# Patient Record
Sex: FEMALE | Race: WHITE | NOT HISPANIC OR LATINO
[De-identification: names, ages, dates, MRNs, and addresses within clinical notes are randomized per-mention and may not be internally consistent; named-entity substitution may affect disease eponyms.]

---

## 2017-07-30 ENCOUNTER — RESULT REVIEW (OUTPATIENT)
Age: 37
End: 2017-07-30

## 2018-01-31 ENCOUNTER — TRANSCRIPTION ENCOUNTER (OUTPATIENT)
Age: 38
End: 2018-01-31

## 2018-08-02 ENCOUNTER — RESULT REVIEW (OUTPATIENT)
Age: 38
End: 2018-08-02

## 2019-03-15 ENCOUNTER — TRANSCRIPTION ENCOUNTER (OUTPATIENT)
Age: 39
End: 2019-03-15

## 2019-07-31 ENCOUNTER — APPOINTMENT (OUTPATIENT)
Dept: ORTHOPEDIC SURGERY | Facility: CLINIC | Age: 39
End: 2019-07-31
Payer: COMMERCIAL

## 2019-07-31 VITALS — SYSTOLIC BLOOD PRESSURE: 152 MMHG | DIASTOLIC BLOOD PRESSURE: 95 MMHG | HEART RATE: 68 BPM

## 2019-07-31 VITALS — BODY MASS INDEX: 22.08 KG/M2 | WEIGHT: 120 LBS | HEIGHT: 62 IN

## 2019-07-31 DIAGNOSIS — Z78.9 OTHER SPECIFIED HEALTH STATUS: ICD-10-CM

## 2019-07-31 DIAGNOSIS — M25.521 PAIN IN RIGHT ELBOW: ICD-10-CM

## 2019-07-31 PROCEDURE — 73080 X-RAY EXAM OF ELBOW: CPT | Mod: RT

## 2019-07-31 PROCEDURE — 99204 OFFICE O/P NEW MOD 45 MIN: CPT

## 2019-07-31 NOTE — DISCUSSION/SUMMARY
[de-identified] : Lateral epicondylitis\par Rec tennis elbow brace\par Cryotherapy, Aleve, and rest were discussed\par She will followup with Dr. Aleman, in 3 -4 weeks if no better\par Options discussed.\par All questions answered.\par The patient is in full agreement with the plan, \par and happy with the visit.\par --------------\par This note was done with a voice recognition transcription \par software and any typos are related to this rather than medical error.\par \par

## 2019-07-31 NOTE — HISTORY OF PRESENT ILLNESS
[de-identified] : Patient is a pleasant, 39-year-old, right-hand-dominant female with a history of thyroid cancer comes in today complaining of right elbow pain for 2 months. Patient states that she is in karate for the past 5 years and is training for a tough mudder race.  Denies any specific injury or radicular symptoms.\par Patient admits to rubbing peppermint t oil on her elbow, which has helped somewhat.\par Patient points to her lateral aspect of her right elbow is #1 source of pain today.\par No fever chills sweats nausea vomiting no bowel or bladder dysfunction\par no recent weight loss or gain no night pain. \par This history is in addition to the intake form that I personally reviewed.

## 2019-08-02 PROBLEM — M25.521 RIGHT ELBOW PAIN: Status: ACTIVE | Noted: 2019-08-02

## 2019-08-13 ENCOUNTER — RESULT REVIEW (OUTPATIENT)
Age: 39
End: 2019-08-13

## 2019-08-22 ENCOUNTER — APPOINTMENT (OUTPATIENT)
Dept: ORTHOPEDIC SURGERY | Facility: CLINIC | Age: 39
End: 2019-08-22

## 2020-04-02 ENCOUNTER — RESULT REVIEW (OUTPATIENT)
Age: 40
End: 2020-04-02

## 2020-04-13 ENCOUNTER — OUTPATIENT (OUTPATIENT)
Dept: OUTPATIENT SERVICES | Facility: HOSPITAL | Age: 40
LOS: 1 days | End: 2020-04-13
Payer: COMMERCIAL

## 2020-04-13 ENCOUNTER — RESULT REVIEW (OUTPATIENT)
Age: 40
End: 2020-04-13

## 2020-04-13 ENCOUNTER — APPOINTMENT (OUTPATIENT)
Dept: ULTRASOUND IMAGING | Facility: CLINIC | Age: 40
End: 2020-04-13
Payer: COMMERCIAL

## 2020-04-13 DIAGNOSIS — Z00.8 ENCOUNTER FOR OTHER GENERAL EXAMINATION: ICD-10-CM

## 2020-04-13 PROCEDURE — 76830 TRANSVAGINAL US NON-OB: CPT

## 2020-04-13 PROCEDURE — 76830 TRANSVAGINAL US NON-OB: CPT | Mod: 26

## 2020-04-13 PROCEDURE — 76856 US EXAM PELVIC COMPLETE: CPT | Mod: 26

## 2020-04-13 PROCEDURE — 76856 US EXAM PELVIC COMPLETE: CPT

## 2020-05-04 ENCOUNTER — APPOINTMENT (OUTPATIENT)
Dept: ULTRASOUND IMAGING | Facility: CLINIC | Age: 40
End: 2020-05-04
Payer: COMMERCIAL

## 2020-05-04 ENCOUNTER — RESULT REVIEW (OUTPATIENT)
Age: 40
End: 2020-05-04

## 2020-05-04 ENCOUNTER — OUTPATIENT (OUTPATIENT)
Dept: OUTPATIENT SERVICES | Facility: HOSPITAL | Age: 40
LOS: 1 days | End: 2020-05-04
Payer: COMMERCIAL

## 2020-05-04 DIAGNOSIS — Z00.8 ENCOUNTER FOR OTHER GENERAL EXAMINATION: ICD-10-CM

## 2020-05-04 PROCEDURE — 58340 CATHETER FOR HYSTEROGRAPHY: CPT

## 2020-05-04 PROCEDURE — 76831 ECHO EXAM UTERUS: CPT | Mod: 26

## 2020-05-04 PROCEDURE — 76831 ECHO EXAM UTERUS: CPT

## 2020-06-22 ENCOUNTER — OUTPATIENT (OUTPATIENT)
Dept: OUTPATIENT SERVICES | Facility: HOSPITAL | Age: 40
LOS: 1 days | End: 2020-06-22
Payer: COMMERCIAL

## 2020-06-22 DIAGNOSIS — Z11.59 ENCOUNTER FOR SCREENING FOR OTHER VIRAL DISEASES: ICD-10-CM

## 2020-06-22 PROCEDURE — U0003: CPT

## 2020-06-23 LAB — SARS-COV-2 RNA SPEC QL NAA+PROBE: SIGNIFICANT CHANGE UP

## 2020-06-24 ENCOUNTER — TRANSCRIPTION ENCOUNTER (OUTPATIENT)
Age: 40
End: 2020-06-24

## 2020-06-25 ENCOUNTER — OUTPATIENT (OUTPATIENT)
Dept: OUTPATIENT SERVICES | Facility: HOSPITAL | Age: 40
LOS: 1 days | End: 2020-06-25
Payer: COMMERCIAL

## 2020-06-25 ENCOUNTER — RESULT REVIEW (OUTPATIENT)
Age: 40
End: 2020-06-25

## 2020-06-25 VITALS
WEIGHT: 128.09 LBS | OXYGEN SATURATION: 100 % | SYSTOLIC BLOOD PRESSURE: 111 MMHG | DIASTOLIC BLOOD PRESSURE: 76 MMHG | RESPIRATION RATE: 20 BRPM | HEIGHT: 63 IN | TEMPERATURE: 98 F | HEART RATE: 76 BPM

## 2020-06-25 VITALS
DIASTOLIC BLOOD PRESSURE: 80 MMHG | HEART RATE: 88 BPM | SYSTOLIC BLOOD PRESSURE: 127 MMHG | RESPIRATION RATE: 17 BRPM | TEMPERATURE: 97 F | OXYGEN SATURATION: 98 %

## 2020-06-25 DIAGNOSIS — Z01.818 ENCOUNTER FOR OTHER PREPROCEDURAL EXAMINATION: ICD-10-CM

## 2020-06-25 DIAGNOSIS — Z98.890 OTHER SPECIFIED POSTPROCEDURAL STATES: Chronic | ICD-10-CM

## 2020-06-25 DIAGNOSIS — D25.0 SUBMUCOUS LEIOMYOMA OF UTERUS: ICD-10-CM

## 2020-06-25 PROCEDURE — 58561 HYSTEROSCOPY REMOVE MYOMA: CPT

## 2020-06-25 PROCEDURE — 88305 TISSUE EXAM BY PATHOLOGIST: CPT | Mod: 26

## 2020-06-25 PROCEDURE — 88305 TISSUE EXAM BY PATHOLOGIST: CPT

## 2020-06-25 RX ORDER — ACETAMINOPHEN 500 MG
2 TABLET ORAL
Qty: 0 | Refills: 0 | DISCHARGE

## 2020-06-25 RX ORDER — CALCITRIOL 0.5 UG/1
0.5 CAPSULE ORAL DAILY
Refills: 0 | Status: DISCONTINUED | OUTPATIENT
Start: 2020-06-25 | End: 2020-07-10

## 2020-06-25 RX ORDER — LIDOCAINE HCL 20 MG/ML
0.2 VIAL (ML) INJECTION ONCE
Refills: 0 | Status: DISCONTINUED | OUTPATIENT
Start: 2020-06-25 | End: 2020-07-10

## 2020-06-25 RX ORDER — IBUPROFEN 200 MG
600 TABLET ORAL EVERY 6 HOURS
Refills: 0 | Status: DISCONTINUED | OUTPATIENT
Start: 2020-06-25 | End: 2020-07-10

## 2020-06-25 RX ORDER — ACETAMINOPHEN 500 MG
1 TABLET ORAL
Qty: 0 | Refills: 0 | DISCHARGE
Start: 2020-06-25

## 2020-06-25 RX ORDER — ONDANSETRON 8 MG/1
4 TABLET, FILM COATED ORAL ONCE
Refills: 0 | Status: DISCONTINUED | OUTPATIENT
Start: 2020-06-25 | End: 2020-07-10

## 2020-06-25 RX ORDER — FENTANYL CITRATE 50 UG/ML
25 INJECTION INTRAVENOUS
Refills: 0 | Status: DISCONTINUED | OUTPATIENT
Start: 2020-06-25 | End: 2020-06-25

## 2020-06-25 RX ORDER — SODIUM CHLORIDE 9 MG/ML
3 INJECTION INTRAMUSCULAR; INTRAVENOUS; SUBCUTANEOUS EVERY 8 HOURS
Refills: 0 | Status: DISCONTINUED | OUTPATIENT
Start: 2020-06-25 | End: 2020-07-10

## 2020-06-25 RX ORDER — NORETHINDRONE AND ETHINYL ESTRADIOL 0.4-0.035
1 KIT ORAL
Qty: 0 | Refills: 0 | DISCHARGE

## 2020-06-25 RX ORDER — SODIUM CHLORIDE 9 MG/ML
1000 INJECTION, SOLUTION INTRAVENOUS
Refills: 0 | Status: DISCONTINUED | OUTPATIENT
Start: 2020-06-25 | End: 2020-07-10

## 2020-06-25 RX ORDER — LEVOTHYROXINE SODIUM 125 MCG
175 TABLET ORAL DAILY
Refills: 0 | Status: DISCONTINUED | OUTPATIENT
Start: 2020-06-25 | End: 2020-07-10

## 2020-06-25 RX ORDER — IBUPROFEN 200 MG
1 TABLET ORAL
Qty: 0 | Refills: 0 | DISCHARGE
Start: 2020-06-25

## 2020-06-25 RX ORDER — ACETAMINOPHEN 500 MG
500 TABLET ORAL EVERY 6 HOURS
Refills: 0 | Status: DISCONTINUED | OUTPATIENT
Start: 2020-06-25 | End: 2020-07-10

## 2020-06-25 RX ORDER — IBUPROFEN 200 MG
1 TABLET ORAL
Qty: 0 | Refills: 0 | DISCHARGE

## 2020-06-25 NOTE — ASU PATIENT PROFILE, ADULT - PMH
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  Thyroid cancer  2010

## 2020-06-25 NOTE — BRIEF OPERATIVE NOTE - NSICDXBRIEFPROCEDURE_GEN_ALL_CORE_FT
PROCEDURES:  Dilation and curettage, uterus, with operative hysteroscopy and submucous leiomyoma excision 25-Jun-2020 09:32:03  Seferino Cheney

## 2020-06-25 NOTE — ASU DISCHARGE PLAN (ADULT/PEDIATRIC) - CALL YOUR DOCTOR IF YOU HAVE ANY OF THE FOLLOWING:
Wound/Surgical Site with redness, or foul smelling discharge or pus/Pain not relieved by Medications/Bleeding that does not stop/Swelling that gets worse/Fever greater than (need to indicate Fahrenheit or Celsius)

## 2020-06-25 NOTE — ASU PATIENT PROFILE, ADULT - IS PATIENT PREGNANT?
Post-Op Assessment Note      CV Status:  Stable    Mental Status:  Alert    Hydration Status:  Stable    PONV Controlled:  None    Airway Patency:  Patent  Airway: intubated    Post Op Vitals Reviewed: Yes          Staff: Anesthesiologist           BP   164/97   Temp   98F   Pulse  72   Resp      SpO2   99 no

## 2020-06-25 NOTE — ASU DISCHARGE PLAN (ADULT/PEDIATRIC) - PROCEDURE
D&C hysteroscopy, polypectomy D&C operative hysteroscopy, myomectomy using Myosure device D&C operative hysteroscopy, myomectomy using myosure device

## 2020-06-25 NOTE — BRIEF OPERATIVE NOTE - OPERATION/FINDINGS
anteverted uterus; benign appearing endometrium, ostia wnl; 4cm fundal submucosal fibroid removed using Myosure. Sharp curettage performed. Fluid defecit 100cc. Hemostasis achieved. Count correct, patient cleaned and stable.

## 2020-06-25 NOTE — ASU DISCHARGE PLAN (ADULT/PEDIATRIC) - CARE PROVIDER_API CALL
Iggy Dawn)  Obstetrics and Gynecology  [address] [place] [address]  Phone: [phone]  Fax: [phone]  Follow Up Time:

## 2020-06-25 NOTE — PRE-ANESTHESIA EVALUATION ADULT - NSANTHOSAYNRD_GEN_A_CORE
No. BENI screening performed.  STOP BANG Legend: 0-2 = LOW Risk; 3-4 = INTERMEDIATE Risk; 5-8 = HIGH Risk

## 2020-06-25 NOTE — ASU DISCHARGE PLAN (ADULT/PEDIATRIC) - ASU DC SPECIAL INSTRUCTIONSFT
Expect abdominal cramping/pain and spotting for the next weeks. Take ibuprofen and Tylenol for cramping. Use a pad as needed. Call your physician or go to the emergency room if you experience any of the following: heavy vaginal bleeding (soaking more than 2 pads in 1 hour for 2 hours), fever, chills, nausea, vomiting, or pain that is not controlled by medication. Follow-up with Dr. Dawn in 6 weeks.

## 2020-07-01 LAB — SURGICAL PATHOLOGY STUDY: SIGNIFICANT CHANGE UP

## 2020-09-15 ENCOUNTER — RESULT REVIEW (OUTPATIENT)
Age: 40
End: 2020-09-15

## 2021-01-12 ENCOUNTER — TRANSCRIPTION ENCOUNTER (OUTPATIENT)
Age: 41
End: 2021-01-12

## 2021-02-10 NOTE — PRE-ANESTHESIA EVALUATION ADULT - NSANTHAPLANRD_GEN_ALL_CORE
Chief Complaint   Patient presents with   • Office Visit   • Procedure     toe nail removal     HPI:   Right great toe  - Started 2 months ago   - Painful, draining pus  - Has tried rinsing in shower. Has not tried antibiotics or creams.   - No prior hx of ingrown nails     Physical Examination:   Visit Vitals  BP (!) 126/79 (BP Location: LUE - Left upper extremity, Patient Position: Sitting, Cuff Size: Small Adult)   Pulse 103   Resp 20   Gen: well appearing, no apparent distress   Ext: R first toenail laterally inflamed with blood and pus draining  Nail Removal    Date/Time: 2/10/2021 4:37 PM  Performed by: Val Erickson DO  Authorized by: Stanislaw Tompkins MD   Location: right foot  Location details: right big toe  Anesthesia: digital block    Anesthesia:  Local Anesthetic: lidocaine 1% without epinephrine  Anesthetic total: 10 mL    Sedation:  Patient sedated: no    Preparation: skin prepped with alcohol  Amount removed: 1/2  Side: lateral  Wedge excision of skin of nail fold: no  Nail bed sutured: no  Nail matrix removed: partial  Dressing: 4x4 and gauze roll  Patient tolerance: patient tolerated the procedure well with no immediate complications  Comments: Hemostasis achieved with pressure and silver nitrate. Patient tolerated procedure well.       A/P:   Ingrown toenail s/p removal   - Discussed signs of infection  - Patient tolerated procedure well   - Due to pus on presentation, will have return to clinic early next week for re-evaluation     Val Erickson DO  PGY1 Family Medicine Resident        
I saw and evaluated the patient. I discussed the patient's case with the resident. I agree with the Resident's findings and plan, as documented in today's note.      Risks, benefits and alternatives to procedure discussed with patient. Consent was obtained. I was present for procedure.     Galileo Tompkins MD  Family Medicine Faculty      
general

## 2021-03-04 ENCOUNTER — APPOINTMENT (OUTPATIENT)
Dept: INTERNAL MEDICINE | Facility: CLINIC | Age: 41
End: 2021-03-04

## 2021-03-18 ENCOUNTER — APPOINTMENT (OUTPATIENT)
Dept: INTERNAL MEDICINE | Facility: CLINIC | Age: 41
End: 2021-03-18
Payer: COMMERCIAL

## 2021-03-18 VITALS
HEIGHT: 62 IN | DIASTOLIC BLOOD PRESSURE: 75 MMHG | BODY MASS INDEX: 23.55 KG/M2 | OXYGEN SATURATION: 98 % | HEART RATE: 88 BPM | WEIGHT: 128 LBS | TEMPERATURE: 98.7 F | SYSTOLIC BLOOD PRESSURE: 115 MMHG

## 2021-03-18 DIAGNOSIS — Z11.3 ENCOUNTER FOR SCREENING FOR INFECTIONS WITH A PREDOMINANTLY SEXUAL MODE OF TRANSMISSION: ICD-10-CM

## 2021-03-18 DIAGNOSIS — Z83.49 FAMILY HISTORY OF OTHER ENDOCRINE, NUTRITIONAL AND METABOLIC DISEASES: ICD-10-CM

## 2021-03-18 PROCEDURE — 36415 COLL VENOUS BLD VENIPUNCTURE: CPT

## 2021-03-18 PROCEDURE — 99072 ADDL SUPL MATRL&STAF TM PHE: CPT

## 2021-03-18 PROCEDURE — 99386 PREV VISIT NEW AGE 40-64: CPT | Mod: 25

## 2021-03-18 RX ORDER — CALCITRIOL 100 %
CRYSTALS MISCELLANEOUS
Refills: 0 | Status: DISCONTINUED | COMMUNITY
End: 2021-03-18

## 2021-03-18 RX ORDER — NORETHINDRONE ACETATE AND ETHINYL ESTRADIOL 1.5; 3 MG/1; UG/1
1.5-3 TABLET ORAL
Refills: 0 | Status: DISCONTINUED | COMMUNITY
End: 2021-03-18

## 2021-03-18 RX ORDER — CALCITRIOL 0.5 UG/1
0.5 CAPSULE, LIQUID FILLED ORAL
Refills: 0 | Status: ACTIVE | COMMUNITY

## 2021-03-19 LAB
25(OH)D3 SERPL-MCNC: 32.7 NG/ML
ALBUMIN SERPL ELPH-MCNC: 4.2 G/DL
ALP BLD-CCNC: 36 U/L
ALT SERPL-CCNC: 38 U/L
ANION GAP SERPL CALC-SCNC: 12 MMOL/L
AST SERPL-CCNC: 20 U/L
BASOPHILS # BLD AUTO: 0.07 K/UL
BASOPHILS NFR BLD AUTO: 0.9 %
BILIRUB SERPL-MCNC: 0.2 MG/DL
BUN SERPL-MCNC: 15 MG/DL
CALCIUM SERPL-MCNC: 8.3 MG/DL
CHLORIDE SERPL-SCNC: 103 MMOL/L
CHOLEST SERPL-MCNC: 171 MG/DL
CO2 SERPL-SCNC: 24 MMOL/L
CREAT SERPL-MCNC: 0.86 MG/DL
EOSINOPHIL # BLD AUTO: 0.11 K/UL
EOSINOPHIL NFR BLD AUTO: 1.4 %
ESTIMATED AVERAGE GLUCOSE: 111 MG/DL
GLUCOSE SERPL-MCNC: 92 MG/DL
HAV IGM SER QL: NONREACTIVE
HBA1C MFR BLD HPLC: 5.5 %
HBV CORE IGM SER QL: NONREACTIVE
HBV SURFACE AG SER QL: NONREACTIVE
HCT VFR BLD CALC: 39.2 %
HCV AB SER QL: NONREACTIVE
HCV S/CO RATIO: 0.58 S/CO
HDLC SERPL-MCNC: 54 MG/DL
HGB BLD-MCNC: 12.4 G/DL
HIV1+2 AB SPEC QL IA.RAPID: NONREACTIVE
IMM GRANULOCYTES NFR BLD AUTO: 0.3 %
LDLC SERPL CALC-MCNC: 101 MG/DL
LYMPHOCYTES # BLD AUTO: 2.63 K/UL
LYMPHOCYTES NFR BLD AUTO: 33.7 %
MAN DIFF?: NORMAL
MCHC RBC-ENTMCNC: 27 PG
MCHC RBC-ENTMCNC: 31.6 GM/DL
MCV RBC AUTO: 85.2 FL
MONOCYTES # BLD AUTO: 0.47 K/UL
MONOCYTES NFR BLD AUTO: 6 %
NEUTROPHILS # BLD AUTO: 4.51 K/UL
NEUTROPHILS NFR BLD AUTO: 57.7 %
NONHDLC SERPL-MCNC: 117 MG/DL
PLATELET # BLD AUTO: 387 K/UL
POTASSIUM SERPL-SCNC: 4.5 MMOL/L
PROT SERPL-MCNC: 7 G/DL
RBC # BLD: 4.6 M/UL
RBC # FLD: 14.2 %
SODIUM SERPL-SCNC: 139 MMOL/L
T PALLIDUM AB SER QL IA: NEGATIVE
TRIGL SERPL-MCNC: 78 MG/DL
TSH SERPL-ACNC: 0.61 UIU/ML
WBC # FLD AUTO: 7.81 K/UL

## 2021-03-22 LAB
C TRACH RRNA SPEC QL NAA+PROBE: NOT DETECTED
N GONORRHOEA RRNA SPEC QL NAA+PROBE: NOT DETECTED
SOURCE AMPLIFICATION: NORMAL

## 2021-04-05 ENCOUNTER — RESULT REVIEW (OUTPATIENT)
Age: 41
End: 2021-04-05

## 2021-04-19 ENCOUNTER — RESULT REVIEW (OUTPATIENT)
Age: 41
End: 2021-04-19

## 2021-07-12 ENCOUNTER — APPOINTMENT (OUTPATIENT)
Dept: MAMMOGRAPHY | Facility: CLINIC | Age: 41
End: 2021-07-12
Payer: COMMERCIAL

## 2021-07-12 ENCOUNTER — APPOINTMENT (OUTPATIENT)
Dept: ULTRASOUND IMAGING | Facility: CLINIC | Age: 41
End: 2021-07-12
Payer: COMMERCIAL

## 2021-07-12 PROCEDURE — 77063 BREAST TOMOSYNTHESIS BI: CPT

## 2021-07-12 PROCEDURE — 76641 ULTRASOUND BREAST COMPLETE: CPT | Mod: 50

## 2021-07-12 PROCEDURE — 77067 SCR MAMMO BI INCL CAD: CPT

## 2021-07-26 PROBLEM — N63.0 BREAST MASS IN FEMALE: Status: ACTIVE | Noted: 2021-07-26

## 2021-08-02 ENCOUNTER — APPOINTMENT (OUTPATIENT)
Dept: SURGICAL ONCOLOGY | Facility: CLINIC | Age: 41
End: 2021-08-02
Payer: COMMERCIAL

## 2021-08-02 ENCOUNTER — RESULT REVIEW (OUTPATIENT)
Age: 41
End: 2021-08-02

## 2021-08-02 DIAGNOSIS — Z87.09 PERSONAL HISTORY OF OTHER DISEASES OF THE RESPIRATORY SYSTEM: ICD-10-CM

## 2021-08-02 DIAGNOSIS — Z80.3 FAMILY HISTORY OF MALIGNANT NEOPLASM OF BREAST: ICD-10-CM

## 2021-08-02 DIAGNOSIS — N63.0 UNSPECIFIED LUMP IN UNSPECIFIED BREAST: ICD-10-CM

## 2021-08-02 PROCEDURE — 99203 OFFICE O/P NEW LOW 30 MIN: CPT

## 2021-08-02 NOTE — HISTORY OF PRESENT ILLNESS
[de-identified] : Ms. DEREK BAKER is a 41 year old female who present today for initial consultation for right breast mass\par \par Past medical history: thyroid cancer at age 30, Asthma \par Family history: breast cancer in maternal grandmother at age 70. \par \par B/L mammo/sono 7/2021: Right breast 8:00 4cm from the nipple is a well-circumscribed benign-appearing hypoechoic nodule measuring 15 x 5 x 10mm. This finding appear increased in size possibly relating to difference in technique. Recommended for 6 \par months right breast US. BIRADS 3. \par \par  Today she is without any complaints. Denies palpable breast masses, nipple discharge, skin changes, inversion or breast pain. Denies constitutional symptoms.

## 2021-08-02 NOTE — ASSESSMENT
[FreeTextEntry1] : IMP: 41 year old female present for right breast mass \par \par PLAN: \par right breast US in 6 months. \par RTO 1 year

## 2021-08-02 NOTE — PHYSICAL EXAM
[Normal] : supple, no neck mass and thyroid not enlarged [Normal Neck Lymph Nodes] : normal neck lymph nodes  [Normal Supraclavicular Lymph Nodes] : normal supraclavicular lymph nodes [Normal Axillary Lymph Nodes] : normal axillary lymph nodes [Normal] : oriented to person, place and time, with appropriate affect [FreeTextEntry1] : GS was present during the exam  [de-identified] : Normal S1, S2. regular rate and rhythm.  [de-identified] : Complete breast exam performed in supine and upright positions. No palpable masses, tenderness, nipple discharge, inversion, deviation or enlarged axillary or supraclavicular lymph nodes bilaterally.  [de-identified] : Clear breath sounds bilaterally, normal respiratory effort.

## 2021-08-02 NOTE — CONSULT LETTER
[Dear  ___] : Dear  [unfilled], [Consult Letter:] : I had the pleasure of evaluating your patient, [unfilled]. [( Thank you for referring [unfilled] for consultation for _____ )] : Thank you for referring [unfilled] for consultation for [unfilled] [FreeTextEntry2] : Dr. Brumfield

## 2021-09-21 ENCOUNTER — NON-APPOINTMENT (OUTPATIENT)
Age: 41
End: 2021-09-21

## 2021-09-21 ENCOUNTER — TRANSCRIPTION ENCOUNTER (OUTPATIENT)
Age: 41
End: 2021-09-21

## 2021-10-04 ENCOUNTER — RESULT REVIEW (OUTPATIENT)
Age: 41
End: 2021-10-04

## 2021-10-13 ENCOUNTER — EMERGENCY (EMERGENCY)
Facility: HOSPITAL | Age: 41
LOS: 1 days | Discharge: HOME | End: 2021-10-13
Attending: EMERGENCY MEDICINE | Admitting: EMERGENCY MEDICINE
Payer: COMMERCIAL

## 2021-10-13 ENCOUNTER — NON-APPOINTMENT (OUTPATIENT)
Age: 41
End: 2021-10-13

## 2021-10-13 VITALS
DIASTOLIC BLOOD PRESSURE: 75 MMHG | HEART RATE: 81 BPM | RESPIRATION RATE: 16 BRPM | OXYGEN SATURATION: 99 % | SYSTOLIC BLOOD PRESSURE: 109 MMHG | TEMPERATURE: 99 F

## 2021-10-13 VITALS
WEIGHT: 119.93 LBS | HEIGHT: 63 IN | DIASTOLIC BLOOD PRESSURE: 82 MMHG | SYSTOLIC BLOOD PRESSURE: 127 MMHG | HEART RATE: 85 BPM | TEMPERATURE: 98 F | OXYGEN SATURATION: 100 % | RESPIRATION RATE: 15 BRPM

## 2021-10-13 DIAGNOSIS — Z98.890 OTHER SPECIFIED POSTPROCEDURAL STATES: Chronic | ICD-10-CM

## 2021-10-13 LAB
ALBUMIN SERPL ELPH-MCNC: 3.3 G/DL — SIGNIFICANT CHANGE UP (ref 3.3–5)
ALP SERPL-CCNC: 40 U/L — SIGNIFICANT CHANGE UP (ref 40–120)
ALT FLD-CCNC: 22 U/L — SIGNIFICANT CHANGE UP (ref 12–78)
ANION GAP SERPL CALC-SCNC: 6 MMOL/L — SIGNIFICANT CHANGE UP (ref 5–17)
APPEARANCE UR: ABNORMAL
AST SERPL-CCNC: 15 U/L — SIGNIFICANT CHANGE UP (ref 15–37)
BASOPHILS # BLD AUTO: 0.07 K/UL — SIGNIFICANT CHANGE UP (ref 0–0.2)
BASOPHILS NFR BLD AUTO: 0.5 % — SIGNIFICANT CHANGE UP (ref 0–2)
BILIRUB SERPL-MCNC: 0.3 MG/DL — SIGNIFICANT CHANGE UP (ref 0.2–1.2)
BILIRUB UR-MCNC: NEGATIVE — SIGNIFICANT CHANGE UP
BUN SERPL-MCNC: 20 MG/DL — SIGNIFICANT CHANGE UP (ref 7–23)
CALCIUM SERPL-MCNC: 7.9 MG/DL — LOW (ref 8.5–10.1)
CHLORIDE SERPL-SCNC: 107 MMOL/L — SIGNIFICANT CHANGE UP (ref 96–108)
CO2 SERPL-SCNC: 24 MMOL/L — SIGNIFICANT CHANGE UP (ref 22–31)
COLOR SPEC: YELLOW — SIGNIFICANT CHANGE UP
CREAT SERPL-MCNC: 0.83 MG/DL — SIGNIFICANT CHANGE UP (ref 0.5–1.3)
DIFF PNL FLD: ABNORMAL
EOSINOPHIL # BLD AUTO: 0.07 K/UL — SIGNIFICANT CHANGE UP (ref 0–0.5)
EOSINOPHIL NFR BLD AUTO: 0.5 % — SIGNIFICANT CHANGE UP (ref 0–6)
GLUCOSE SERPL-MCNC: 87 MG/DL — SIGNIFICANT CHANGE UP (ref 70–99)
GLUCOSE UR QL: NEGATIVE — SIGNIFICANT CHANGE UP
HCG SERPL-ACNC: <1 MIU/ML — SIGNIFICANT CHANGE UP
HCT VFR BLD CALC: 38.7 % — SIGNIFICANT CHANGE UP (ref 34.5–45)
HGB BLD-MCNC: 12.7 G/DL — SIGNIFICANT CHANGE UP (ref 11.5–15.5)
IMM GRANULOCYTES NFR BLD AUTO: 0.6 % — SIGNIFICANT CHANGE UP (ref 0–1.5)
KETONES UR-MCNC: NEGATIVE — SIGNIFICANT CHANGE UP
LEUKOCYTE ESTERASE UR-ACNC: ABNORMAL
LIDOCAIN IGE QN: 245 U/L — SIGNIFICANT CHANGE UP (ref 73–393)
LYMPHOCYTES # BLD AUTO: 16.7 % — SIGNIFICANT CHANGE UP (ref 13–44)
LYMPHOCYTES # BLD AUTO: 2.29 K/UL — SIGNIFICANT CHANGE UP (ref 1–3.3)
MCHC RBC-ENTMCNC: 27.7 PG — SIGNIFICANT CHANGE UP (ref 27–34)
MCHC RBC-ENTMCNC: 32.8 GM/DL — SIGNIFICANT CHANGE UP (ref 32–36)
MCV RBC AUTO: 84.3 FL — SIGNIFICANT CHANGE UP (ref 80–100)
MONOCYTES # BLD AUTO: 0.73 K/UL — SIGNIFICANT CHANGE UP (ref 0–0.9)
MONOCYTES NFR BLD AUTO: 5.3 % — SIGNIFICANT CHANGE UP (ref 2–14)
NEUTROPHILS # BLD AUTO: 10.44 K/UL — HIGH (ref 1.8–7.4)
NEUTROPHILS NFR BLD AUTO: 76.4 % — SIGNIFICANT CHANGE UP (ref 43–77)
NITRITE UR-MCNC: NEGATIVE — SIGNIFICANT CHANGE UP
NRBC # BLD: 0 /100 WBCS — SIGNIFICANT CHANGE UP (ref 0–0)
PH UR: 5 — SIGNIFICANT CHANGE UP (ref 5–8)
PLATELET # BLD AUTO: 315 K/UL — SIGNIFICANT CHANGE UP (ref 150–400)
POTASSIUM SERPL-MCNC: 3.7 MMOL/L — SIGNIFICANT CHANGE UP (ref 3.5–5.3)
POTASSIUM SERPL-SCNC: 3.7 MMOL/L — SIGNIFICANT CHANGE UP (ref 3.5–5.3)
PROT SERPL-MCNC: 7.2 G/DL — SIGNIFICANT CHANGE UP (ref 6–8.3)
PROT UR-MCNC: 15
RBC # BLD: 4.59 M/UL — SIGNIFICANT CHANGE UP (ref 3.8–5.2)
RBC # FLD: 13 % — SIGNIFICANT CHANGE UP (ref 10.3–14.5)
SODIUM SERPL-SCNC: 137 MMOL/L — SIGNIFICANT CHANGE UP (ref 135–145)
SP GR SPEC: 1.02 — SIGNIFICANT CHANGE UP (ref 1.01–1.02)
UROBILINOGEN FLD QL: NEGATIVE — SIGNIFICANT CHANGE UP
WBC # BLD: 13.68 K/UL — HIGH (ref 3.8–10.5)
WBC # FLD AUTO: 13.68 K/UL — HIGH (ref 3.8–10.5)

## 2021-10-13 PROCEDURE — 87086 URINE CULTURE/COLONY COUNT: CPT

## 2021-10-13 PROCEDURE — 99284 EMERGENCY DEPT VISIT MOD MDM: CPT | Mod: 25

## 2021-10-13 PROCEDURE — 74177 CT ABD & PELVIS W/CONTRAST: CPT | Mod: MA

## 2021-10-13 PROCEDURE — 36415 COLL VENOUS BLD VENIPUNCTURE: CPT

## 2021-10-13 PROCEDURE — 99285 EMERGENCY DEPT VISIT HI MDM: CPT

## 2021-10-13 PROCEDURE — 81001 URINALYSIS AUTO W/SCOPE: CPT

## 2021-10-13 PROCEDURE — 85025 COMPLETE CBC W/AUTO DIFF WBC: CPT

## 2021-10-13 PROCEDURE — 84702 CHORIONIC GONADOTROPIN TEST: CPT

## 2021-10-13 PROCEDURE — 83690 ASSAY OF LIPASE: CPT

## 2021-10-13 PROCEDURE — 80053 COMPREHEN METABOLIC PANEL: CPT

## 2021-10-13 PROCEDURE — 74177 CT ABD & PELVIS W/CONTRAST: CPT | Mod: 26,MA

## 2021-10-13 RX ORDER — CEFUROXIME AXETIL 250 MG
500 TABLET ORAL ONCE
Refills: 0 | Status: COMPLETED | OUTPATIENT
Start: 2021-10-13 | End: 2021-10-13

## 2021-10-13 RX ORDER — CEFUROXIME AXETIL 250 MG
1 TABLET ORAL
Qty: 14 | Refills: 0
Start: 2021-10-13 | End: 2021-10-19

## 2021-10-13 RX ADMIN — Medication 500 MILLIGRAM(S): at 04:12

## 2021-10-13 NOTE — ED PROVIDER NOTE - COVID-19 ORDERING FACILITY
[facility]  [facility] Patient answered NO to all of the above 3 questions.

## 2021-10-13 NOTE — ED PROVIDER NOTE - PHYSICAL EXAMINATION
Gen: Alert, NAD  Head/eyes: NC/AT, PERRL  ENT: airway patent  Neck: supple, no tenderness/meningismus/JVD, Trachea midline  Pulm/lung: Bilateral clear BS, normal resp effort, no wheeze/stridor/retractions  CV/heart: RRR, no M/R/G, +2 dist pulses (radial, pedal DP/PT, popliteal)  GI/Abd: soft, +ttp LLQ/ND, +BS, no guarding/rebound tenderness  Musculoskeletal: no edema/erythema/cyanosis, FROM in all extremities, no C/T/L spine ttp  Skin: no rash, no vesicles, no petechaie, no ecchymosis, no swelling  Neuro: AAOx3, CN 2-12 intact, normal sensation, 5/5 motor strength in all extremities, normal gait, no dysmetria

## 2021-10-13 NOTE — ED PROVIDER NOTE - PROGRESS NOTE DETAILS
feels better, pain significantly subsided, labs and imaging results explained, will f/u with outpt GI, will treat with abx for UA finding although could be contaminant, pending urine cultures

## 2021-10-13 NOTE — ED PROVIDER NOTE - NS ED ROS FT
Constitutional: - Fever, - Chills, - Anorexia, - Fatigue, - Night sweats  Eyes: - Discharge, - Irritation, - Redness, - Visual changes, - Light sensitivity, - Pain  EARS: - Ear Pain, - Tinnitus, - Decreased hearing  NOSE: - Congestion, - Bloody nose  MOUTH/THROAT: - Vocal Changes, - Drooling, - Sore throat  NECK: - Lumps, - Stiffness, - Pain  CV: - Palpitations, - Chest Pain, - Edema, - Syncope  RESP:  - Cough, - Shortness of Breath, - Dyspnea on Exertion, - Trouble speaking, - Pleuritic pain - Wheezing  GI: + Diarrhea, - Constipation, - Bloody stools, + Nausea, - Vomiting, +Abdominal Pain  : - Dysuria, -Frequency, - Hematuria, - Hesitancy, - Incontinence, - Saddle Anesthesia, - Abnormal discharge  MSK: - Myalgias, - Arthralgias, - Weakness, - Deformities, - Injuries  SKIN: - Color change, - Rash, - Swelling, - Ecchymosis, - Abrasion, - laceration  NEURO: - Change in behavior, - Dec. Alertness, - Headache, - Dizziness, - Change in speech, - Weakness, - Seizure-like activity, - Difficulty ambulating

## 2021-10-13 NOTE — ED ADULT NURSE NOTE - NSICDXPASTMEDICALHX_GEN_ALL_CORE_FT
PAST MEDICAL HISTORY:  Cholelithiasis s/p lap brian 2002    Tear gland atrophy secondary to radiation, pt recieves artificial tear implant every 3 mos.    Thyroid cancer 2010

## 2021-10-13 NOTE — ED PROVIDER NOTE - NSFOLLOWUPINSTRUCTIONS_ED_ALL_ED_FT
1) Follow-up with your Primary Medical Doctor and Dr. Mcbride Call today / next business day for prompt follow-up.  2) Return to Emergency room for any worsening or persistent pain, weakness, fever, or any other concerning symptoms.  3) See attached instruction sheets for additional information, including information regarding signs and symptoms to look out for, reasons to seek immediate care and other important instructions.  4) Follow-up with any specialists as discussed / noted as well.  5) Ceftin 500mg twice a day for 7 days.      WHAT YOU NEED TO KNOW:    Abdominal pain can be dull, achy, or sharp. You may have pain in one area of your abdomen, or in your entire abdomen. Your pain may be caused by a condition such as constipation, food sensitivity or poisoning, infection, or a blockage. Abdominal pain can also be from a hernia, appendicitis, or an ulcer. Liver, gallbladder, or kidney conditions can also cause abdominal pain. The cause of your abdominal pain may not be known.    Abdominal Organs         DISCHARGE INSTRUCTIONS:    Call your local emergency number (911 in the ) if:   •You have new chest pain or shortness of breath.          Return to the emergency department if:   •You have pulsing pain in your upper abdomen or lower back that suddenly becomes constant.      •Your pain is in the right lower abdominal area and worsens with movement.      •You have a fever over 100.4°F (38°C) or shaking chills.      •You are vomiting and cannot keep food or liquids down.      •Your pain does not improve or gets worse over the next 8 to 12 hours.      •You see blood in your vomit or bowel movements, or they look black and tarry.      •Your skin or the whites of your eyes turn yellow.      •You are a woman and have a large amount of vaginal bleeding that is not your monthly period.      Call your doctor if:   •You have pain in your lower back.      •You are a man and have pain in your testicles.      •You have pain when you urinate.      •You have questions or concerns about your condition or care.      Medicines:   •Prescription pain medicine may be given. Ask your healthcare provider how to take this medicine safely. Some prescription pain medicines contain acetaminophen. Do not take other medicines that contain acetaminophen without talking to your healthcare provider. Too much acetaminophen may cause liver damage. Prescription pain medicine may cause constipation. Ask your healthcare provider how to prevent or treat constipation.       •Medicines may be given to calm your stomach or prevent vomiting.      •Take your medicine as directed. Contact your healthcare provider if you think your medicine is not helping or if you have side effects. Tell him of her if you are allergic to any medicine. Keep a list of the medicines, vitamins, and herbs you take. Include the amounts, and when and why you take them. Bring the list or the pill bottles to follow-up visits. Carry your medicine list with you in case of an emergency.      Manage your symptoms:   •Apply heat on your abdomen for 20 to 30 minutes every 2 hours for as many days as directed. Heat helps decrease pain and muscle spasms.      •Make changes to the food you eat, if needed. Do not eat foods that cause abdominal pain or other symptoms. Eat small meals more often. The following changes may also help:?Eat more high-fiber foods if you are constipated. High-fiber foods include fruits, vegetables, whole-grain foods, and legumes.             ?Do not eat foods that cause gas if you have bloating. Examples include broccoli, cabbage, and cauliflower. Do not drink soda or carbonated drinks. These may also cause gas.      ?Do not eat foods or drinks that contain sorbitol or fructose if you have diarrhea and bloating. Some examples are fruit juices, candy, jelly, and sugar-free gum.      ?Do not eat high-fat foods. Examples include fried foods, cheeseburgers, hot dogs, and desserts.      ?Limit or do not have caffeine. Caffeine may make symptoms such as heartburn or nausea worse.      ?Drink more liquids to prevent dehydration from diarrhea or vomiting. Ask your healthcare provider how much liquid to drink each day and which liquids are best for you.      •Keep a diary of your abdominal pain. A diary may help your healthcare provider learn what is causing your abdominal pain. Include when the pain happens, how long it lasts, and what the pain feels like. Write down any other symptoms you have with abdominal pain. Also write down what you eat, and what symptoms you have after you eat.      •Manage your stress. Stress may cause abdominal pain. Your healthcare provider may recommend relaxation techniques and deep breathing exercises to help decrease your stress. Your healthcare provider may recommend you talk to someone about your stress or anxiety, such as a counselor or a trusted friend. Get plenty of sleep and exercise regularly.  [facility] Walking for Exercise           •Limit or do not drink alcohol. Alcohol can make your abdominal pain worse. Ask your healthcare provider if it is safe for you to drink alcohol. Also ask how much is safe for you to drink.      •Do not smoke. Nicotine and other chemicals in cigarettes can damage your esophagus and stomach. Ask your healthcare provider for information if you currently smoke and need help to quit. E-cigarettes or smokeless tobacco still contain nicotine. Talk to your healthcare provider before you use these products.      Follow up with your doctor within 24 hours or as directed: Write down your questions so you remember to ask them during your visits.

## 2021-10-13 NOTE — ED ADULT NURSE NOTE - OBJECTIVE STATEMENT
Patient A/Ox4 with a steady gait.  at bedside. Patient c/o LLQ pain that is dull and intermittent. Also endorses nausea. Symptoms began tonight. IV/labs/urine done. Call light in reach.

## 2021-10-13 NOTE — ED PROVIDER NOTE - CLINICAL SUMMARY MEDICAL DECISION MAKING FREE TEXT BOX
r/o colitis/diverticulitis/appy, labs, CT a/p, pt declining analgesia/antiemetics at time of encounter

## 2021-10-13 NOTE — ED PROVIDER NOTE - PATIENT PORTAL LINK FT
You can access the FollowMyHealth Patient Portal offered by [facility] by registering at the following website: [email or web address]. By joining [facility]s FollowMyHealth portal, you will also be able to view your health information using other applications (apps) compatible with our system.

## 2021-10-13 NOTE — ED PROVIDER NOTE - OBJECTIVE STATEMENT
42 yo female hx of cholecystectomy c/o lower abdominal pain tonight, nonradiating, moderate to severe, but now subsiding, no medications taken for this.  +nausea, no vomiting.  +diarrhea.  LLQ more than RLQ.

## 2021-10-13 NOTE — ED PROVIDER NOTE - CARE PROVIDER_API CALL
Irving Mcbride ()  Internal Medicine  [address] [place] [address]  Phone: [phone]  Fax: [phone]  Follow Up Time:

## 2021-10-13 NOTE — ED ADULT NURSE NOTE - NSICDXPASTSURGICALHX_GEN_ALL_CORE_FT
PAST SURGICAL HISTORY:  H/O myomectomy 2014    S/P laparoscopic cholecystectomy 2002    S/P total thyroidectomy 2010

## 2021-10-14 LAB
CULTURE RESULTS: SIGNIFICANT CHANGE UP
SPECIMEN SOURCE: SIGNIFICANT CHANGE UP

## 2021-10-15 ENCOUNTER — APPOINTMENT (OUTPATIENT)
Dept: GASTROENTEROLOGY | Facility: CLINIC | Age: 41
End: 2021-10-15

## 2021-10-19 DIAGNOSIS — R10.9 UNSPECIFIED ABDOMINAL PAIN: ICD-10-CM

## 2021-12-27 ENCOUNTER — APPOINTMENT (OUTPATIENT)
Dept: MRI IMAGING | Facility: IMAGING CENTER | Age: 41
End: 2021-12-27
Payer: COMMERCIAL

## 2021-12-27 ENCOUNTER — OUTPATIENT (OUTPATIENT)
Dept: OUTPATIENT SERVICES | Facility: HOSPITAL | Age: 41
LOS: 1 days | End: 2021-12-27
Payer: COMMERCIAL

## 2021-12-27 ENCOUNTER — TRANSCRIPTION ENCOUNTER (OUTPATIENT)
Age: 41
End: 2021-12-27

## 2021-12-27 DIAGNOSIS — Z98.890 OTHER SPECIFIED POSTPROCEDURAL STATES: Chronic | ICD-10-CM

## 2021-12-27 DIAGNOSIS — Z00.8 ENCOUNTER FOR OTHER GENERAL EXAMINATION: ICD-10-CM

## 2021-12-27 PROCEDURE — A9585: CPT

## 2021-12-27 PROCEDURE — C8937: CPT

## 2021-12-27 PROCEDURE — C8908: CPT

## 2021-12-27 PROCEDURE — 77049 MRI BREAST C-+ W/CAD BI: CPT | Mod: 26

## 2022-01-04 ENCOUNTER — APPOINTMENT (OUTPATIENT)
Dept: ULTRASOUND IMAGING | Facility: CLINIC | Age: 42
End: 2022-01-04
Payer: COMMERCIAL

## 2022-01-04 ENCOUNTER — RESULT REVIEW (OUTPATIENT)
Age: 42
End: 2022-01-04

## 2022-01-04 PROCEDURE — 76642 ULTRASOUND BREAST LIMITED: CPT | Mod: RT

## 2022-01-18 ENCOUNTER — RESULT REVIEW (OUTPATIENT)
Age: 42
End: 2022-01-18

## 2022-01-18 ENCOUNTER — APPOINTMENT (OUTPATIENT)
Dept: ULTRASOUND IMAGING | Facility: CLINIC | Age: 42
End: 2022-01-18
Payer: COMMERCIAL

## 2022-01-18 ENCOUNTER — APPOINTMENT (OUTPATIENT)
Dept: MRI IMAGING | Facility: CLINIC | Age: 42
End: 2022-01-18
Payer: COMMERCIAL

## 2022-01-18 ENCOUNTER — OUTPATIENT (OUTPATIENT)
Dept: OUTPATIENT SERVICES | Facility: HOSPITAL | Age: 42
LOS: 1 days | End: 2022-01-18
Payer: COMMERCIAL

## 2022-01-18 DIAGNOSIS — N63.0 UNSPECIFIED LUMP IN UNSPECIFIED BREAST: ICD-10-CM

## 2022-01-18 DIAGNOSIS — Z98.890 OTHER SPECIFIED POSTPROCEDURAL STATES: Chronic | ICD-10-CM

## 2022-01-18 DIAGNOSIS — R92.8 OTHER ABNORMAL AND INCONCLUSIVE FINDINGS ON DIAGNOSTIC IMAGING OF BREAST: ICD-10-CM

## 2022-01-18 PROCEDURE — 77065 DX MAMMO INCL CAD UNI: CPT | Mod: 26,RT

## 2022-01-18 PROCEDURE — 19085 BX BREAST 1ST LESION MR IMAG: CPT | Mod: RT

## 2022-01-18 PROCEDURE — 19083 BX BREAST 1ST LESION US IMAG: CPT | Mod: RT

## 2022-01-18 PROCEDURE — 88305 TISSUE EXAM BY PATHOLOGIST: CPT | Mod: 26

## 2022-01-18 PROCEDURE — 19085 BX BREAST 1ST LESION MR IMAG: CPT

## 2022-01-18 PROCEDURE — 19083 BX BREAST 1ST LESION US IMAG: CPT

## 2022-01-18 PROCEDURE — 88305 TISSUE EXAM BY PATHOLOGIST: CPT

## 2022-01-18 PROCEDURE — 77065 DX MAMMO INCL CAD UNI: CPT

## 2022-01-18 PROCEDURE — A9585: CPT

## 2022-01-18 PROCEDURE — A4648: CPT

## 2022-01-20 LAB — SURGICAL PATHOLOGY STUDY: SIGNIFICANT CHANGE UP

## 2022-04-18 ENCOUNTER — APPOINTMENT (OUTPATIENT)
Dept: GASTROENTEROLOGY | Facility: CLINIC | Age: 42
End: 2022-04-18
Payer: COMMERCIAL

## 2022-04-18 VITALS
BODY MASS INDEX: 21.9 KG/M2 | HEIGHT: 62 IN | OXYGEN SATURATION: 96 % | HEART RATE: 83 BPM | TEMPERATURE: 98.6 F | WEIGHT: 119 LBS | DIASTOLIC BLOOD PRESSURE: 70 MMHG | SYSTOLIC BLOOD PRESSURE: 102 MMHG

## 2022-04-18 DIAGNOSIS — Z85.850 PERSONAL HISTORY OF MALIGNANT NEOPLASM OF THYROID: ICD-10-CM

## 2022-04-18 PROCEDURE — 36415 COLL VENOUS BLD VENIPUNCTURE: CPT

## 2022-04-18 PROCEDURE — 99204 OFFICE O/P NEW MOD 45 MIN: CPT | Mod: 25

## 2022-04-18 NOTE — ASSESSMENT
[FreeTextEntry1] : Impression: Probable IBS-D.  Most likely lactose or other FODMAP intolerant.  Rule out celiac disease, rule out IBD.\par \par Plan: Labs including celiac markers and CRP.  Lactose-free/low FODMAP diet.  Dicyclomine 20 mg 3 times daily as needed.  If symptoms persist and pending lab results may require a colonoscopy, otherwise will begin colorectal cancer screening next year

## 2022-04-18 NOTE — HISTORY OF PRESENT ILLNESS
[Heartburn] : denies heartburn [Nausea] : denies nausea [Vomiting] : denies vomiting [Diarrhea] : diarrhea worsened [Constipation] : stable constipation [Yellow Skin Or Eyes (Jaundice)] : denies jaundice [Abdominal Pain] : denies abdominal pain [Abdominal Swelling] : denies abdominal swelling [Rectal Pain] : denies rectal pain [GERD] : no gastroesophageal reflux disease [Hiatus Hernia] : no hiatus hernia [Peptic Ulcer Disease] : no peptic ulcer disease [Pancreatitis] : no pancreatitis [Cholelithiasis] : cholelithiasis [Kidney Stone] : no kidney stone [Inflammatory Bowel Disease] : no inflammatory bowel disease [Irritable Bowel Syndrome] : irritable bowel syndrome [Diverticulitis] : no diverticulitis [Alcohol Abuse] : no alcohol abuse [Malignancy] : malignancy [Abdominal Surgery] : abdominal surgery [Cholecystectomy] : cholecystectomy [de-identified] : 42-year-old female history of thyroid cancer status post ORTIZ and thyroidectomy on Synthroid with normal thyroid hormone levels has a long history of IBS symptoms with worse symptoms over the past 2 years.  In the past she had alternating diarrhea and constipation but more recently has become predominantly diarrhea.  Bowel movements are often urgent and loose with multiple bowel movements per day.  No definite triggers except for coffee.  Eats healthy.  No blood or mucus in stool no weight loss.  No nausea vomiting.  No abdominal pain.  No colorectal cancer risk factors.  Has never had a colonoscopy.

## 2022-04-18 NOTE — PHYSICAL EXAM
[General Appearance - Alert] : alert [General Appearance - In No Acute Distress] : in no acute distress [FreeTextEntry1] : Thyroidectomy scar [Auscultation Breath Sounds / Voice Sounds] : lungs were clear to auscultation bilaterally [Heart Rate And Rhythm] : heart rate was normal and rhythm regular [Heart Sounds] : normal S1 and S2 [Heart Sounds Gallop] : no gallops [Murmurs] : no murmurs [Heart Sounds Pericardial Friction Rub] : no pericardial rub [Full Pulse] : the pedal pulses are present [Edema] : there was no peripheral edema [Bowel Sounds] : normal bowel sounds [Abdomen Soft] : soft [Abdomen Tenderness] : non-tender [] : no hepato-splenomegaly [Abdomen Mass (___ Cm)] : no abdominal mass palpated

## 2022-04-19 LAB
ALBUMIN SERPL ELPH-MCNC: 4.7 G/DL
ALP BLD-CCNC: 49 U/L
ALT SERPL-CCNC: 23 U/L
ANION GAP SERPL CALC-SCNC: 14 MMOL/L
AST SERPL-CCNC: 14 U/L
BASOPHILS # BLD AUTO: 0.05 K/UL
BASOPHILS NFR BLD AUTO: 0.8 %
BILIRUB SERPL-MCNC: 0.3 MG/DL
BUN SERPL-MCNC: 10 MG/DL
CALCIUM SERPL-MCNC: 8.8 MG/DL
CHLORIDE SERPL-SCNC: 102 MMOL/L
CO2 SERPL-SCNC: 23 MMOL/L
CREAT SERPL-MCNC: 0.87 MG/DL
CRP SERPL-MCNC: 4 MG/L
EGFR: 84 ML/MIN/1.73M2
EOSINOPHIL # BLD AUTO: 0.03 K/UL
EOSINOPHIL NFR BLD AUTO: 0.5 %
GLUCOSE SERPL-MCNC: 108 MG/DL
HCT VFR BLD CALC: 43.3 %
HGB BLD-MCNC: 14 G/DL
IMM GRANULOCYTES NFR BLD AUTO: 0.3 %
LYMPHOCYTES # BLD AUTO: 2.22 K/UL
LYMPHOCYTES NFR BLD AUTO: 34.4 %
MAN DIFF?: NORMAL
MCHC RBC-ENTMCNC: 27.8 PG
MCHC RBC-ENTMCNC: 32.3 GM/DL
MCV RBC AUTO: 85.9 FL
MONOCYTES # BLD AUTO: 0.38 K/UL
MONOCYTES NFR BLD AUTO: 5.9 %
NEUTROPHILS # BLD AUTO: 3.75 K/UL
NEUTROPHILS NFR BLD AUTO: 58.1 %
PLATELET # BLD AUTO: 321 K/UL
POTASSIUM SERPL-SCNC: 4.4 MMOL/L
PROT SERPL-MCNC: 7.5 G/DL
RBC # BLD: 5.04 M/UL
RBC # FLD: 12.6 %
SODIUM SERPL-SCNC: 139 MMOL/L
WBC # FLD AUTO: 6.45 K/UL

## 2022-04-20 LAB
GLIADIN IGA SER QL: <5 UNITS
GLIADIN IGG SER QL: <5 UNITS
GLIADIN PEPTIDE IGA SER-ACNC: NEGATIVE
GLIADIN PEPTIDE IGG SER-ACNC: NEGATIVE
TTG IGA SER IA-ACNC: <1.2 U/ML
TTG IGA SER-ACNC: NEGATIVE
TTG IGG SER IA-ACNC: 5.2 U/ML
TTG IGG SER IA-ACNC: NEGATIVE

## 2022-05-24 RX ORDER — DICYCLOMINE HYDROCHLORIDE 20 MG/1
20 TABLET ORAL
Qty: 90 | Refills: 3 | Status: DISCONTINUED | COMMUNITY
Start: 2022-04-18 | End: 2022-05-24

## 2022-06-09 ENCOUNTER — NON-APPOINTMENT (OUTPATIENT)
Age: 42
End: 2022-06-09

## 2022-06-10 ENCOUNTER — OUTPATIENT (OUTPATIENT)
Dept: OUTPATIENT SERVICES | Facility: HOSPITAL | Age: 42
LOS: 1 days | End: 2022-06-10

## 2022-06-10 ENCOUNTER — TRANSCRIPTION ENCOUNTER (OUTPATIENT)
Age: 42
End: 2022-06-10

## 2022-06-10 ENCOUNTER — APPOINTMENT (OUTPATIENT)
Dept: DISASTER EMERGENCY | Facility: HOSPITAL | Age: 42
End: 2022-06-10

## 2022-06-10 VITALS
RESPIRATION RATE: 18 BRPM | TEMPERATURE: 99 F | DIASTOLIC BLOOD PRESSURE: 78 MMHG | OXYGEN SATURATION: 98 % | SYSTOLIC BLOOD PRESSURE: 122 MMHG | HEART RATE: 88 BPM

## 2022-06-10 VITALS
RESPIRATION RATE: 18 BRPM | OXYGEN SATURATION: 98 % | WEIGHT: 119.93 LBS | SYSTOLIC BLOOD PRESSURE: 124 MMHG | HEART RATE: 91 BPM | HEIGHT: 60.2 IN | TEMPERATURE: 99 F | DIASTOLIC BLOOD PRESSURE: 85 MMHG

## 2022-06-10 DIAGNOSIS — U07.1 COVID-19: ICD-10-CM

## 2022-06-10 DIAGNOSIS — Z98.890 OTHER SPECIFIED POSTPROCEDURAL STATES: Chronic | ICD-10-CM

## 2022-06-10 RX ORDER — BEBTELOVIMAB 87.5 MG/ML
175 INJECTION, SOLUTION INTRAVENOUS ONCE
Refills: 0 | Status: COMPLETED | OUTPATIENT
Start: 2022-06-10 | End: 2022-06-10

## 2022-06-10 RX ADMIN — BEBTELOVIMAB 175 MILLIGRAM(S): 87.5 INJECTION, SOLUTION INTRAVENOUS at 16:20

## 2022-06-10 NOTE — MONOCLONAL ANTIBODY INFUSION - ASSESSMENT AND PLAN
ASSESSMENT:  Pt is a -42 years old female with -Covid + on 6/10  referred by Dr. Nice .who presents to infusion center for Monoclonal antibody infusion (Bebtelovimab). Patient is vaccinated and boosted with Pfizer  Symptoms/ Criteria: cough, chest pain, sneezing, headache, running nose, sore throat  Risk Profile includes: thyroid cancer    PLAN:  - infusion procedure explained to patient   - Consent for monoclonal antibody infusion obtained   - Risk & benefits discussed/all questions answered  - Bebtelovimab 175mg IVP over 30 seconds  - observe patient for one hour post infusion and discharge home

## 2022-06-10 NOTE — MONOCLONAL ANTIBODY INFUSION - HOME MEDICATIONS
cefuroxime 500 mg oral tablet , 1 tab(s) orally 2 times a day   Junel Fe 24 oral tablet , 1 tab(s) orally once a day  calcitriol 0.5 mcg oral capsule , 1 cap(s) orally once a day  levothyroxine 175 mcg (0.175 mg) oral tablet , 1 tab(s) orally once a day

## 2022-07-08 ENCOUNTER — APPOINTMENT (OUTPATIENT)
Dept: INTERNAL MEDICINE | Facility: CLINIC | Age: 42
End: 2022-07-08

## 2022-07-08 PROCEDURE — 99202 OFFICE O/P NEW SF 15 MIN: CPT | Mod: 95

## 2022-07-08 RX ORDER — NORETHINDRONE ACETATE AND ETHINYL ESTRADIOL AND FERROUS FUMARATE 1.5-30(21)
1.5-3 KIT ORAL
Qty: 28 | Refills: 0 | Status: ACTIVE | COMMUNITY
Start: 2022-03-31

## 2022-07-12 ENCOUNTER — APPOINTMENT (OUTPATIENT)
Dept: MAMMOGRAPHY | Facility: CLINIC | Age: 42
End: 2022-07-12

## 2022-07-12 ENCOUNTER — APPOINTMENT (OUTPATIENT)
Dept: ULTRASOUND IMAGING | Facility: CLINIC | Age: 42
End: 2022-07-12

## 2022-07-12 PROCEDURE — 77063 BREAST TOMOSYNTHESIS BI: CPT

## 2022-07-12 PROCEDURE — 77067 SCR MAMMO BI INCL CAD: CPT

## 2022-07-12 PROCEDURE — 76641 ULTRASOUND BREAST COMPLETE: CPT | Mod: 50

## 2022-07-18 ENCOUNTER — APPOINTMENT (OUTPATIENT)
Dept: ULTRASOUND IMAGING | Facility: CLINIC | Age: 42
End: 2022-07-18

## 2022-07-18 ENCOUNTER — APPOINTMENT (OUTPATIENT)
Dept: MAMMOGRAPHY | Facility: CLINIC | Age: 42
End: 2022-07-18

## 2022-07-18 PROCEDURE — G0279: CPT

## 2022-07-18 PROCEDURE — 77065 DX MAMMO INCL CAD UNI: CPT | Mod: RT

## 2022-07-18 PROCEDURE — 76642 ULTRASOUND BREAST LIMITED: CPT | Mod: LT

## 2022-07-28 ENCOUNTER — APPOINTMENT (OUTPATIENT)
Dept: RADIOLOGY | Facility: CLINIC | Age: 42
End: 2022-07-28

## 2022-07-28 ENCOUNTER — OUTPATIENT (OUTPATIENT)
Dept: OUTPATIENT SERVICES | Facility: HOSPITAL | Age: 42
LOS: 1 days | End: 2022-07-28
Payer: COMMERCIAL

## 2022-07-28 ENCOUNTER — NON-APPOINTMENT (OUTPATIENT)
Age: 42
End: 2022-07-28

## 2022-07-28 ENCOUNTER — APPOINTMENT (OUTPATIENT)
Dept: PULMONOLOGY | Facility: CLINIC | Age: 42
End: 2022-07-28

## 2022-07-28 VITALS
TEMPERATURE: 96.8 F | HEART RATE: 87 BPM | BODY MASS INDEX: 21.9 KG/M2 | SYSTOLIC BLOOD PRESSURE: 120 MMHG | RESPIRATION RATE: 16 BRPM | DIASTOLIC BLOOD PRESSURE: 80 MMHG | WEIGHT: 119 LBS | OXYGEN SATURATION: 98 % | HEIGHT: 62 IN

## 2022-07-28 DIAGNOSIS — Z00.8 ENCOUNTER FOR OTHER GENERAL EXAMINATION: ICD-10-CM

## 2022-07-28 DIAGNOSIS — J38.00 PARALYSIS OF VOCAL CORDS AND LARYNX, UNSPECIFIED: ICD-10-CM

## 2022-07-28 DIAGNOSIS — Z98.890 OTHER SPECIFIED POSTPROCEDURAL STATES: Chronic | ICD-10-CM

## 2022-07-28 DIAGNOSIS — Z86.39 PERSONAL HISTORY OF OTHER ENDOCRINE, NUTRITIONAL AND METABOLIC DISEASE: ICD-10-CM

## 2022-07-28 DIAGNOSIS — Z87.19 PERSONAL HISTORY OF OTHER DISEASES OF THE DIGESTIVE SYSTEM: ICD-10-CM

## 2022-07-28 PROCEDURE — 94010 BREATHING CAPACITY TEST: CPT

## 2022-07-28 PROCEDURE — 99204 OFFICE O/P NEW MOD 45 MIN: CPT | Mod: 25

## 2022-07-28 PROCEDURE — 71046 X-RAY EXAM CHEST 2 VIEWS: CPT | Mod: 26

## 2022-07-28 PROCEDURE — 95012 NITRIC OXIDE EXP GAS DETER: CPT

## 2022-07-28 PROCEDURE — 71046 X-RAY EXAM CHEST 2 VIEWS: CPT

## 2022-07-28 PROCEDURE — 94618 PULMONARY STRESS TESTING: CPT

## 2022-07-28 NOTE — REASON FOR VISIT
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

## 2022-07-28 NOTE — ASSESSMENT
[FreeTextEntry1] : Ms. BAKER is a 42 year old female with a history of fibroid surgery, IBS, hypothyroid, malabsorption, thyroid surgery with parathyroids injury (2010), covid-19 infection, benign breast tumor, post-covid sob,  presenting to the office today for initial pulmonary evaluation. 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 Asthma may be triggered by allergens, lung infections, or irritants in the air. Asthma triggers are different for each person \par Inhaler technique reviewed as well as oral hygiene techniques reviewed with patient. Avoidance of cold air, extremes of temperature, rescue inhaler should be used before exercise. Order of medication reviewed with patient. Recommended use of a cool mist humidifier in the bedroom. 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 \par \par Problem 5:  ?LPR \par -Add Pepcid 40mg QHS \par -Rule of 2s: avoid eating too much, eating too late, eating too spicy, eating two hours before bed.\par -Things to avoid including overeating, spicy foods, tight clothing, eating within three hours of bed, this list is not all inclusive. \par -For treatment of reflux, possible options discussed including diet control, H2 blockers, PPIs, as well as coating motility agents discussed as treatment options. Timing of meals and proximity of last meal to sleep were discussed. If symptoms persist, a formal gastrointestinal evaluation is needed. 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  Cardiovascular disease risk includes arrhythmias, atrial fibrillation, hypertension, coronary artery disease, and stroke. Metabolic disorders include diabetes type 2, non-alcoholic fatty liver disease. Mood disorder especially depression; and cognitive decline especially in the elderly. Associations with  chronic reflux/Farmer’s esophagus some but not all inclusive. \par -Reasons  include arousal consistent with hypopnea; respiratory events most prominent in REM sleep or supine position; therefore sleep staging and body position are important for accurate diagnosis and estimation of AHI. \par \par problem : cardiac disease\par -Complete CRP \par -Complete Sed rate \par -recommended to continue to follow up with Cardiologist \par \par problem : poor breathing mechanics\par -Proper breathing techniques were reviewed with an emphasis of exhalation. Patient instructed to breath in for 1 second and out for four seconds. Patient was encouraged to not talk while walking. \par \par problem : out of shape / overweight\par -Weight loss, exercise, and diet control were discussed and are highly encouraged. Treatment options were given such as, aqua therapy, and contacting a nutritionist. Recommended to use the elliptical, stationary bike, less use of treadmill. Mindful eating was explained to the patient Obesity is associated with worsening asthma, shortness of breath, and potential for cardiac disease, diabetes, and other underlying medical conditions\par \par problem : health maintenance \par -recommended yearly flu shot after October 15\par -recommended strep pneumonia vaccines: Prevnar-13 vaccine, followed by Pneumo vaccine 23 one year following after 65\par -recommended early intervention for Upper Respiratory Infections (URIs)\par -recommended regular osteoporosis evaluations\par -recommended early dermatological evaluations\par -recommended after the age of 50 to the age of 70, colonoscopy every 5 years\par \par F/U in 6-8 weeks.\par She is encouraged to call with any changes, concerns, or questions

## 2022-07-28 NOTE — HISTORY OF PRESENT ILLNESS
[TextBox_4] : Ms. BAKER is a 42 year old female with a history of fibroid surgery, IBS, hypothyroid, malabsorption, thyroid surgery with parathyroids injury (2010), covid-19 infection, benign breast tumor, post-covid sob,  presenting to the office today for initial pulmonary evaluation. Her chief complaint is vocal cord dysfunction/ paralysis, metastatic thyroid, sob \par -she notes coughing up a lot, discomfort, fever, felt under water, \par -she notes feeling tired always \par -she notes not feeling rested in the morning when she wakes up \par -she notes taking smaller bites because she feels like she cant breathe\par -she notes even talking she needs to take breaks \par -she notes sleeping 6 hrs/night \par -she notes snoring sometimes \par -she notes orthopnea sometimes \par -she notes she used to sleep on 2 pillows \par -she notes focusing is difficult for her \par -she notes memory and concentration are alright \par -she notes feeling lump in her throat \par -she notes semiparalysis in vocal cords \par -she notes getting some numbness and may be related to calcium \par -she denies any headaches, nausea, vomiting, fever, chills, sweats, chest pain, chest pressure, diarrhea, constipation, dysphagia, dizziness, leg swelling, leg pain, itchy eyes, itchy ears, heartburn, reflux, sour taste in the mouth, myalgias or arthralgias.

## 2022-07-28 NOTE — PROCEDURE
[FreeTextEntry1] : PFT - spi reveals mild restrictive flows; FEV1 is 2.74 which is 100% of predicted, normal flow volume loop\par \par 6 minute walk test reveals a low saturation of 97% with moderate evidence of dyspnea or fatigue; walked 326  meters \par \par FENO was   12    ; a normal value being less than 25\par Fractional exhaled nitric oxide (FENO) is regarded as a simple, noninvasive method for assessing eosinophilic airway inflammation. Produced by a variety of cells within the lung, nitric oxide (NO) concentrations are generally low in healthy individuals. However, high concentrations of NO appear to be involved in nonspecific host defense mechanisms and chronic inflammatory diseases such as asthma. The American Thoracic Society (ATS) therefore has recommended using FENO to aid in the diagnosis and monitoring of eosinophilic airway inflammation and asthma, and for identifying steroid responsive individuals whose chronic respiratory symptoms may be caused by airway inflammation.

## 2022-07-28 NOTE — ADDENDUM
Patient:  Ga Landrum Location:  [place]   :  Oct 19, 1988 Attending Physician:  Sam Bingham M.D.     Date:  Aug 21, 2019 Note Type: Progress Note       Complaint/Presenting Problem:  Hodgkin's Lymphoma, classic, nodular sclerosis  ABVD x4, AVD x2 - completed 2018    HPI:  Ga is a 30 y/o male with history of cutaneous scleroderma who was admitted to [facility] after presenting with persistent cough and pruritic rash, found to have diffuse chest lymphadenopathy on CT.  He underwent excisional axillary lymph node biopsy that confirmed classic Hodgkin's lymphoma, nodular sclerosis subtype.  He underwent PET/CT that showed disease in mediastinum, left hilum, right axillary, bilateral supraclavicular with SUV ~7-10.  He did not have splenic, bone marrow, or liver involvement on imaging.  He had labs inpatient that showed normal wbc, hgb; low albumin (3.3).  Uric acid was elevated at 8.7 with normal renal function - he was started on and continues allopurinol 300mg daily (downtrended to 5.3).  He continues to deny any B symptoms.  He is feeling very anxious about the diagnosis.  He does not smoke and has no known lung disease.  We discussed staging IIA with high risk features (male sex, >4 dian sites).  We will need sed rate testing.  We discussed options for therapy and my recommendation.  He does not want to see reproductive medicine to discuss sperm banking.    3/13/2018: Ridge returns with his sister and mom for follow up visit.  He had echo and PFTs completed (today).  His cough has improved somewhat.  He reports right axillary pain at biopsy site.  He denies fevers/chills.  He was able to see oncologist for second opinion and is ready to proceed with therapy.    3/30/2018: Ridge returns for follow up visit.  He reports doing well since chemo 2 weeks ago.  He denies any fevers/chills.  He reports that his cough has completely resolved.  He denies any shortness of breath.  He is eating well and staying  [FreeTextEntry1] : Documented by William Foley acting as a scribe for Dr. Dago Cooper on 07/28/2022 .\par \par All medical record entries made by the Scribe were at my, Dr. Dago Cooper's, direction and personally dictated by me on. I have reviewed the chart and agree that the record accurately reflects my personal performance of the history, physical exam, assessment and plan. I have also personally directed, reviewed, and agree with the discharge instructions.  active.    4/14/2018: Ridge returns for follow up visit prior to cycle 2 of ABVD.  He is feeling well without significant complaints.  Specifically he denies any shortness of breath or dyspnea with exertion.  No cough - previous cough has completely resolved.  He points out that he has had some right forearm swelling since PET/CT; no pain.  No fevers/chills.    5/11/2018: Ridge returns for follow up visit.  He reports flank pain has resolved - resolved prior to imaging study that was overall negative.  No fevers/chills, infectious symptoms.  No shortness of breath or cough.    6/8/2018: Ridge returns for follow up visit.  He had a few episodes of sternal pain after last cycle - believes related to anxiety.  It has now resolved.  He denies any shortness of breath.  We discussed PET/CT results and our plan for referral to RT for ISRT.  We will proceed with cycle 4 of chemotherapy.    7/13/2018: Ridge returns for follow up visit.  He has seen Dr. Drew for radiation evaluation - due to multiple sites of pre-treatment disease, thought to be high risk of radiation toxicity with underlying scleroderma.  We had a long discussion about treatment options going forward, and our plan for PET/CT in order to guide our decision.    7/23/2018: Ridge returns for follow up after his PET/CT.  His scan showed continued response in the right axilla, with decrease in size and also now deauville 2 (from 3).  We discussed that given these findings, we will proceed with 2 additional doses of chemotherapy without bleomycin, plus or minus targeted RT to the axilla upon completion.  He is in agreement with this plan.    8/24/2018: Ridge returns for follow up visit and for cycle 6 of chemotherapy (AVD).  He continues to tolerate therapy overall well except the anticipatory vomiting.  He denies fevers/chills.  No shortness of breath or chest pain.    9/21/18: Ridge returns for follow up visit.  He has completed the 6th cycle of treatment  and returns to discuss PET/CT results.  Scan looks overall unchanged from prior, with deauville score 2.  We discussed that this was considered a favorable response to therapy and that we will repeat PET/CT in 8 weeks.  I do not feel strongly about proceeding with consolidation RT to the axilla given it's overall low score and stability.    11/19/2018: Ridge returns for follow up visit.  He continues to do well other than feeling very anxious coming in today.  We discussed his PET/CT continues to show improvement - he is in CR after his treatment.  We discussed plan for surveillance and need to see PCP and establish care with rheumatology.    2/14/2019: Ridge returns for his 3 month surveillance visit.  He reports doing overall well - no fevers/chills, night sweats, cough, lymphadenopathy.  However he admits to anxiety symptoms; loss of sexual desire, fatigue.  He also notes occasional palpitations and was told he had an irregular heart beat by alternative medicine doctor.  He has not seen rheumatology yet to establish care.    5/21/2019: Ridge returns for follow up visit.  He lost his medicare coverage and was not able to make appointments with consultants as recommended last visit.  He had felt very well until last week when he had fatigue, decrease in appetite - now that has resolved and he is back to baseline.  He noted some fullness in his right axilla - but has been working out more than usual.  Currently he denies fevers, chills, lack of appetite, night sweats.  We discussed his CBC and CT imaging findings.    8/21/19: Returns for follow-up. Since his last appointment he was admitted with an NSTEMI in June 2019 with AIYANA to LAD and circumflex. He is on ASA and ticagrelor in addition to atorvastatin and Zetia. He feels well, and has been losing weight with a combination of diet and exercise. He denies fever or night sweats.    PMH:   Mr. Landrum's medical history consists of cutaneous scleroderma (diagnosed at  1 yrs old).    Current Medications:   Bleomycin Sulfate, DOXOrubicin HCl, Dacarbazine, Ondansetron HCl, Prochlorperazine Maleate, VinBLAStine Sulfate  There is no information available for as per patient medications for Current Medications.  Medications were reviewed and updated.      Allergies:   No Known Allergies.  Allergies were reviewed. No new allergies reported.    Past Surgical History:  Mr. Landrum's surgical/procedural history consists of L knee surgery x 2, R axillary LN bx in 2018 - [facility], and surgery for scleroderma in  - [facility].    Family History:  His maternal grandfather is : Prostate Cancer.     Personal/Social History:   Mr. Landrum is single and he is unemployed. Mr. Landrum has never smoked. He has no history of drinking. Mr. Landrum reports no contact with hazardous material.   Mr. Landrum reports the following support systems: lives with spouse, significant other, family, or friends. His diet consists of regular meals. He indicates his activity level as: daily activities.     Review of Systems:     Constitutional Normal - Denies lack of appetite, fatigue, fever, lethargy, malaise, night sweats, rigors / chills and change in weight.   Hematologic/Lymphatic Normal - Right axillary fullness on/off; no LND   Respiratory Normal - No shortness of breath or cough   Cardiovascular Abnormal - +palpitations   Gastrointestinal Normal - Denies abdominal pain, change in bowel habits, constipation, diarrhea, heartburn / dyspepsia, hematemesis, hematochezia, hemorrhoids, melena / GI bleeding, nausea, pain / cramping, satiety and vomiting.   Genitourinary (M) Normal - Denies dysuria, frequency, hematuria   Musculoskeletal Abnormal - +LLE scleroderma   Integumentary Abnormal - +scleroderma   Psychiatric Abnormal - +anxiety       Physical Examination:  Performed on Aug 21, 2019 14:12  Height - 176.20 cms   Weight - 98.90 kg (LOW) with shoes   BSA - 2.15 sq.m   BMI - 31.8600 (HIGH)    Temperature - 95.6 F (LOW)   Pulse - 63 /min   Respiration - 14 /min   BP - 146/65 mm(hg) (HIGH)   Pain - 0  0 - Fully active, able to carry on all predisease activities without restrictions. (ECOG)    Constitutional No evidence of impaired alertness, inadequate appearance, premature or advanced chronologic age, uncooperativeness, developmental delays, altered mood and affect and disorientation.   Neck No evidence of distension, tender or enlarged lymph nodes, neck abnormalities, restricted range of motion and enlarged thyroid gland.   Hematologic/Lymphatic +right sided axillary scar tissue   Respiratory No evidence of abnormal breath sounds, chest abnormalities on palpation and chest abnormalities on percussion.   Cardiovascular No evidence of arterial pulse(s) abnormalities, abnormal heart rate, heart arrhythmia and abnormal heart sounds.   Abdomen No evidence of abdominal abnormalities, abnormal bowel sounds, hepatomegaly and splenomegaly.   Extremities LLE scleroderma changes   Psychiatric No evidence of altered affect, lack of comprehension and disorientation.        Laboratory:  Test performed on Aug 20, 2019 14:00    Glucose 105 mg/dL(HIGH)  LDH, Total 170 Units/L    BUN 17 mg/dL  Creatinine 0.92 mg/dL    Creatinine Clearance (Est) 161.25 mL/min  GFR -American > 90 mL/min    GFR Non-African-American > 90 mL/min  BUN/Creatinine Ratio 18     Sodium 140 mmol/L  Potassium 4.4 mmol/L    Chloride 106 mmol/L  CO2 30 mmol/L    Calcium 9.9 mg/dL  Protein, Total 7.3 g/dL    Albumin 4.3 g/dL  Globulin 3.0 g/dL    A/G Ratio 1.4   Bilirubin, Total 0.7 mg/dL    Alkaline Phosphatase 100 Units/L  AST (SGOT) 96 Units/L(HIGH)    ALT (SGPT) 257 Units/L(HIGH)  Anion Gap 8 mmol/L(LOW)    Fasting Status (Quant) 0 hrs  WBC 5.3 K/mcL    RBC 6.15 mil/mcL(HIGH)  Hgb 16.7 g/dL    HCT 50.0 %  MCV 81.3 fl    MCH 27.2 pg  MCHC 33.4 g/dL    RDW- CV 16.2 %(HIGH)  Platelet Count 145 K/mcL    Diff Type AUTOMATED DIFFERENTIAL    Neutrophils 3.7 K/mcL    Lymphocytes 1.1 K/mcL  Mid Cells 0.5 K/mcL    Neutrophil % 69 %  Lymphocyte % 21 %    Mid Cells % 10 %        These labs have been reviewed with the patient and he verbalizes understanding of the results.      Pain Assessment Score: 0  Current Pain Management: n/a  Effective: n/a      Counseling/Teaching (Time of counseling/Time of visit): 15/20    Impression:   Mr. Landrum is a 30 y/o male with h/o cutaneous scleroderma and newly diagnosed classic hodgkin's lymphoma, nodular sclerosis subtype.  PET/CT shows involvement of mediastinum, left hilar, right axillary, and bilateral supraclavicular disease (although no bulky disease).  He has stage IIA nonbulky disease, unfavorable due to 4 sites of disease; risk factors include male gender, albumin <4, and >3 dian site involvement. Sed rate is normal.  Started ABVD and completed 3 cycles with repeat PET/CT showing Deauville score = 3, therefore he received 1 additional AVBD.  Due to inability to give ISRT (multiple sites of disease in chest in patient with scleroderma), we then proceeded with additional 2 cycles of AVD (omitted bleo) for total of 6 cycles of chemotherapy, completed 9/2018.  PET/CT after cycle 6 showed deauville score = 2; 2 months later showed deauville score = 1 -> IN CR.    Proceeded with surveillance plan: clinic visit and labs q3 months; CT every 6 months for 2 years, then PRN    CT scan completed in May shows no evidence of disease relapse.  His CBC is normal.  Clinically he is doing well from a lymphoma standpoint.    Other issues:  1) Anxiety  2) ?Palpitations  3) Scleroderma  4) Elevated liver enzymes    Plan(Problem-oriented):  - Labs and follow up visit in 3 months  -CT C/A/P prior to next appointment  - Referral to psychiatry, cardio-oncology, and rheumatology previously, but has yet to schedule an appointment -> will make apts when insurance is approved  - Continue to follow with primary care doctor and  cardiologist- Advised that he follow-up with his PCP ASA regarding the elevated liver enzymes.    Return Appointment:        Electronically signed by:   Gustavo Zayas MD    cc:

## 2022-08-02 ENCOUNTER — NON-APPOINTMENT (OUTPATIENT)
Age: 42
End: 2022-08-02

## 2022-08-02 ENCOUNTER — LABORATORY RESULT (OUTPATIENT)
Age: 42
End: 2022-08-02

## 2022-08-02 LAB
24R-OH-CALCIDIOL SERPL-MCNC: 62.2 PG/ML
25(OH)D3 SERPL-MCNC: 43.5 NG/ML
BASOPHILS # BLD AUTO: 0.07 K/UL
BASOPHILS NFR BLD AUTO: 0.8 %
CRP SERPL HS-MCNC: 4.26 MG/L
EOSINOPHIL # BLD AUTO: 0.1 K/UL
EOSINOPHIL NFR BLD AUTO: 1.2 %
ERYTHROCYTE [SEDIMENTATION RATE] IN BLOOD BY WESTERGREN METHOD: 11 MM/HR
HCT VFR BLD CALC: 41.6 %
HGB BLD-MCNC: 13.8 G/DL
IMM GRANULOCYTES NFR BLD AUTO: 0.6 %
LYMPHOCYTES # BLD AUTO: 2.87 K/UL
LYMPHOCYTES NFR BLD AUTO: 34.3 %
MAN DIFF?: NORMAL
MCHC RBC-ENTMCNC: 28 PG
MCHC RBC-ENTMCNC: 33.2 GM/DL
MCV RBC AUTO: 84.4 FL
MONOCYTES # BLD AUTO: 0.57 K/UL
MONOCYTES NFR BLD AUTO: 6.8 %
NEUTROPHILS # BLD AUTO: 4.7 K/UL
NEUTROPHILS NFR BLD AUTO: 56.3 %
PLATELET # BLD AUTO: 352 K/UL
RBC # BLD: 4.93 M/UL
RBC # FLD: 13.6 %
WBC # FLD AUTO: 8.36 K/UL

## 2022-08-04 LAB
A ALTERNATA IGE QN: <0.1 KUA/L
A ALTERNATA IGE QN: <0.1 KUA/L
A FUMIGATUS IGE QN: <0.1 KUA/L
A FUMIGATUS IGE QN: <0.1 KUA/L
BERMUDA GRASS IGE QN: <0.1 KUA/L
BOXELDER IGE QN: <0.1 KUA/L
C ALBICANS IGE QN: <0.1 KUA/L
C HERBARUM IGE QN: <0.1 KUA/L
C HERBARUM IGE QN: <0.1 KUA/L
CALIF WALNUT IGE QN: <0.1 KUA/L
CAT DANDER IGE QN: <0.1 KUA/L
CAT DANDER IGE QN: <0.1 KUA/L
CLAM IGE QN: <0.1 KUA/L
CMN PIGWEED IGE QN: <0.1 KUA/L
CODFISH IGE QN: <0.1 KUA/L
COMMON RAGWEED IGE QN: <0.1 KUA/L
COMMON RAGWEED IGE QN: <0.1 KUA/L
CORN IGE QN: <0.1 KUA/L
COTTONWOOD IGE QN: <0.1 KUA/L
COW MILK IGE QN: <0.1 KUA/L
D FARINAE IGE QN: <0.1 KUA/L
D FARINAE IGE QN: <0.1 KUA/L
D PTERONYSS IGE QN: <0.1 KUA/L
D PTERONYSS IGE QN: <0.1 KUA/L
DEPRECATED A ALTERNATA IGE RAST QL: 0
DEPRECATED A ALTERNATA IGE RAST QL: 0
DEPRECATED A FUMIGATUS IGE RAST QL: 0
DEPRECATED A FUMIGATUS IGE RAST QL: 0
DEPRECATED BERMUDA GRASS IGE RAST QL: 0
DEPRECATED BOXELDER IGE RAST QL: 0
DEPRECATED C ALBICANS IGE RAST QL: 0
DEPRECATED C HERBARUM IGE RAST QL: 0
DEPRECATED C HERBARUM IGE RAST QL: 0
DEPRECATED CAT DANDER IGE RAST QL: 0
DEPRECATED CAT DANDER IGE RAST QL: 0
DEPRECATED CLAM IGE RAST QL: 0
DEPRECATED CODFISH IGE RAST QL: 0
DEPRECATED COMMON PIGWEED IGE RAST QL: 0
DEPRECATED COMMON RAGWEED IGE RAST QL: 0
DEPRECATED COMMON RAGWEED IGE RAST QL: 0
DEPRECATED CORN IGE RAST QL: 0
DEPRECATED COTTONWOOD IGE RAST QL: 0
DEPRECATED COW MILK IGE RAST QL: 0
DEPRECATED D FARINAE IGE RAST QL: 0
DEPRECATED D FARINAE IGE RAST QL: 0
DEPRECATED D PTERONYSS IGE RAST QL: 0
DEPRECATED D PTERONYSS IGE RAST QL: 0
DEPRECATED DOG DANDER IGE RAST QL: NORMAL
DEPRECATED DOG DANDER IGE RAST QL: NORMAL
DEPRECATED DUCK FEATHER IGE RAST QL: 0
DEPRECATED EGG WHITE IGE RAST QL: 0
DEPRECATED GOOSE FEATHER IGE RAST QL: 0
DEPRECATED GOOSEFOOT IGE RAST QL: 0
DEPRECATED LONDON PLANE IGE RAST QL: 0
DEPRECATED M RACEMOSUS IGE RAST QL: 0
DEPRECATED MOUSE URINE PROT IGE RAST QL: 0
DEPRECATED MUGWORT IGE RAST QL: 0
DEPRECATED P NOTATUM IGE RAST QL: 0
DEPRECATED PEANUT IGE RAST QL: 0
DEPRECATED RED CEDAR IGE RAST QL: 0
DEPRECATED ROACH IGE RAST QL: NORMAL
DEPRECATED ROACH IGE RAST QL: NORMAL
DEPRECATED SCALLOP IGE RAST QL: <0.1 KUA/L
DEPRECATED SESAME SEED IGE RAST QL: 0
DEPRECATED SHEEP SORREL IGE RAST QL: 0
DEPRECATED SHRIMP IGE RAST QL: 0
DEPRECATED SILVER BIRCH IGE RAST QL: 0
DEPRECATED SOYBEAN IGE RAST QL: 0
DEPRECATED TIMOTHY IGE RAST QL: 0
DEPRECATED TIMOTHY IGE RAST QL: 0
DEPRECATED WALNUT IGE RAST QL: 0
DEPRECATED WHEAT IGE RAST QL: 0
DEPRECATED WHITE ASH IGE RAST QL: 0
DEPRECATED WHITE OAK IGE RAST QL: 0
DEPRECATED WHITE OAK IGE RAST QL: 0
DOG DANDER IGE QN: 0.19 KUA/L
DOG DANDER IGE QN: 0.19 KUA/L
DUCK FEATHER IGE QN: <0.1 KUA/L
EGG WHITE IGE QN: <0.1 KUA/L
GOOSE FEATHER IGE QN: <0.1 KUA/L
GOOSEFOOT IGE QN: <0.1 KUA/L
LONDON PLANE IGE QN: <0.1 KUA/L
M RACEMOSUS IGE QN: <0.1 KUA/L
MOUSE URINE PROT IGE QN: <0.1 KUA/L
MUGWORT IGE QN: <0.1 KUA/L
MULBERRY (T70) CLASS: 0
MULBERRY (T70) CONC: <0.1 KUA/L
P NOTATUM IGE QN: <0.1 KUA/L
PEANUT IGE QN: <0.1 KUA/L
RED CEDAR IGE QN: <0.1 KUA/L
ROACH IGE QN: 0.13 KUA/L
ROACH IGE QN: 0.13 KUA/L
SCALLOP IGE QN: 0
SCALLOP IGE QN: <0.1 KUA/L
SESAME SEED IGE QN: <0.1 KUA/L
SHEEP SORREL IGE QN: <0.1 KUA/L
SILVER BIRCH IGE QN: <0.1 KUA/L
SOYBEAN IGE QN: <0.1 KUA/L
TIMOTHY IGE QN: <0.1 KUA/L
TIMOTHY IGE QN: <0.1 KUA/L
TOTAL IGE SMQN RAST: 34 KU/L
TREE ALLERG MIX1 IGE QL: 0
WALNUT IGE QN: <0.1 KUA/L
WHEAT IGE QN: <0.1 KUA/L
WHITE ASH IGE QN: <0.1 KUA/L
WHITE ELM IGE QN: 0
WHITE ELM IGE QN: <0.1 KUA/L
WHITE OAK IGE QN: <0.1 KUA/L
WHITE OAK IGE QN: <0.1 KUA/L

## 2022-08-10 ENCOUNTER — APPOINTMENT (OUTPATIENT)
Dept: ULTRASOUND IMAGING | Facility: CLINIC | Age: 42
End: 2022-08-10

## 2022-08-10 ENCOUNTER — OUTPATIENT (OUTPATIENT)
Dept: OUTPATIENT SERVICES | Facility: HOSPITAL | Age: 42
LOS: 1 days | End: 2022-08-10
Payer: COMMERCIAL

## 2022-08-10 DIAGNOSIS — Z98.890 OTHER SPECIFIED POSTPROCEDURAL STATES: Chronic | ICD-10-CM

## 2022-08-10 DIAGNOSIS — Z00.8 ENCOUNTER FOR OTHER GENERAL EXAMINATION: ICD-10-CM

## 2022-08-10 DIAGNOSIS — R06.02 SHORTNESS OF BREATH: ICD-10-CM

## 2022-08-10 PROCEDURE — 76604 US EXAM CHEST: CPT

## 2022-08-10 PROCEDURE — 76604 US EXAM CHEST: CPT | Mod: 26

## 2022-08-11 ENCOUNTER — NON-APPOINTMENT (OUTPATIENT)
Age: 42
End: 2022-08-11

## 2022-08-16 ENCOUNTER — APPOINTMENT (OUTPATIENT)
Dept: SURGICAL ONCOLOGY | Facility: CLINIC | Age: 42
End: 2022-08-16

## 2022-08-29 ENCOUNTER — APPOINTMENT (OUTPATIENT)
Dept: GASTROENTEROLOGY | Facility: CLINIC | Age: 42
End: 2022-08-29

## 2022-08-29 VITALS
HEIGHT: 63 IN | DIASTOLIC BLOOD PRESSURE: 70 MMHG | HEART RATE: 81 BPM | BODY MASS INDEX: 19.89 KG/M2 | OXYGEN SATURATION: 99 % | TEMPERATURE: 97.7 F | SYSTOLIC BLOOD PRESSURE: 100 MMHG | WEIGHT: 112.25 LBS

## 2022-08-29 PROCEDURE — 99213 OFFICE O/P EST LOW 20 MIN: CPT

## 2022-08-29 NOTE — HISTORY OF PRESENT ILLNESS
[Heartburn] : denies heartburn [Nausea] : resolved nausea [Vomiting] : denies vomiting [Diarrhea] : stable diarrhea [Yellow Skin Or Eyes (Jaundice)] : denies jaundice [Abdominal Pain] : denies abdominal pain [Abdominal Swelling] : denies abdominal swelling [Rectal Pain] : denies rectal pain [de-identified] : Dicyclomine controlled her symptoms of alternating diarrhea and constipation fairly well; however, she was unable to tolerate even a 10 mg dose as it resulted in dizziness and nausea.  Without dicyclomine she will go from having no bowel movements for 4 to 5 days to subsequently having multiple watery bowel movements daily for several days thereafter.  Tried lactose-free and low FODMAP diet without success.  No abdominal pain blood or mucus in stool.

## 2022-08-29 NOTE — ASSESSMENT
[FreeTextEntry1] : Impression: Irritable bowel syndrome with alternating constipation and diarrhea.  Symptoms improved with dicyclomine but unable to tolerate even a low dose.  Would likely have similar side effects with other anticholinergic and daily spasmodics.\par \par Plan: We will try MiraLAX daily and prescribe Lomotil 3 times daily as needed.  If unable to control symptoms consider alternate IBS medication

## 2022-09-06 ENCOUNTER — FORM ENCOUNTER (OUTPATIENT)
Age: 42
End: 2022-09-06

## 2022-09-06 ENCOUNTER — APPOINTMENT (OUTPATIENT)
Dept: SURGICAL ONCOLOGY | Facility: CLINIC | Age: 42
End: 2022-09-06

## 2022-09-06 VITALS
OXYGEN SATURATION: 98 % | SYSTOLIC BLOOD PRESSURE: 124 MMHG | HEART RATE: 63 BPM | WEIGHT: 115 LBS | BODY MASS INDEX: 20.38 KG/M2 | TEMPERATURE: 97.8 F | RESPIRATION RATE: 15 BRPM | HEIGHT: 63 IN | DIASTOLIC BLOOD PRESSURE: 85 MMHG

## 2022-09-06 PROCEDURE — 99214 OFFICE O/P EST MOD 30 MIN: CPT

## 2022-09-06 NOTE — PHYSICAL EXAM
[FreeTextEntry1] : SC was present during the exam  [de-identified] : Normal S1, S2. regular rate and rhythm.  [de-identified] : Complete breast exam performed in supine and upright positions. No palpable masses, tenderness, nipple discharge, inversion, deviation or enlarged axillary or supraclavicular lymph nodes bilaterally.  [de-identified] : Clear breath sounds bilaterally, normal respiratory effort.

## 2022-09-06 NOTE — ASSESSMENT
[FreeTextEntry1] : IMP: 42 year old female initially seen regarding right breast mass.\par She is s/p USGB of the right breast mass as well as an MRI-guided right breast biopsy 1/2022 with benign findings\par \par PLAN: \par Return in one year\par \par \par All medical entries were at my, Dr. Justin Robles, direction. I have reviewed the chart and agree that the record accurately reflects my personal performance of the history, physical exam, assessment and plan. Our office Nurse Practitioner was present of the duration of the office visit.

## 2022-09-06 NOTE — HISTORY OF PRESENT ILLNESS
[de-identified] : Ms. DEREK BAKER is a 42 year old female who returns for follow up.  She was initially seen in consultation on 8/2/21 regarding a right breast mass.\par \par Past medical history: thyroid cancer at age 30, Asthma \par Family history: breast cancer in maternal grandmother at age 70. \par \par B/L mammo/sono 7/2021: Right breast 8:00 4cm from the nipple is a well-circumscribed benign-appearing hypoechoic nodule measuring 15 x 5 x 10mm. This finding appear increased in size possibly relating to difference in technique. Recommended for 6 \par months right breast US. BIRADS 3. \par \par She had a follow up right breast ultrasound on 1/4/22.  There is no sonographic correlate for the area of nonmass enhacenment noted on prior MRI.  MRI biopsy was previously advised.  Circumscribed mass at the 8:00 position of the right breast has increased in size, ultrasound-guided biopsy is recommended (BIRADS 4a). \par \par She is s/p ultrasound-guided right breast biopsy on 1/18/22.  Pathology demonstrated a myxoid fibroadenoma, benign and concordant.  MRI-guided right breast biopsy was also performed with benign findings. \par \par Bilateral mammogram/sonogram 7/2022- No evidence of malignancy BI-RADS 1\par \par  Today she is without any complaints. Denies palpable breast masses, nipple discharge, skin changes, inversion or breast pain. Denies constitutional symptoms.

## 2022-09-07 ENCOUNTER — OUTPATIENT (OUTPATIENT)
Dept: OUTPATIENT SERVICES | Facility: HOSPITAL | Age: 42
LOS: 1 days | End: 2022-09-07
Payer: COMMERCIAL

## 2022-09-07 ENCOUNTER — APPOINTMENT (OUTPATIENT)
Dept: SLEEP CENTER | Facility: CLINIC | Age: 42
End: 2022-09-07

## 2022-09-07 DIAGNOSIS — Z98.890 OTHER SPECIFIED POSTPROCEDURAL STATES: Chronic | ICD-10-CM

## 2022-09-07 PROCEDURE — 95806 SLEEP STUDY UNATT&RESP EFFT: CPT | Mod: 26

## 2022-09-07 PROCEDURE — 95806 SLEEP STUDY UNATT&RESP EFFT: CPT

## 2022-09-13 ENCOUNTER — NON-APPOINTMENT (OUTPATIENT)
Age: 42
End: 2022-09-13

## 2022-09-23 NOTE — PHYSICAL EXAM
[de-identified] : Tenderness to palpation over the right ECRB\par Neurologically intact distally and symmetrical\par otherwise, 5 out of 5 motor strength, sensation is intact and symmetrical full range of motion flexion extension and rotation, no palpatory tenderness full range of motion of hips knees shoulders and elbows (all four extremities), no atrophy, negative straight leg raise, no pathological reflexes, no swelling, normal ambulation, no apparent distress skin is intact, no palpable lymph nodes, no upper or lower extremity instability, alert and oriented x3 and normal mood. Normal finger-to nose test.  [de-identified] : 3 view of the right elbow are negative for any obvious fracture or dislocation.  The patient understands that this is a wet read and I am not a radiologist.\par If the final read reveals something different than discussed in the office the patient will get a call back in the \par next 24-48 hours to discuss.  general

## 2022-09-28 NOTE — ASU DISCHARGE PLAN (ADULT/PEDIATRIC) - OK TO LEAVE MESSAGE ON VOICEMAIL
Breo not covered by insurance  Patient has to either try [facility] or [facility] [address] [place] [address] ordered 
Yes

## 2022-10-03 ENCOUNTER — APPOINTMENT (OUTPATIENT)
Dept: PULMONOLOGY | Facility: CLINIC | Age: 42
End: 2022-10-03

## 2022-10-03 VITALS
BODY MASS INDEX: 20.38 KG/M2 | SYSTOLIC BLOOD PRESSURE: 115 MMHG | DIASTOLIC BLOOD PRESSURE: 60 MMHG | WEIGHT: 115 LBS | RESPIRATION RATE: 15 BRPM | TEMPERATURE: 98.3 F | HEIGHT: 63 IN | HEART RATE: 79 BPM | OXYGEN SATURATION: 98 %

## 2022-10-03 DIAGNOSIS — R06.02 SHORTNESS OF BREATH: ICD-10-CM

## 2022-10-03 DIAGNOSIS — U09.9 SHORTNESS OF BREATH: ICD-10-CM

## 2022-10-03 PROCEDURE — 94727 GAS DIL/WSHOT DETER LNG VOL: CPT

## 2022-10-03 PROCEDURE — 94729 DIFFUSING CAPACITY: CPT

## 2022-10-03 PROCEDURE — 99214 OFFICE O/P EST MOD 30 MIN: CPT | Mod: 25

## 2022-10-03 PROCEDURE — ZZZZZ: CPT

## 2022-10-03 PROCEDURE — 95012 NITRIC OXIDE EXP GAS DETER: CPT

## 2022-10-03 PROCEDURE — 94010 BREATHING CAPACITY TEST: CPT

## 2022-10-03 NOTE — ADDENDUM
[FreeTextEntry1] : Documented by William Foley acting as a scribe for Dr. Dago Cooper on 10/03/2022 .\par \par All medical record entries made by the Scribe were at my, Dr. Dago Cooper's, direction and personally dictated by me on. I have reviewed the chart and agree that the record accurately reflects my personal performance of the history, physical exam, assessment and plan. I have also personally directed, reviewed, and agree with the discharge instructions.

## 2022-10-03 NOTE — HISTORY OF PRESENT ILLNESS
[TextBox_4] : Ms. BAKER is a 42 year old female with a history of fibroid surgery, IBS, hypothyroid, malabsorption, thyroid surgery with parathyroids injury (2010), covid-19 infection, benign breast tumor, post-covid sob,  presenting to the office today for f/p pulmonary evaluation. Her chief complaint is vocal cord dysfunction/ paralysis, metastatic thyroid, sob \par -she notes her voice is gone\par -she notes she has hoaraseness \par -she notes taking the nose spray \par -she notes taking the inhalers \par -she notes not feeling the greatest \par -she notes compromised physically \par -she notes chest tightness when she runs \par -she notes sleeping on her back inclined but cant be straight back \par -pt denies any new medications, supplements, or vitamins \par patient denies any headaches, nausea, vomiting, fever, chills, sweats, chest pain, chest pressure, palpitations, coughing, wheezing, fatigue, diarrhea, constipation, dysphagia, myalgias, dizziness, leg swelling, leg pain, itchy eyes, itchy ears, heartburn, reflux or sour taste in the mouth

## 2022-10-03 NOTE — PROCEDURE
[FreeTextEntry1] : Full PFT revealed normal flows, with a FEV1 of 2.9L,which is 109% of predicted,  normal lung volumes, and a diffusion of 21.8, which is 115% of predicted with a normal flow volume loop \par \par Sleep study (9/2022) shows no sleep apnea \par \par FENO was    11   ; a normal value being less than 25\par Fractional exhaled nitric oxide (FENO) is regarded as a simple, noninvasive method for assessing eosinophilic airway inflammation. Produced by a variety of cells within the lung, nitric oxide (NO) concentrations are generally low in healthy individuals. However, high concentrations of NO appear to be involved in nonspecific host defense mechanisms and chronic inflammatory diseases such as asthma. The American Thoracic Society (ATS) therefore has recommended using FENO to aid in the diagnosis and monitoring of eosinophilic airway inflammation and asthma, and for identifying steroid responsive individuals whose chronic respiratory symptoms may be caused by airway inflammation.

## 2022-10-03 NOTE — ASSESSMENT
[FreeTextEntry1] : Ms. BAKER is a 42 year old female with a history of fibroid surgery, IBS, hypothyroid, malabsorption, thyroid surgery with parathyroids injury (2010), covid-19 infection, benign breast tumor, post-covid sob,  presenting to the office today for initial pulmonary evaluation. 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 Asthma may be triggered by allergens, lung infections, or irritants in the air. Asthma triggers are different for each person \par Inhaler technique reviewed as well as oral hygiene techniques reviewed with patient. Avoidance of cold air, extremes of temperature, rescue inhaler should be used before exercise. Order of medication reviewed with patient. Recommended use of a cool mist humidifier in the bedroom. 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 \par \par Problem 5:  ?LPR \par -Add Pepcid 40mg QHS \par -Rule of 2s: avoid eating too much, eating too late, eating too spicy, eating two hours before bed.\par -Things to avoid including overeating, spicy foods, tight clothing, eating within three hours of bed, this list is not all inclusive. \par -For treatment of reflux, possible options discussed including diet control, H2 blockers, PPIs, as well as coating motility agents discussed as treatment options. Timing of meals and proximity of last meal to sleep were discussed. If symptoms persist, a formal gastrointestinal evaluation is needed. \par \par Problem 6: Long Covid-19 \par -Recommended Johnathan Katja book on 10 day Detox \par -Recommended Low Histamine Diet \par \par Problem 7: Poor sleep/ fatigue \par -Complete home sleep study \par Sleep apnea is associated with adverse clinical consequences which can affect most organ systems.  Cardiovascular disease risk includes arrhythmias, atrial fibrillation, hypertension, coronary artery disease, and stroke. Metabolic disorders include diabetes type 2, non-alcoholic fatty liver disease. Mood disorder especially depression; and cognitive decline especially in the elderly. Associations with  chronic reflux/Farmer’s esophagus some but not all inclusive. \par -Reasons  include arousal consistent with hypopnea; respiratory events most prominent in REM sleep or supine position; therefore sleep staging and body position are important for accurate diagnosis and estimation of AHI. \par \par problem : cardiac disease\par -Complete CRP \par -Complete Sed rate \par -recommended to continue to follow up with Cardiologist \par \par problem : poor breathing mechanics\par -Proper breathing techniques were reviewed with an emphasis of exhalation. Patient instructed to breath in for 1 second and out for four seconds. Patient was encouraged to not talk while walking. \par \par problem : out of shape / overweight\par -Weight loss, exercise, and diet control were discussed and are highly encouraged. Treatment options were given such as, aqua therapy, and contacting a nutritionist. Recommended to use the elliptical, stationary bike, less use of treadmill. Mindful eating was explained to the patient Obesity is associated with worsening asthma, shortness of breath, and potential for cardiac disease, diabetes, and other underlying medical conditions\par \par problem : health maintenance \par -Recommended ENT (Tucker Hernandez) \par -recommended yearly flu shot after October 15\par -recommended strep pneumonia vaccines: Prevnar-13 vaccine, followed by Pneumo vaccine 23 one year following after 65\par -recommended early intervention for Upper Respiratory Infections (URIs)\par -recommended regular osteoporosis evaluations\par -recommended early dermatological evaluations\par -recommended after the age of 50 to the age of 70, colonoscopy every 5 years\par \par F/U in 6-8 weeks.\par She is encouraged to call with any changes, concerns, or questions

## 2022-10-03 NOTE — REASON FOR VISIT
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

## 2022-10-11 DIAGNOSIS — G47.33 OBSTRUCTIVE SLEEP APNEA (ADULT) (PEDIATRIC): ICD-10-CM

## 2022-10-17 ENCOUNTER — NON-APPOINTMENT (OUTPATIENT)
Age: 42
End: 2022-10-17

## 2022-10-17 ENCOUNTER — LABORATORY RESULT (OUTPATIENT)
Age: 42
End: 2022-10-17

## 2022-10-17 ENCOUNTER — APPOINTMENT (OUTPATIENT)
Dept: CARDIOLOGY | Facility: CLINIC | Age: 42
End: 2022-10-17

## 2022-10-17 VITALS
TEMPERATURE: 98.7 F | DIASTOLIC BLOOD PRESSURE: 74 MMHG | WEIGHT: 114 LBS | SYSTOLIC BLOOD PRESSURE: 110 MMHG | RESPIRATION RATE: 16 BRPM | BODY MASS INDEX: 20.2 KG/M2 | HEIGHT: 63 IN | OXYGEN SATURATION: 98 % | HEART RATE: 83 BPM

## 2022-10-17 DIAGNOSIS — R06.83 SNORING: ICD-10-CM

## 2022-10-17 PROCEDURE — 93000 ELECTROCARDIOGRAM COMPLETE: CPT

## 2022-10-17 PROCEDURE — 99204 OFFICE O/P NEW MOD 45 MIN: CPT

## 2022-10-17 RX ORDER — DICYCLOMINE HYDROCHLORIDE 10 MG/1
10 CAPSULE ORAL 3 TIMES DAILY
Qty: 270 | Refills: 3 | Status: COMPLETED | COMMUNITY
Start: 2022-05-24 | End: 2022-10-17

## 2022-10-17 RX ORDER — NORETHINDRONE ACETATE AND ETHINYL ESTRADIOL AND FERROUS FUMARATE 1MG-20(21)
1-20 KIT ORAL
Refills: 0 | Status: COMPLETED | COMMUNITY
End: 2022-10-17

## 2022-10-17 NOTE — DISCUSSION/SUMMARY
[FreeTextEntry1] : This is a 42-year-old female with past medical history significant for thyroid cancer, status post partial thyroidectomy 12 years ago, childhood asthma, status post COVID-19 infection June 2022 and received monoclonal antibody infusion, status post cholecystectomy, status post removal of fibroid, who comes in for cardiac consultation.\par She denies palpitations, dizziness, syncope, or chest pressure.  She has no history of rheumatic fever.  She does not drink excessive caffeine or alcohol.\par She has no known cardiac risk factors.\par Electrocardiogram done October 17, 2022 demonstrated normal sinus rhythm rate of 82 bpm and is otherwise unremarkable\par The patient has been complaining of dyspnea on exertion atypical chest pain since the end of June 2022.\par The patient also complains of reproducible chest pain above the sternal area with running.  It is relieved with rubbing that area.  She has no symptoms at rest and no symptoms with walking.  She also reports to getting short of breath in cold weather when she is running.\par She recently started on pulmonary inhalers.\par She will schedule an echo Doppler examination to rule out pericardial fluid, myocardial inflammation, evaluate her left ventricular function, chamber size, rule out mitral valve prolapse and rule out hypertrophy.\par She will also schedule an exercise stress test to evaluate her symptoms when walking/running.\par I feel that her shortness of breath is likely secondary to reactive airway disease.\par Further recommendations were made after above-noted diagnostic tests are completed.\par The patient is encouraged to maintain good hydration.\par She will be starting her pulmonary inhalers and recommendations as per pulmonary medicine.  I will see if this improves her overall symptoms.\par Thank you for allowing to participate in care of your patient.  Please do not hesitate to call if you have any further questions.\par \par

## 2022-10-17 NOTE — PHYSICAL EXAM
[Well Developed] : well developed [Well Nourished] : well nourished [No Acute Distress] : no acute distress [Normal Conjunctiva] : normal conjunctiva [Normal Venous Pressure] : normal venous pressure [No Carotid Bruit] : no carotid bruit [Normal S1, S2] : normal S1, S2 [No Rub] : no rub [No Gallop] : no gallop [5th Left ICS - MCL] : palpated at the 5th LICS in the midclavicular line [Normal] : normal [No Precordial Heave] : no precordial heave was noted [Normal Rate] : normal [Normal S1] : normal S1 [Normal S2] : normal S2 [I] : a grade 1 [No Pitting Edema] : no pitting edema present [2+] : left 2+ [No Abnormalities] : the abdominal aorta was not enlarged and no bruit was heard [Clear Lung Fields] : clear lung fields [Good Air Entry] : good air entry [No Respiratory Distress] : no respiratory distress  [Soft] : abdomen soft [Non Tender] : non-tender [No Masses/organomegaly] : no masses/organomegaly [Normal Bowel Sounds] : normal bowel sounds [Normal Gait] : normal gait [No Edema] : no edema [No Cyanosis] : no cyanosis [No Varicosities] : no varicosities [No Clubbing] : no clubbing [No Rash] : no rash [No Skin Lesions] : no skin lesions [Moves all extremities] : moves all extremities [No Focal Deficits] : no focal deficits [Normal Speech] : normal speech [Alert and Oriented] : alert and oriented [Normal memory] : normal memory [S3] : no S3 [S4] : no S4 [Right Carotid Bruit] : no bruit heard over the right carotid [Left Carotid Bruit] : no bruit heard over the left carotid [Right Femoral Bruit] : no bruit heard over the right femoral artery [Left Femoral Bruit] : no bruit heard over the left femoral artery

## 2022-10-17 NOTE — REASON FOR VISIT
[CV Risk Factors and Non-Cardiac Disease] : CV risk factors and non-cardiac disease [FreeTextEntry3] : Dr. Tanner [FreeTextEntry1] : 42 year old female with history of thyroid cancer and COVID19 infection (June 2022) presents for cardiac consult. Patient has been experiencing episodes of shortness of breath and chest pain, especially worse when running. She has been an avid runner and has not been able to run the same since having COVID. She is still under care of [place] doctors for thyroid cancer which was diagnosed 12 years ago - she got surgery 12 years ago but they were unable to remove the full thyroid. She received monoclonal antibodies due to her cancer history.\par \par She has seen  and he states everything is good with her lungs.

## 2022-10-25 ENCOUNTER — APPOINTMENT (OUTPATIENT)
Dept: PULMONOLOGY | Facility: CLINIC | Age: 42
End: 2022-10-25

## 2022-10-26 ENCOUNTER — APPOINTMENT (OUTPATIENT)
Dept: RADIOLOGY | Facility: HOSPITAL | Age: 42
End: 2022-10-26

## 2022-10-26 ENCOUNTER — TRANSCRIPTION ENCOUNTER (OUTPATIENT)
Age: 42
End: 2022-10-26

## 2022-10-26 ENCOUNTER — OUTPATIENT (OUTPATIENT)
Dept: OUTPATIENT SERVICES | Facility: HOSPITAL | Age: 42
LOS: 1 days | End: 2022-10-26
Payer: COMMERCIAL

## 2022-10-26 DIAGNOSIS — Z98.890 OTHER SPECIFIED POSTPROCEDURAL STATES: Chronic | ICD-10-CM

## 2022-10-26 DIAGNOSIS — R06.02 SHORTNESS OF BREATH: ICD-10-CM

## 2022-10-26 PROCEDURE — 76000 FLUOROSCOPY <1 HR PHYS/QHP: CPT | Mod: 26

## 2022-10-26 PROCEDURE — 76000 FLUOROSCOPY <1 HR PHYS/QHP: CPT

## 2022-10-27 ENCOUNTER — NON-APPOINTMENT (OUTPATIENT)
Age: 42
End: 2022-10-27

## 2022-11-14 ENCOUNTER — APPOINTMENT (OUTPATIENT)
Dept: OTOLARYNGOLOGY | Facility: CLINIC | Age: 42
End: 2022-11-14

## 2022-11-14 VITALS
HEART RATE: 76 BPM | BODY MASS INDEX: 20.2 KG/M2 | SYSTOLIC BLOOD PRESSURE: 143 MMHG | DIASTOLIC BLOOD PRESSURE: 83 MMHG | HEIGHT: 63 IN | WEIGHT: 114 LBS

## 2022-11-14 DIAGNOSIS — C73 MALIGNANT NEOPLASM OF THYROID GLAND: ICD-10-CM

## 2022-11-14 PROCEDURE — 99203 OFFICE O/P NEW LOW 30 MIN: CPT | Mod: 25

## 2022-11-14 PROCEDURE — 31575 DIAGNOSTIC LARYNGOSCOPY: CPT

## 2022-11-14 NOTE — HISTORY OF PRESENT ILLNESS
[de-identified] : 41 y/o F Referred by Dr. Cooper.  Pt. 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 Now following at [facility].  Notes has residual cells.   She had radioactive Iodine x 1.  \par Pt. notes following the Thyroidectomy she had dysphonia and was told her Vocal cords were injured, unsure what cord. Also notes Dysphagia.  Pos sensation of coughing and choking when eating, difficulty with solids and need to drink liquids in small sips.  Does get SOB with talking.  Has not had a swallow study.

## 2022-11-14 NOTE — END OF VISIT
[FreeTextEntry3] : I personally saw and examined Ms. DEREK BAKER in detail this visit today. I personally reviewed the HPI, PMH, FH, SH, ROS and medications/allergies. I have spoken to MARIANO Love regarding the history and have personally determined the assessment and plan of care, and documented this myself. I was present and participated in all key portions of the encounter and all procedures noted above. I have made changes in the body of the note where appropriate.\par \par Attesting Faculty: See Attending Signature Below

## 2022-11-14 NOTE — PROCEDURE
[de-identified] : Flexible scope #215 used. Passed through nasal passage and nasopharynx/oropharynx/hypopharynx clear. Supraglottis normal. Glottis with fully mobile Left vocal cord with Right cord paralysis in paramedian position without lesions or masses. Visualized subglottis clear. Postcricoid area without erythema or edema. No pooling of secretions.\par \par

## 2022-11-14 NOTE — ASSESSMENT
[FreeTextEntry1] : Dysphonia and Dysphagia following thyroid surgery for PTC; sounds as thought right cord was sacrificed. On exam with right vocal cord paralysis noted:\par - Will get MBS and call with results\par - F/U with Dr. Chatterjee for further eval and treatment

## 2022-11-14 NOTE — CONSULT LETTER
[Dear  ___] : Dear  [unfilled], [Consult Letter:] : I had the pleasure of evaluating your patient, [unfilled]. [Please see my note below.] : Please see my note below. [Consult Closing:] : Thank you very much for allowing me to participate in the care of this patient.  If you have any questions, please do not hesitate to contact me. [Sincerely,] : Sincerely, [DrDominick  ___] : Dr. HINOJOSA [FreeTextEntry3] : Asia Dobbins MD\par Otolaryngology and Cranial Base Surgery\par Attending Physician - Department of Otolaryngology and Head & Neck Surgery\par [facility]\par  - [employer] [facility]\par Office: [phone]\par Fax: [phone]\par

## 2022-12-01 ENCOUNTER — RESULT REVIEW (OUTPATIENT)
Age: 42
End: 2022-12-01

## 2022-12-06 ENCOUNTER — APPOINTMENT (OUTPATIENT)
Dept: CARDIOLOGY | Facility: CLINIC | Age: 42
End: 2022-12-06

## 2022-12-06 VITALS
HEIGHT: 63 IN | SYSTOLIC BLOOD PRESSURE: 126 MMHG | DIASTOLIC BLOOD PRESSURE: 79 MMHG | TEMPERATURE: 98.4 F | HEART RATE: 74 BPM | OXYGEN SATURATION: 99 % | BODY MASS INDEX: 20.38 KG/M2 | WEIGHT: 115 LBS | RESPIRATION RATE: 16 BRPM

## 2022-12-06 PROCEDURE — 99213 OFFICE O/P EST LOW 20 MIN: CPT | Mod: 25

## 2022-12-06 PROCEDURE — 93015 CV STRESS TEST SUPVJ I&R: CPT

## 2022-12-06 NOTE — PHYSICAL EXAM
[Well Developed] : well developed [Well Nourished] : well nourished [No Acute Distress] : no acute distress [Normal Conjunctiva] : normal conjunctiva [Normal Venous Pressure] : normal venous pressure [No Carotid Bruit] : no carotid bruit [Normal S1, S2] : normal S1, S2 [No Rub] : no rub [No Gallop] : no gallop [5th Left ICS - MCL] : palpated at the 5th LICS in the midclavicular line [Normal] : normal [No Precordial Heave] : no precordial heave was noted [Normal Rate] : normal [Normal S1] : normal S1 [Normal S2] : normal S2 [I] : a grade 1 [No Pitting Edema] : no pitting edema present [2+] : left 2+ [No Abnormalities] : the abdominal aorta was not enlarged and no bruit was heard [Clear Lung Fields] : clear lung fields [Good Air Entry] : good air entry [No Respiratory Distress] : no respiratory distress  [Soft] : abdomen soft [Non Tender] : non-tender [No Masses/organomegaly] : no masses/organomegaly [Normal Bowel Sounds] : normal bowel sounds [Normal Gait] : normal gait [No Edema] : no edema [No Cyanosis] : no cyanosis [No Clubbing] : no clubbing [No Varicosities] : no varicosities [No Rash] : no rash [No Skin Lesions] : no skin lesions [Moves all extremities] : moves all extremities [No Focal Deficits] : no focal deficits [Normal Speech] : normal speech [Alert and Oriented] : alert and oriented [Normal memory] : normal memory [S3] : no S3 [S4] : no S4 [Right Carotid Bruit] : no bruit heard over the right carotid [Left Carotid Bruit] : no bruit heard over the left carotid [Right Femoral Bruit] : no bruit heard over the right femoral artery [Left Femoral Bruit] : no bruit heard over the left femoral artery

## 2022-12-06 NOTE — REASON FOR VISIT
[CV Risk Factors and Non-Cardiac Disease] : CV risk factors and non-cardiac disease [FreeTextEntry3] : Dr. Tanner [FreeTextEntry1] : This is a 42-year-old female with past medical history significant for thyroid cancer, status post partial thyroidectomy 12 years ago, childhood asthma, status post COVID-19 infection June 2022 and received monoclonal antibody infusion, status post cholecystectomy, status post removal of fibroid, who comes in for cardiac consultation. Blood work on 10/18/22 demonstrates glucose 106, total cholesterol 205, HDL 56, \par \par PMHx: \par 42 year old female with history of thyroid cancer and COVID19 infection (June 2022) presents for cardiac consult. Patient has been experiencing episodes of shortness of breath and chest pain, especially worse when running. She has been an avid runner and has not been able to run the same since having COVID. She is still under care of [place] doctors for thyroid cancer which was diagnosed 12 years ago - she got surgery 12 years ago but they were unable to remove the full thyroid. She received monoclonal antibodies due to her cancer history.\par \par She has seen  and he states everything is good with her lungs.

## 2022-12-06 NOTE — DISCUSSION/SUMMARY
[FreeTextEntry1] : This is a 42-year-old female with past medical history significant for thyroid cancer, status post partial thyroidectomy 12 years ago, childhood asthma, status post COVID-19 infection June 2022 and received monoclonal antibody infusion, status post cholecystectomy, status post removal of fibroid, who comes in for cardiac follow-up evaluation.\par She denies palpitations, dizziness, syncope, or chest pressure.  She has no history of rheumatic fever.  She does not drink excessive caffeine or alcohol.\par She has no known cardiac risk factors.\par The patient had normal exercise stress test December 6, 2022.  She was able to talk through the entire test comfortably.\par Blood work done October 17, 2022 demonstrated a cholesterol of 205, HDL of 56, triglycerides of 75, LDL direct of 136, non-HDL cholesterol 150 mg/dL.\par Lifestyle modification was reinforced.  The patient will follow up with her pulmonologist and ENT specialist regarding her shortness of breath.\par Electrocardiogram done October 17, 2022 demonstrated normal sinus rhythm rate of 82 bpm and is otherwise unremarkable\par The patient has been complaining of dyspnea on exertion atypical chest pain since the end of June 2022.\par The patient also complains of reproducible chest pain above the sternal area with running.  It is relieved with rubbing that area.  She has no symptoms at rest and no symptoms with walking.  She also reports to getting short of breath in cold weather when she is running.\par She recently started on pulmonary inhalers.\par She will schedule an echo Doppler examination to rule out pericardial fluid, myocardial inflammation, evaluate her left ventricular function, chamber size, rule out mitral valve prolapse and rule out hypertrophy.\par She will also schedule an exercise stress test to evaluate her symptoms when walking/running.\par I feel that her shortness of breath is likely secondary to reactive airway disease.\par Further recommendations were made after above-noted diagnostic tests are completed.\par The patient is encouraged to maintain good hydration.\par She will be starting her pulmonary inhalers and recommendations as per pulmonary medicine.  I will see if this improves her overall symptoms.\par Thank you for allowing to participate in care of your patient.  Please do not hesitate to call if you have any further questions.\par \par

## 2022-12-12 RX ORDER — DIPHENOXYLATE HYDROCHLORIDE AND ATROPINE SULFATE 2.5; .025 MG/1; MG/1
2.5-0.025 TABLET ORAL 3 TIMES DAILY
Qty: 90 | Refills: 4 | Status: COMPLETED | COMMUNITY
Start: 2022-08-29 | End: 2022-12-12

## 2022-12-20 ENCOUNTER — APPOINTMENT (OUTPATIENT)
Dept: SPEECH THERAPY | Facility: HOSPITAL | Age: 42
End: 2022-12-20

## 2022-12-20 ENCOUNTER — APPOINTMENT (OUTPATIENT)
Dept: RADIOLOGY | Facility: HOSPITAL | Age: 42
End: 2022-12-20

## 2022-12-20 ENCOUNTER — OUTPATIENT (OUTPATIENT)
Dept: OUTPATIENT SERVICES | Facility: HOSPITAL | Age: 42
LOS: 1 days | Discharge: HOME | End: 2022-12-20

## 2022-12-20 ENCOUNTER — NON-APPOINTMENT (OUTPATIENT)
Age: 42
End: 2022-12-20

## 2022-12-20 ENCOUNTER — OUTPATIENT (OUTPATIENT)
Dept: OUTPATIENT SERVICES | Facility: HOSPITAL | Age: 42
LOS: 1 days | End: 2022-12-20

## 2022-12-20 DIAGNOSIS — Z98.890 OTHER SPECIFIED POSTPROCEDURAL STATES: Chronic | ICD-10-CM

## 2022-12-20 DIAGNOSIS — R13.12 DYSPHAGIA, OROPHARYNGEAL PHASE: ICD-10-CM

## 2022-12-20 PROCEDURE — 74230 X-RAY XM SWLNG FUNCJ C+: CPT | Mod: 26

## 2022-12-20 NOTE — ASSESSMENT
[FreeTextEntry1] : Patient presents with Functional Oral and Pharyngeal Stage swallowing mechanism. The Oral Stage is characterized by adequate oral containment, adequate chewing for solid, adequate bolus manipulation, adequate tongue motion with adequate anterior to posterior transfer of the bolus for puree and solids. There is adequate oral clearance post swallow.  The Pharyngeal Stage is characterized by adequate initiation of the pharyngeal swallow, adequate laryngeal elevation, adequate tongue base retraction and adequate pharyngeal constriction. There is adequate pharyngeal clearance post swallow for puree/solids.  There was No Aspiration observed before, during or after the swallow for puree, solids and thin liquids. \par \par \par RESULTS: \par No Aspiration observed before, during or after the swallow. \par \par Of Note: An Esophageal Screen was performed. Patient was given a Barium Tablet with a cup of water. The Tablet was observed to course through the pharynx/esophagus without hold up. This cannot be considered as a full complete evaluation of the esophagus. \par \par

## 2022-12-20 NOTE — PLAN
[FreeTextEntry2] : \par 1.) Continue current diet regimen of Regular and Thin Liquids\par 2.) Aspiration Precautions\par 3.) Reflux Precautions\par 4.) Maintain Good Oral Hygiene Care\par 5.) Follow up with Physician\par 7.) Consider GI Consultation to assess and rule out an esophageal dysphagia component given patient offers "stuck" sensation at MD"s discretion\par \par SLP provided Patient education regarding preliminary results. Patient verbalized understanding and will follow up with Physician. \par

## 2022-12-20 NOTE — HISTORY OF PRESENT ILLNESS
[FreeTextEntry1] : Patient arrived to Radiology for an OutPatient Modified Barium Swallow Study. Patient is a 41 y/o female with PMH as per EMR:\par \par \par Active Problems\par Allergic rhinitis (477.9) (J30.9)\par Atypical chest pain (786.59) (R07.89)\par Breast mass in female (611.72) (N63.0)\par Dyspnea on exertion (786.09) (R06.09)\par Fibroadenoma (217) (D24.9)\par Flank pain (789.09) (R10.9)\par History of COVID-19 (V12.09) (Z86.16)\par Hypothyroidism (244.9) (E03.9)\par Irritable bowel syndrome with diarrhea (564.1) (K58.0)\par Long COVID (139.8) (U09.9)\par LPRD (laryngopharyngeal reflux disease) (478.79) (K21.9)\par Murmur (785.2) (R01.1)\par Paralysis of vocal cords (478.30) (J38.00)\par 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,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) (R06.02,U09.9)\par RAD (reactive airway disease) (493.90) (J45.909)\par Right elbow pain (719.42) (M25.521)\par Screening for STD (sexually transmitted disease) (V74.5) (Z11.3)\par Snoring (786.09) (R06.83)\par SOB (shortness of breath) (786.05) (R06.02)\par \par Past Medical History\par History of asthma (V12.69) (Z87.09)\par History of hypothyroidism (V12.29) (Z86.39)\par History of IBS (V12.79) (Z87.19)\par History of malignant neoplasm of thyroid (V10.87) (Z85.850)\par \par Surgical History\par History of Breast biopsy\par History of  Section\par History of Gallbladder surgery\par History of Parathyroidectomy\par History of Thyroid surgery\par History of Uterine myomectomy\par \par \par \par ENT Note 2022 - Dysphonia and Dysphagia following thyroid surgery for PTC; sounds as thought right cord was sacrificed. On exam with right vocal cord paralysis noted:\par - Will get MBS and call with results\par - F/U with Dr. Chatterjee for further eval and treatment. \par \par \par \par Patient offers c/o "small bits of food feels like it get stuck" "I can feel the food go down if I take a big bite" "very dry textures such as bread"  Patient reports the difficulty has been chronic the past 12 years since her surgery of Thyroidectomy.   Patient also reports having one paralyzed vocal cord.  Patient reports that she takes small bites and drinks liquids for the "stuck" sensation.    Patient reports no current/repeated pneumonia.  This Modified Barium Swallow Study is to objectively assess the Physiology of the Oral and Pharyngeal Stage swallowing mechanism for treatment plan for least restrictive diet.\par \par \par Referring MD: Dr. Tucker Dobbins\par  \par \par \par

## 2022-12-21 ENCOUNTER — NON-APPOINTMENT (OUTPATIENT)
Age: 42
End: 2022-12-21

## 2022-12-22 DIAGNOSIS — R13.10 DYSPHAGIA, UNSPECIFIED: ICD-10-CM

## 2022-12-29 ENCOUNTER — APPOINTMENT (OUTPATIENT)
Dept: CARDIOLOGY | Facility: CLINIC | Age: 42
End: 2022-12-29
Payer: COMMERCIAL

## 2022-12-29 DIAGNOSIS — R07.89 OTHER CHEST PAIN: ICD-10-CM

## 2022-12-29 PROCEDURE — 93306 TTE W/DOPPLER COMPLETE: CPT

## 2023-01-06 ENCOUNTER — NON-APPOINTMENT (OUTPATIENT)
Age: 43
End: 2023-01-06

## 2023-01-06 ENCOUNTER — APPOINTMENT (OUTPATIENT)
Dept: PULMONOLOGY | Facility: CLINIC | Age: 43
End: 2023-01-06
Payer: COMMERCIAL

## 2023-01-06 VITALS
HEIGHT: 63 IN | HEART RATE: 75 BPM | TEMPERATURE: 97.2 F | SYSTOLIC BLOOD PRESSURE: 116 MMHG | DIASTOLIC BLOOD PRESSURE: 70 MMHG | WEIGHT: 115 LBS | RESPIRATION RATE: 16 BRPM | BODY MASS INDEX: 20.38 KG/M2 | OXYGEN SATURATION: 97 %

## 2023-01-06 PROCEDURE — 95012 NITRIC OXIDE EXP GAS DETER: CPT

## 2023-01-06 PROCEDURE — 99214 OFFICE O/P EST MOD 30 MIN: CPT | Mod: 25

## 2023-01-06 PROCEDURE — 94010 BREATHING CAPACITY TEST: CPT

## 2023-01-06 NOTE — ADDENDUM
[FreeTextEntry1] : Documented by Gerardo Ocasio acting as a scribe for Dr. Dago Cooper on (01/06/2023).\par \par All medical record entries made by the Scribe were at my, Dr. Dago Cooper's, direction and personally dictated by me on (01/06/2023). I have reviewed the chart and agree that the record accurately reflects my personal performance of the history, physical exam, assessment and plan. I have personally directed, reviewed, and agree with the discharge instructions.

## 2023-01-06 NOTE — PROCEDURE
[FreeTextEntry1] : PFT reveals normal flows, with an FEV1 of  2.82L, which is 102% of predicted, with a normal flow volume loop \par \par FENO was 13; a normal value being less than 25\par Fractional exhaled nitric oxide (FENO) is regarded as a simple, noninvasive method for assessing eosinophilic airway inflammation. Produced by a variety of cells within the lung, nitric oxide (NO) concentrations are generally low in healthy individuals. However, high concentrations of NO appear to be involved in nonspecific host defense mechanisms and chronic inflammatory diseases such as asthma. The American Thoracic Society (ATS) therefore has recommended using FENO to aid in the diagnosis and monitoring of eosinophilic airway inflammation and asthma, and for identifying steroid responsive individuals whose chronic respiratory symptoms may be caused by airway inflammation. \par \par Fluoroscopy of diaphragm (10/26/2022) revealed no findings suggestive of diaphragmatic paralysis.\par \par ECHO (12/29/2022) revealed 1. Normal left ventricular size, wall thickness, wall motion, and systolic function.\par 2. Trace mitral valve regurgitation.\par 3. Mild tricuspid regurgitation.\par 4. Left ventricular ejection fraction is 66 % by visual interpretation, and visually estimated at 65 to 70%.\par 5. Thinning of the inter-atrial septum.\par

## 2023-01-06 NOTE — HISTORY OF PRESENT ILLNESS
[TextBox_4] : Ms. BAKER is a 42 year old female with a history of fibroid surgery, IBS, hypothyroid, malabsorption, thyroid surgery with parathyroids injury (2010), covid-19 infection, benign breast tumor, post-covid sob,  presenting to the office today for f/p pulmonary evaluation. Her chief complaint is vocal cord dysfunction/ paralysis, metastatic thyroid, sob \par - she notes feeling well in general \par - she notes exercising\par - she denies any visual issues\par - she notes weight is stable\par - she notes being exhausted\par - she notes not sleeping well \par - she notes difficulty falling asleep\par - she notes muscle cramps and spasm\par - \par \par \par \par - She  denies any headaches, nausea, vomiting, fever, chills, sweats, chest pains, chest pressure, diarrhea, constipation, dysphagia, myalgia, dizziness, leg swelling, leg pain, itchy eyes, itchy ears, heartburn, reflux, or sour taste in the mouth.

## 2023-01-06 NOTE — ASSESSMENT
[FreeTextEntry1] : Ms. BAKER is a 42 year old female with a history of fibroid surgery, IBS, hypothyroid, malabsorption, thyroid surgery with parathyroids injury (2010), covid-19 infection, benign breast tumor, post-covid sob,  presenting to the office today for initial pulmonary evaluation. Her chief complaint is vocal cord dysfunction/ paralysis, metastatic thyroid, sob, LPR, allergies, asthma related to post covid-19 airways- cramps \par \par Her shortness of breath is multifactorial due to:\par -poor mechanics of breathing \par -out of shape / overweight\par -pulmonary disease\par    -Asthma \par -cardiac disease (?paracardial inflammation)\par \par Problem 1: Vocal Cord Dysfunction\par -Recommended Wim Hof and Buteyko breathing techniques \par -Consult ENT (Dr. Handy Moncada) \par \par Problem 2: Post- Covid reactive airways/ asthma \par - Add Breztri 2 BID \par -Recommend Aerochamber \par Asthma is  believed to be caused by inherited (genetic) and environmental factor, but its exact cause is unknown. Asthma may be triggered by allergens, lung infections, or irritants in the air. Asthma triggers are different for each person \par Inhaler technique reviewed as well as oral hygiene techniques reviewed with patient. Avoidance of cold air, extremes of temperature, rescue inhaler should be used before exercise. Order of medication reviewed with patient. Recommended use of a cool mist humidifier in the bedroom. 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 \par \par Problem 5:  ?LPR \par - add Protonix 40 mg qAM before breakfast \par -continue Pepcid 40mg QHS \par -Rule of 2s: avoid eating too much, eating too late, eating too spicy, eating two hours before bed.\par -Things to avoid including overeating, spicy foods, tight clothing, eating within three hours of bed, this list is not all inclusive. \par -For treatment of reflux, possible options discussed including diet control, H2 blockers, PPIs, as well as coating motility agents discussed as treatment options. Timing of meals and proximity of last meal to sleep were discussed. If symptoms persist, a formal gastrointestinal evaluation is needed. 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  Cardiovascular disease risk includes arrhythmias, atrial fibrillation, hypertension, coronary artery disease, and stroke. Metabolic disorders include diabetes type 2, non-alcoholic fatty liver disease. Mood disorder especially depression; and cognitive decline especially in the elderly. Associations with  chronic reflux/Farmer’s esophagus some but not all inclusive. \par -Reasons  include arousal consistent with hypopnea; respiratory events most prominent in REM sleep or supine position; therefore sleep staging and body position are important for accurate diagnosis and estimation of AHI. \par \par problem : cardiac disease\par -s/p CRP \par -s/p Sed rate \par - ECHO- WNL\par -recommended to continue to follow up with Cardiologist (Dr. Gavin Tellez) \par \par problem : poor breathing mechanics\par -Proper breathing techniques were reviewed with an emphasis of exhalation. Patient instructed to breath in for 1 second and out for four seconds. Patient was encouraged to not talk while walking. \par \par problem : out of shape \par -Exercise, and diet control were discussed and are highly encouraged. Treatment options were given such as, aqua therapy, and contacting a nutritionist. Recommended to use the elliptical, stationary bike, less use of treadmill. Mindful eating was explained to the patient Obesity is associated with worsening asthma, shortness of breath, and potential for cardiac disease, diabetes, and other underlying medical conditions\par \par problem : health maintenance \par -Recommended ENT (Tucker Hernandez) \par -recommended yearly flu shot after October 15 2022\par -recommended strep pneumonia vaccines: Prevnar-13 vaccine, followed by Pneumo vaccine 23 one year following after 65\par -recommended early intervention for Upper Respiratory Infections (URIs)\par -recommended regular osteoporosis evaluations\par -recommended early dermatological evaluations\par -recommended after the age of 50 to the age of 70, colonoscopy every 5 years\par \par F/U in 6-8 weeks.\par She is encouraged to call with any changes, concerns, or questions

## 2023-01-10 ENCOUNTER — RX RENEWAL (OUTPATIENT)
Age: 43
End: 2023-01-10

## 2023-01-14 PROBLEM — R07.89 ATYPICAL CHEST PAIN: Status: ACTIVE | Noted: 2022-10-17

## 2023-02-04 ENCOUNTER — TRANSCRIPTION ENCOUNTER (OUTPATIENT)
Age: 43
End: 2023-02-04

## 2023-02-04 ENCOUNTER — RX RENEWAL (OUTPATIENT)
Age: 43
End: 2023-02-04

## 2023-02-15 ENCOUNTER — NON-APPOINTMENT (OUTPATIENT)
Age: 43
End: 2023-02-15

## 2023-02-15 ENCOUNTER — APPOINTMENT (OUTPATIENT)
Dept: CARDIOLOGY | Facility: CLINIC | Age: 43
End: 2023-02-15
Payer: COMMERCIAL

## 2023-02-15 ENCOUNTER — LABORATORY RESULT (OUTPATIENT)
Age: 43
End: 2023-02-15

## 2023-02-15 VITALS
HEIGHT: 63 IN | HEART RATE: 69 BPM | TEMPERATURE: 98.4 F | BODY MASS INDEX: 20.02 KG/M2 | DIASTOLIC BLOOD PRESSURE: 72 MMHG | RESPIRATION RATE: 16 BRPM | SYSTOLIC BLOOD PRESSURE: 118 MMHG | OXYGEN SATURATION: 98 % | WEIGHT: 113 LBS

## 2023-02-15 PROCEDURE — 99214 OFFICE O/P EST MOD 30 MIN: CPT | Mod: 25

## 2023-02-15 PROCEDURE — 93000 ELECTROCARDIOGRAM COMPLETE: CPT

## 2023-02-15 NOTE — ASSESSMENT
[FreeTextEntry1] : This is a 42 year year old female here today for follow up cardiac evaluation. \par She has a past medical history significant for  thyroid cancer, status post partial thyroidectomy 12 years ago, childhood asthma, status post COVID-19 infection June 2022 and received monoclonal antibody infusion, status post cholecystectomy, status post removal of fibroid.\par \par CHIEF COMPLAINT:\par Today she is feeling generally well and does not have any complaints at this time. \par \par She denies fever, chills, weight loss, malaise, rash, alteration bowel habits, weakness, abdominal  pain, bloating, changes in urination, visual disturbances, chest pain, headaches, dizziness, heart palpitations, recent episodes of syncope or falls at this time.\par  \par She has no known cardiac risk factors.\par \par PMHx: \par 42 year old female with history of thyroid cancer and COVID19 infection (June 2022) presents for cardiac consult. Patient has been experiencing episodes of shortness of breath and chest pain, especially worse when running. She has been an avid runner and has not been able to run the same since having COVID. She is still under care of [facility] for thyroid cancer which was diagnosed 12 years ago - she got surgery 12 years ago but they were unable to remove the full thyroid. She received monoclonal antibodies due to her cancer history.\par \par BLOOD PRESSURE:\par -BP is well controlled in today's visit.\par \par -I have discussed the importance of maintaining good BP control and reviewed the newest guidelines with the patient while re-enforcing dietary sodium restrictions to no more than 2-3 g daily, DASH diet, life style modifications as well as the goal of maintaining ideal body weight with the patient today. I have advised the patient to avoid the use of over-the-counter medications/ supplements especially NSAIDS.\par \par BLOOD WORK:\par -New blood work was done 02/15/2023 to evaluate lipid profile, CBC, BMP, hepatic function, A1C and TSH.\par \par CHOLESTEROL CONTROL:\par -Patient will continue the advised  TLC diet and to continue follow-up for treatment of hyperlipidemia and repeat blood testing with diet and exercise. I have discussed different exercises and the importance of maintenance of optimal body weight. The importance of staying within guidelines and recommendations was stressed to the patient today and they acknowledged that they understand this to me verbally.\par \par TESTING/REPORTS:\par -EKG done Feb 15, 2023 which demonstrated regular sinus rhythm with nonspecific ST-T wave changes, BPM of 69.\par \par -Echocardiogram done December 29, 2022 demonstrated mild tricuspid regurgitation with normal left ventricular systolic function ejection fraction 66%.\par \par -The patient had a normal exercise stress test on December 6, 2022\par \par PLAN:\par -She will continue with her current medications and will contact the office if she is having any complaints between now and their next follow up appointment.\par -She will follow-up with her pulmonologist routinely.\par \par I have discussed the plan of care with Ms. DEREK BAKER  and she  will follow up in 6 months. She is compliant with all of her medications.\par \par The patient understands that aerobic exercises must be increased to minutes 4 times/week and a detailed discussion of lifestyle modification was done today. \par The patient has a good understanding of the diagnosis, treatment plan and lifestyle modification. \par She will contact me at the office for any questions with their care or any changes in their health status.\par \par \par Pratibha DU

## 2023-02-15 NOTE — DISCUSSION/SUMMARY
[FreeTextEntry1] : Dr. Tellez-(PRIOR VISIT and PMH WITH Dr. Tellez): \par This is a 42-year-old female with past medical history significant for thyroid cancer, status post partial thyroidectomy 12 years ago, childhood asthma, status post COVID-19 infection June 2022 and received monoclonal antibody infusion, status post cholecystectomy, status post removal of fibroid, who comes in for cardiac follow-up evaluation.\par \par She denies palpitations, dizziness, syncope, or chest pressure.  She has no history of rheumatic fever.  She does not drink excessive caffeine or alcohol.\par \par She has no known cardiac risk factors.\par \par The patient had normal exercise stress test December 6, 2022.  She was able to talk through the entire test comfortably.\par \par Blood work done October 17, 2022 demonstrated a cholesterol of 205, HDL of 56, triglycerides of 75, LDL direct of 136, non-HDL cholesterol 150 mg/dL.\par \par Lifestyle modification was reinforced.  \par \par The patient will follow up with her pulmonologist and ENT specialist regarding her shortness of breath.\par \par Electrocardiogram done October 17, 2022 demonstrated normal sinus rhythm rate of 82 bpm and is otherwise unremarkable\par \par The patient has been complaining of dyspnea on exertion atypical chest pain since the end of June 2022.\par The patient also complains of reproducible chest pain above the sternal area with running.  It is relieved with rubbing that area.  She has no symptoms at rest and no symptoms with walking.  She also reports to getting short of breath in cold weather when she is running.\par \par She recently started on pulmonary inhalers.\par \par She will also schedule an exercise stress test to evaluate her symptoms when walking/running.\par I feel that her shortness of breath is likely secondary to reactive airway disease.\par \par Further recommendations were made after above-noted diagnostic tests are completed.\par The patient is encouraged to maintain good hydration.\par She will be starting her pulmonary inhalers and recommendations as per pulmonary medicine.  I will see if this improves her overall symptoms.\par Thank you for allowing to participate in care of your patient.  Please do not hesitate to call if you have any further questions.\par \par

## 2023-02-15 NOTE — REASON FOR VISIT
[CV Risk Factors and Non-Cardiac Disease] : CV risk factors and non-cardiac disease [FreeTextEntry3] : Dr. Tanner [FreeTextEntry1] : This is a 42-year-old female with past medical history significant for thyroid cancer, status post partial thyroidectomy 12 years ago, childhood asthma, status post COVID-19 infection June 2022 and received monoclonal antibody infusion, status post cholecystectomy, status post removal of fibroid, who comes in for cardiac consultation. Blood work on 10/18/22 demonstrates glucose 106, total cholesterol 205, HDL 56, \par \par PMHx: \par 42 year old female with history of thyroid cancer and COVID19 infection (June 2022) presents for cardiac consult. Patient has been experiencing episodes of shortness of breath and chest pain, especially worse when running. She has been an avid runner and has not been able to run the same since having COVID. She is still under care of [facility] for thyroid cancer which was diagnosed 12 years ago - she got surgery 12 years ago but they were unable to remove the full thyroid. She received monoclonal antibodies due to her cancer history.\par

## 2023-02-15 NOTE — PHYSICAL EXAM
[Well Developed] : well developed [Well Nourished] : well nourished [No Acute Distress] : no acute distress [Normal Conjunctiva] : normal conjunctiva [Normal Venous Pressure] : normal venous pressure Patient seen and examined. Feels much better after transfusion, no complaints. H&H sherlyn well. Distention better today, patient passing flatus, no nausea. Would give regular diet, if tolerates, clear for discharge today from gyn perspective    Sonu Weston MD [No Carotid Bruit] : no carotid bruit [Normal S1, S2] : normal S1, S2 [No Rub] : no rub [No Gallop] : no gallop [5th Left ICS - MCL] : palpated at the 5th LICS in the midclavicular line [Normal] : normal [No Precordial Heave] : no precordial heave was noted [Normal Rate] : normal [Normal S1] : normal S1 [Normal S2] : normal S2 [I] : a grade 1 [No Pitting Edema] : no pitting edema present [2+] : left 2+ [No Abnormalities] : the abdominal aorta was not enlarged and no bruit was heard [Clear Lung Fields] : clear lung fields [Good Air Entry] : good air entry [No Respiratory Distress] : no respiratory distress  [Soft] : abdomen soft [Non Tender] : non-tender [No Masses/organomegaly] : no masses/organomegaly [Normal Bowel Sounds] : normal bowel sounds [Normal Gait] : normal gait [No Edema] : no edema [No Cyanosis] : no cyanosis [No Clubbing] : no clubbing [No Varicosities] : no varicosities [No Rash] : no rash [No Skin Lesions] : no skin lesions [Moves all extremities] : moves all extremities [No Focal Deficits] : no focal deficits [Normal Speech] : normal speech [Alert and Oriented] : alert and oriented [Normal memory] : normal memory [S3] : no S3 [S4] : no S4 [Right Carotid Bruit] : no bruit heard over the right carotid [Left Carotid Bruit] : no bruit heard over the left carotid [Right Femoral Bruit] : no bruit heard over the right femoral artery [Left Femoral Bruit] : no bruit heard over the left femoral artery

## 2023-02-16 ENCOUNTER — TRANSCRIPTION ENCOUNTER (OUTPATIENT)
Age: 43
End: 2023-02-16

## 2023-02-20 ENCOUNTER — RX RENEWAL (OUTPATIENT)
Age: 43
End: 2023-02-20

## 2023-03-15 ENCOUNTER — APPOINTMENT (OUTPATIENT)
Dept: OTOLARYNGOLOGY | Facility: CLINIC | Age: 43
End: 2023-03-15
Payer: COMMERCIAL

## 2023-03-15 VITALS
WEIGHT: 115 LBS | HEIGHT: 63 IN | OXYGEN SATURATION: 99 % | HEART RATE: 83 BPM | DIASTOLIC BLOOD PRESSURE: 82 MMHG | SYSTOLIC BLOOD PRESSURE: 136 MMHG | BODY MASS INDEX: 20.38 KG/M2

## 2023-03-15 PROCEDURE — 31579 LARYNGOSCOPY TELESCOPIC: CPT

## 2023-03-15 PROCEDURE — 99214 OFFICE O/P EST MOD 30 MIN: CPT | Mod: 25

## 2023-03-15 NOTE — HISTORY OF PRESENT ILLNESS
[de-identified] : 42yF referred by Dr. Dobbins for right vocal card paralysis. 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 Now following at [facility]. Notes has residual cells. She had radioactive Iodine x 1. \par Voice is dysphonic, noticed voice change following thyroidectomy. \par Denies hemoptysis or epistaxis \par Some SOB when speaking and eating. Saw Dr. Cooper previously, lungs are fine as per pt. Was also referred to cardio, had a stress and echo, everything was normal as per pt. \par Some dysphagia but denies choking if drinks slow. Feels food is often stuck in her throat, has to drink water all the times. \par MBS done 12/20/22. Impression: No evidence of penetration or aspiration with the various tested consistencies. \par Some nasal congestion, Had a allergy test done, allergic to dogs, using a nasal spray \par On pantoprazole and famotidine \par Patient denies otalgia, otorrhea, ear infections, hearing loss, tinnitus, dizziness, vertigo, headaches related to hearing.\par No recent infection or fever.  \par \par \par

## 2023-03-15 NOTE — REASON FOR VISIT
[Initial Consultation] : an initial consultation for [FreeTextEntry2] : referred by Dr Dobbins; vocal cord obstruction

## 2023-03-15 NOTE — CONSULT LETTER
[Dear  ___] : Dear  [unfilled], [Consult Letter:] : I had the pleasure of evaluating your patient, [unfilled]. [Please see my note below.] : Please see my note below. [Consult Closing:] : Thank you very much for allowing me to participate in the care of this patient.  If you have any questions, please do not hesitate to contact me. [Sincerely,] : Sincerely, [FreeTextEntry2] : Dear Dr. Tanner [FreeTextEntry3] :  Tj Chatterjee MD, PhD \par Chief, Division of Laryngology \par Department of Otolaryngology \par [facility] \par Pediatric Otolaryngology, [facility] \par  of Otolaryngology \par [facility]

## 2023-04-03 ENCOUNTER — TRANSCRIPTION ENCOUNTER (OUTPATIENT)
Age: 43
End: 2023-04-03

## 2023-05-12 ENCOUNTER — APPOINTMENT (OUTPATIENT)
Dept: PULMONOLOGY | Facility: CLINIC | Age: 43
End: 2023-05-12
Payer: COMMERCIAL

## 2023-05-12 VITALS
SYSTOLIC BLOOD PRESSURE: 112 MMHG | OXYGEN SATURATION: 98 % | HEIGHT: 62 IN | TEMPERATURE: 97.7 F | WEIGHT: 114 LBS | BODY MASS INDEX: 20.98 KG/M2 | RESPIRATION RATE: 16 BRPM | HEART RATE: 75 BPM | DIASTOLIC BLOOD PRESSURE: 60 MMHG

## 2023-05-12 DIAGNOSIS — Z72.820 SLEEP DEPRIVATION: ICD-10-CM

## 2023-05-12 PROCEDURE — 94729 DIFFUSING CAPACITY: CPT

## 2023-05-12 PROCEDURE — 94010 BREATHING CAPACITY TEST: CPT

## 2023-05-12 PROCEDURE — 95012 NITRIC OXIDE EXP GAS DETER: CPT

## 2023-05-12 PROCEDURE — 99214 OFFICE O/P EST MOD 30 MIN: CPT | Mod: 25

## 2023-05-12 PROCEDURE — 94727 GAS DIL/WSHOT DETER LNG VOL: CPT

## 2023-05-12 PROCEDURE — ZZZZZ: CPT

## 2023-05-12 NOTE — PROCEDURE
[FreeTextEntry1] : Full PFT reveals normal flows; FEV1 was  3.13L which is 119% of predicted; normal lung volumes; normal diffusion at 23.2, which is 122% of predicted; normal flow volume loop.\par PFTs were performed to evaluate for SOB, asthma\par \par FENO was 17; a normal value being less than 25\par Fractional exhaled nitric oxide (FENO) is regarded as a simple, noninvasive method for assessing eosinophilic airway inflammation. Produced by a variety of cells within the lung, nitric oxide (NO) concentrations are generally low in healthy individuals. However, high concentrations of NO appear to be involved in nonspecific host defense mechanisms and chronic inflammatory diseases such as asthma. The American Thoracic Society (ATS) therefore has recommended using FENO to aid in the diagnosis and monitoring of eosinophilic airway inflammation and asthma, and for identifying steroid responsive individuals whose chronic respiratory symptoms may be caused by airway inflammation.\par \par

## 2023-05-12 NOTE — HISTORY OF PRESENT ILLNESS
[TextBox_4] : Ms. BAKER is a 43  year old female with a history of fibroid surgery, IBS, hypothyroid, malabsorption, thyroid surgery with parathyroids injury (2010), covid-19 infection, benign breast tumor, post-covid sob,  presenting to the office today for f/p pulmonary evaluation. Her chief complaint is\par \par -she notes good quality of sleep\par -she notes she no longer gets interrupted sleep\par -she notes energy levels are improved\par -she notes exercising (karate)\par -she notes IBARRA with light exercise (jump rope)\par -she notes bowels are regular\par -she notes dysphagia\par -she notes vocal cord issue (Dr. Chatterjee)\par -she notes discomfort in supine position\par -she notes vision is stable\par -she notes she desires more sleep\par -she notes her memory and concentration can be improved\par -\par \par \par -she denies any headaches, nausea, emesis, fever, chills, sweats, chest pain, chest pressure, coughing, wheezing, palpitations, constipation, diarrhea, vertigo, heartburn, reflux, itchy eyes, itchy ears, leg swelling, arthralgias, myalgias, or sour taste in the mouth.\par

## 2023-05-12 NOTE — ASSESSMENT
[FreeTextEntry1] : Ms. BAKER is a 43 year old female with a history of fibroid surgery, IBS, hypothyroid, malabsorption, thyroid surgery with parathyroids injury (2010), covid-19 infection, benign breast tumor, post-covid sob,  presenting to the office today for initial pulmonary evaluation. Her chief complaint is vocal cord dysfunction/ paralysis, metastatic thyroid, sob, LPR, allergies, asthma related to post covid-19 airways- cramps- awaiting vocal cord injections (6/2023 [place])\par \par Her shortness of breath is multifactorial due to:\par -poor mechanics of breathing \par -out of shape / overweight\par -pulmonary disease\par    -Asthma \par -cardiac disease (?pericardial inflammation)\par \par Problem 1: Vocal Cord Dysfunction\par -Recommended Wim Hof and Buteyko breathing techniques \par -s/p Consult ENT (Dr. Handy Moncada) - vocal cord injections 6/2023 pending\par -recommended speech therapy post-injection\par \par Problem 2: Post- Covid reactive airways/ asthma \par - Add Breztri 2 BID \par -Recommend Aerochamber \par Asthma is  believed to be caused by inherited (genetic) and environmental factor, but its exact cause is unknown. Asthma may be triggered by allergens, lung infections, or irritants in the air. Asthma triggers are different for each person \par Inhaler technique reviewed as well as oral hygiene techniques reviewed with patient. Avoidance of cold air, extremes of temperature, rescue inhaler should be used before exercise. Order of medication reviewed with patient. Recommended use of a cool mist humidifier in the bedroom. 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 \par \par Problem 5:  ?LPR \par - add Protonix 40 mg qAM before breakfast \par -continue Pepcid 40mg QHS \par -Rule of 2s: avoid eating too much, eating too late, eating too spicy, eating two hours before bed.\par -Things to avoid including overeating, spicy foods, tight clothing, eating within three hours of bed, this list is not all inclusive. \par -For treatment of reflux, possible options discussed including diet control, H2 blockers, PPIs, as well as coating motility agents discussed as treatment options. Timing of meals and proximity of last meal to sleep were discussed. If symptoms persist, a formal gastrointestinal evaluation is needed. 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  Cardiovascular disease risk includes arrhythmias, atrial fibrillation, hypertension, coronary artery disease, and stroke. Metabolic disorders include diabetes type 2, non-alcoholic fatty liver disease. Mood disorder especially depression; and cognitive decline especially in the elderly. Associations with  chronic reflux/Farmer’s esophagus some but not all inclusive. \par -Reasons  include arousal consistent with hypopnea; respiratory events most prominent in REM sleep or supine position; therefore sleep staging and body position are important for accurate diagnosis and estimation of AHI. \par \par problem 8: cardiac disease\par -s/p CRP \par -s/p Sed rate \par - ECHO- WNL\par -recommended to continue to follow up with Cardiologist (Dr. Gavin Tellez) \par \par problem 9: poor breathing mechanics\par -Proper breathing techniques were reviewed with an emphasis of exhalation. Patient instructed to breath in for 1 second and out for four seconds. Patient was encouraged to not talk while walking. \par \par problem 10: out of shape \par -Exercise, and diet control were discussed and are highly encouraged. Treatment options were given such as, aqua therapy, and contacting a nutritionist. Recommended to use the elliptical, stationary bike, less use of treadmill. Mindful eating was explained to the patient Obesity is associated with worsening asthma, shortness of breath, and potential for cardiac disease, diabetes, and other underlying medical conditions\par \par problem 11: health maintenance \par -Recommended ENT (Tucker Hernandez) \par -recommended yearly flu shot after October 15 2022\par -recommended strep pneumonia vaccines: Prevnar-13 vaccine, followed by Pneumo vaccine 23 one year following after 65\par -recommended early intervention for Upper Respiratory Infections (URIs)\par -recommended regular osteoporosis evaluations\par -recommended early dermatological evaluations\par -recommended after the age of 50 to the age of 70, colonoscopy every 5 years\par \par F/U in 3-4 months\par She is encouraged to call with any changes, concerns, or questions

## 2023-05-12 NOTE — ADDENDUM
[FreeTextEntry1] : Documented by KATALINA Barajas acting as a scribe for Dr. Dago Cooper on 05/12/2023 .\par \par All medical record entries made by the Scribe were at my, Dr. Dago Cooper's, direction and personally dictated by me on 05/12/2023. I have reviewed the chart and agree that the record accurately reflects my personal performance of the history, physical exam, assessment and plan. I have also personally directed, reviewed, and agree with the discharge instructions.\par

## 2023-06-06 ENCOUNTER — NON-APPOINTMENT (OUTPATIENT)
Age: 43
End: 2023-06-06

## 2023-06-07 ENCOUNTER — NON-APPOINTMENT (OUTPATIENT)
Age: 43
End: 2023-06-07

## 2023-06-13 ENCOUNTER — APPOINTMENT (OUTPATIENT)
Dept: OTOLARYNGOLOGY | Facility: CLINIC | Age: 43
End: 2023-06-13
Payer: COMMERCIAL

## 2023-06-13 VITALS
BODY MASS INDEX: 20.98 KG/M2 | HEART RATE: 55 BPM | HEIGHT: 62 IN | SYSTOLIC BLOOD PRESSURE: 124 MMHG | WEIGHT: 114 LBS | DIASTOLIC BLOOD PRESSURE: 73 MMHG

## 2023-06-13 PROCEDURE — 99213 OFFICE O/P EST LOW 20 MIN: CPT | Mod: 25

## 2023-06-13 PROCEDURE — 31574Z: CUSTOM

## 2023-06-13 RX ORDER — ASCORBIC ACID 500 MG
TABLET ORAL
Refills: 0 | Status: ACTIVE | COMMUNITY

## 2023-06-13 RX ORDER — COLD-HOT PACK
EACH MISCELLANEOUS
Refills: 0 | Status: ACTIVE | COMMUNITY

## 2023-06-13 RX ORDER — CHROMIUM 200 MCG
TABLET ORAL
Refills: 0 | Status: ACTIVE | COMMUNITY

## 2023-06-13 NOTE — CONSULT LETTER
[Dear  ___] : Dear  [unfilled], [Consult Letter:] : I had the pleasure of evaluating your patient, [unfilled]. [Please see my note below.] : Please see my note below. [Consult Closing:] : Thank you very much for allowing me to participate in the care of this patient.  If you have any questions, please do not hesitate to contact me. [Sincerely,] : Sincerely, [FreeTextEntry2] : Dear Dr. Tanner [FreeTextEntry3] :  Tj Chatterjee MD, PhD \par Chief, Division of Laryngology \par Department of Otolaryngology \par [facility] \par Pediatric Otolaryngology, [facility] \par  of Otolaryngology \par [facility]

## 2023-06-13 NOTE — HISTORY OF PRESENT ILLNESS
[de-identified] : 43yF referred follow up right vocal card paralysis. 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 Now following at [facility]. Notes has residual cells. She had radioactive Iodine x 1. \par States voice has been stable since last visit, very dry throat today.\par Denies hemoptysis. \par Reports difficulty breathing last week from the poor air quality, went to urgent care, prescribed nebulizer and albuterol as needed, used nebulizer with relief, but has not needed albuterol. \par Denies changes in swallowing, still cutting foods small and have to be careful with large pills. \par States continues famotidine 1x daily. \par Denies recent fevers or infections.

## 2023-06-13 NOTE — PHYSICAL EXAM
[Midline] : trachea located in midline position [Laryngoscopy Performed] : laryngoscopy was performed, see procedure section for findings [Normal] : no rashes [de-identified] : severely raspy and breathy

## 2023-06-16 ENCOUNTER — APPOINTMENT (OUTPATIENT)
Dept: CARDIOLOGY | Facility: CLINIC | Age: 43
End: 2023-06-16
Payer: COMMERCIAL

## 2023-06-16 VITALS
DIASTOLIC BLOOD PRESSURE: 60 MMHG | OXYGEN SATURATION: 99 % | SYSTOLIC BLOOD PRESSURE: 98 MMHG | RESPIRATION RATE: 16 BRPM | HEIGHT: 62 IN | BODY MASS INDEX: 20.98 KG/M2 | TEMPERATURE: 98.6 F | WEIGHT: 114 LBS | HEART RATE: 71 BPM

## 2023-06-16 PROCEDURE — 99214 OFFICE O/P EST MOD 30 MIN: CPT | Mod: 25

## 2023-06-16 NOTE — REASON FOR VISIT
[CV Risk Factors and Non-Cardiac Disease] : CV risk factors and non-cardiac disease [FreeTextEntry3] : Dr. Tanner [FreeTextEntry1] : This is a 42-year-old female with past medical history significant for thyroid cancer, status post partial thyroidectomy 12 years ago, childhood asthma, status post COVID-19 infection June 2022 and received monoclonal antibody infusion, status post cholecystectomy, status post removal of fibroid, who comes in for cardiac consultation. Blood work on 10/18/22 demonstrates glucose 106, total cholesterol 205, HDL 56, \par \par PMHx: \par 42 year old female with history of thyroid cancer and COVID19 infection (June 2022) presents for cardiac consult. Patient has been experiencing episodes of shortness of breath and chest pain, especially worse when running. She has been an avid runner and has not been able to run the same since having COVID. She is still under care of [place] doctors for thyroid cancer which was diagnosed 12 years ago - she got surgery 12 years ago but they were unable to remove the full thyroid. She received monoclonal antibodies due to her cancer history.\par

## 2023-06-16 NOTE — ASSESSMENT
[FreeTextEntry1] : This is a 43 year year old female here today for follow up cardiac evaluation. \par She has a past medical history significant for  thyroid cancer, status post partial thyroidectomy 12 years ago, childhood asthma, status post COVID-19 infection June 2022 and received monoclonal antibody infusion, status post cholecystectomy, status post removal of fibroid.\par \par CHIEF COMPLAINT:\par Today she is feeling generally well and does not have any complaints at this time. \par \par She denies fever, chills, weight loss, malaise, rash, alteration bowel habits, weakness, abdominal  pain, bloating, changes in urination, visual disturbances, chest pain, headaches, dizziness, heart palpitations, recent episodes of syncope or falls at this time.\par  \par She has no known cardiac risk factors.\par \par PMHx: \par 42 year old female with history of thyroid cancer and COVID19 infection (June 2022) presents for cardiac consult. Patient has been experiencing episodes of shortness of breath and chest pain, especially worse when running. She has been an avid runner and has not been able to run the same since having COVID. She is still under care of [place] doctors for thyroid cancer which was diagnosed 12 years ago - she got surgery 12 years ago but they were unable to remove the full thyroid. She received monoclonal antibodies due to her cancer history.\par \par BLOOD PRESSURE:\par -BP is well controlled in today's visit.\par \par BLOOD WORK:\par -New blood work was done 02/15/2023 which demonstrated labs WDL.\par \par TESTING/REPORTS:\par -EKG done Feb 15, 2023 which demonstrated regular sinus rhythm with nonspecific ST-T wave changes, BPM of 69.\par \par -Echocardiogram done December 29, 2022 demonstrated mild tricuspid regurgitation with normal left ventricular systolic function ejection fraction 66%.\par \par -The patient had a normal exercise stress test on December 6, 2022\par \par PLAN:\par -The patient is clinically stable from a cardiac standpoint on today's exam. \par -She will continue with her current medications and will contact the office if she is having any complaints between now and their next follow up appointment.\par -She will follow-up with her pulmonologist routinely.\par \par I have discussed the plan of care with Ms. DEREK BAKER  and she  will follow up in 6 months. She is compliant with all of her medications.\par \par The patient understands that aerobic exercises must be increased to minutes 4 times/week and a detailed discussion of lifestyle modification was done today. \par The patient has a good understanding of the diagnosis, treatment plan and lifestyle modification. \par She will contact me at the office for any questions with their care or any changes in their health status.\par \par \par Pratibha DU

## 2023-06-19 RX ORDER — INHALER, ASSIST DEVICES
SPACER (EA) MISCELLANEOUS
Qty: 1 | Refills: 2 | Status: COMPLETED | COMMUNITY
Start: 2022-10-03 | End: 2023-06-19

## 2023-07-01 ENCOUNTER — RX RENEWAL (OUTPATIENT)
Age: 43
End: 2023-07-01

## 2023-07-03 ENCOUNTER — TRANSCRIPTION ENCOUNTER (OUTPATIENT)
Age: 43
End: 2023-07-03

## 2023-07-25 ENCOUNTER — APPOINTMENT (OUTPATIENT)
Dept: OTOLARYNGOLOGY | Facility: CLINIC | Age: 43
End: 2023-07-25
Payer: COMMERCIAL

## 2023-07-25 PROCEDURE — 99214 OFFICE O/P EST MOD 30 MIN: CPT | Mod: 95

## 2023-07-25 NOTE — PHYSICAL EXAM
[Midline] : trachea located in midline position [Laryngoscopy Performed] : laryngoscopy was performed, see procedure section for findings [Normal] : no rashes [de-identified] : mildy raspy and breathy

## 2023-07-25 NOTE — HISTORY OF PRESENT ILLNESS
[Home] : at home, [unfilled] , at the time of the visit. [Other Location: e.g. Home (Enter Location, City,State)___] : at [unfilled] [Verbal consent obtained from patient] : the patient, [unfilled] [de-identified] : 43yF referred follow up right vocal card paralysis s/p injection laryngoplasty 6/13\par States voice has been stable since last visit, no SOB\par Denies hemoptysis. \par Reports difficulty breathing last week from the poor air quality, went to urgent care, prescribed nebulizer and albuterol as needed, used nebulizer with relief, but has not needed albuterol. \par Denies changes in swallowing, still cutting foods small and have to be careful with large pills. \par States continues famotidine 1x daily. \par Denies recent fevers or infections.

## 2023-08-08 ENCOUNTER — APPOINTMENT (OUTPATIENT)
Dept: OTOLARYNGOLOGY | Facility: CLINIC | Age: 43
End: 2023-08-08
Payer: COMMERCIAL

## 2023-08-08 PROCEDURE — 92524 BEHAVRAL QUALIT ANALYS VOICE: CPT | Mod: GN

## 2023-08-24 ENCOUNTER — APPOINTMENT (OUTPATIENT)
Dept: OTOLARYNGOLOGY | Facility: CLINIC | Age: 43
End: 2023-08-24
Payer: COMMERCIAL

## 2023-08-24 ENCOUNTER — APPOINTMENT (OUTPATIENT)
Dept: OTOLARYNGOLOGY | Facility: CLINIC | Age: 43
End: 2023-08-24

## 2023-08-24 PROCEDURE — 92507 TX SP LANG VOICE COMM INDIV: CPT | Mod: GN

## 2023-08-29 ENCOUNTER — APPOINTMENT (OUTPATIENT)
Dept: OTOLARYNGOLOGY | Facility: CLINIC | Age: 43
End: 2023-08-29
Payer: COMMERCIAL

## 2023-08-29 PROCEDURE — 92507 TX SP LANG VOICE COMM INDIV: CPT | Mod: GN

## 2023-09-05 ENCOUNTER — APPOINTMENT (OUTPATIENT)
Dept: SURGICAL ONCOLOGY | Facility: CLINIC | Age: 43
End: 2023-09-05
Payer: COMMERCIAL

## 2023-09-05 ENCOUNTER — NON-APPOINTMENT (OUTPATIENT)
Age: 43
End: 2023-09-05

## 2023-09-05 DIAGNOSIS — D24.9 BENIGN NEOPLASM OF UNSPECIFIED BREAST: ICD-10-CM

## 2023-09-05 PROCEDURE — 99213 OFFICE O/P EST LOW 20 MIN: CPT

## 2023-09-05 NOTE — PHYSICAL EXAM
[FreeTextEntry1] : SC was present during the exam  [de-identified] : Normal S1, S2. regular rate and rhythm.  [de-identified] : Complete breast exam performed in supine and upright positions. No palpable masses, tenderness, nipple discharge, inversion, deviation or enlarged axillary or supraclavicular lymph nodes bilaterally.  [de-identified] : Clear breath sounds bilaterally, normal respiratory effort.

## 2023-09-05 NOTE — ASSESSMENT
[FreeTextEntry1] : IMP: 43 year old female initially seen regarding right breast mass. She is s/p USGB of the right breast mass as well as an MRI-guided right breast biopsy 1/2022 with benign findings  PLAN:  Return in one year MMG/US now    All medical entries were at my, Dr. Justin Robles, direction. I have reviewed the chart and agree that the record accurately reflects my personal performance of the history, physical exam, assessment and plan. Our office Nurse Practitioner was present of the duration of the office visit.

## 2023-09-05 NOTE — HISTORY OF PRESENT ILLNESS
[de-identified] : Ms. DEREK BAKER is a 43 year old female who returns for follow up.  Past medical history: thyroid cancer at age 30, Asthma  Family history: breast cancer in maternal grandmother at age 70.   B/L mammo/sono 7/2021: Right breast 8:00 4cm from the nipple is a well-circumscribed benign-appearing hypoechoic nodule measuring 15 x 5 x 10mm. This finding appears increased in size possibly relating to difference in technique. Recommended for 6  months right breast US. BIRADS 3.   She had a follow up right breast ultrasound on 1/4/22.  There is no sonographic correlate for the area of nonmass enhancement noted on prior MRI.  MRI biopsy was previously advised.  Circumscribed mass at the 8:00 position of the right breast has increased in size, ultrasound-guided biopsy is recommended (BIRADS 4a).   She is s/p ultrasound-guided right breast biopsy on 1/18/22.  Pathology demonstrated a myxoid fibroadenoma, benign and concordant.  MRI-guided right breast biopsy was also performed with benign findings.   Bilateral mammogram/sonogram 7/2022- No evidence of malignancy BI-RADS 1  She has not had any recent imaging, new script given, and she will go sometime in the near future.    Today she is without any complaints. Denies palpable breast masses, nipple discharge, skin changes, inversion or breast pain. Denies constitutional symptoms.

## 2023-09-08 ENCOUNTER — APPOINTMENT (OUTPATIENT)
Dept: OTOLARYNGOLOGY | Facility: CLINIC | Age: 43
End: 2023-09-08
Payer: COMMERCIAL

## 2023-09-08 PROCEDURE — 92507 TX SP LANG VOICE COMM INDIV: CPT | Mod: GN

## 2023-09-15 ENCOUNTER — APPOINTMENT (OUTPATIENT)
Dept: OTOLARYNGOLOGY | Facility: CLINIC | Age: 43
End: 2023-09-15

## 2023-09-15 DIAGNOSIS — E89.0 POSTPROCEDURAL HYPOTHYROIDISM: ICD-10-CM

## 2023-09-19 ENCOUNTER — NON-APPOINTMENT (OUTPATIENT)
Age: 43
End: 2023-09-19

## 2023-09-20 ENCOUNTER — APPOINTMENT (OUTPATIENT)
Dept: OTOLARYNGOLOGY | Facility: CLINIC | Age: 43
End: 2023-09-20
Payer: COMMERCIAL

## 2023-09-20 VITALS
DIASTOLIC BLOOD PRESSURE: 78 MMHG | HEIGHT: 63 IN | HEART RATE: 77 BPM | SYSTOLIC BLOOD PRESSURE: 127 MMHG | WEIGHT: 115 LBS | BODY MASS INDEX: 20.38 KG/M2

## 2023-09-20 DIAGNOSIS — R49.0 DYSPHONIA: ICD-10-CM

## 2023-09-20 PROCEDURE — 99214 OFFICE O/P EST MOD 30 MIN: CPT | Mod: 25

## 2023-09-20 PROCEDURE — 31579 LARYNGOSCOPY TELESCOPIC: CPT

## 2023-09-22 ENCOUNTER — APPOINTMENT (OUTPATIENT)
Dept: PULMONOLOGY | Facility: CLINIC | Age: 43
End: 2023-09-22
Payer: COMMERCIAL

## 2023-09-22 VITALS
WEIGHT: 115 LBS | BODY MASS INDEX: 20.38 KG/M2 | RESPIRATION RATE: 16 BRPM | HEIGHT: 63 IN | SYSTOLIC BLOOD PRESSURE: 110 MMHG | HEART RATE: 74 BPM | TEMPERATURE: 97.3 F | OXYGEN SATURATION: 99 % | DIASTOLIC BLOOD PRESSURE: 64 MMHG

## 2023-09-22 DIAGNOSIS — J38.01 PARALYSIS OF VOCAL CORDS AND LARYNX, UNILATERAL: ICD-10-CM

## 2023-09-22 PROCEDURE — 94727 GAS DIL/WSHOT DETER LNG VOL: CPT

## 2023-09-22 PROCEDURE — 94729 DIFFUSING CAPACITY: CPT

## 2023-09-22 PROCEDURE — 94010 BREATHING CAPACITY TEST: CPT

## 2023-09-22 PROCEDURE — ZZZZZ: CPT

## 2023-09-22 PROCEDURE — 99214 OFFICE O/P EST MOD 30 MIN: CPT | Mod: 25

## 2023-09-22 PROCEDURE — 95012 NITRIC OXIDE EXP GAS DETER: CPT

## 2023-10-02 ENCOUNTER — NON-APPOINTMENT (OUTPATIENT)
Age: 43
End: 2023-10-02

## 2023-10-02 ENCOUNTER — APPOINTMENT (OUTPATIENT)
Dept: GASTROENTEROLOGY | Facility: CLINIC | Age: 43
End: 2023-10-02
Payer: COMMERCIAL

## 2023-10-02 VITALS
DIASTOLIC BLOOD PRESSURE: 80 MMHG | WEIGHT: 115 LBS | HEART RATE: 92 BPM | SYSTOLIC BLOOD PRESSURE: 130 MMHG | BODY MASS INDEX: 20.37 KG/M2 | OXYGEN SATURATION: 98 %

## 2023-10-02 DIAGNOSIS — K58.0 IRRITABLE BOWEL SYNDROME WITH DIARRHEA: ICD-10-CM

## 2023-10-02 DIAGNOSIS — R13.12 DYSPHAGIA, OROPHARYNGEAL PHASE: ICD-10-CM

## 2023-10-02 DIAGNOSIS — R11.0 NAUSEA: ICD-10-CM

## 2023-10-02 PROCEDURE — 99214 OFFICE O/P EST MOD 30 MIN: CPT

## 2023-10-03 ENCOUNTER — APPOINTMENT (OUTPATIENT)
Dept: OTOLARYNGOLOGY | Facility: CLINIC | Age: 43
End: 2023-10-03
Payer: COMMERCIAL

## 2023-10-03 PROCEDURE — 92507 TX SP LANG VOICE COMM INDIV: CPT | Mod: GN

## 2023-10-16 ENCOUNTER — RX RENEWAL (OUTPATIENT)
Age: 43
End: 2023-10-16

## 2023-10-17 ENCOUNTER — APPOINTMENT (OUTPATIENT)
Dept: OTOLARYNGOLOGY | Facility: CLINIC | Age: 43
End: 2023-10-17
Payer: COMMERCIAL

## 2023-10-17 PROCEDURE — 92507 TX SP LANG VOICE COMM INDIV: CPT | Mod: GN

## 2023-10-31 ENCOUNTER — TRANSCRIPTION ENCOUNTER (OUTPATIENT)
Age: 43
End: 2023-10-31

## 2023-11-02 ENCOUNTER — NON-APPOINTMENT (OUTPATIENT)
Age: 43
End: 2023-11-02

## 2023-11-02 ENCOUNTER — LABORATORY RESULT (OUTPATIENT)
Age: 43
End: 2023-11-02

## 2023-11-02 ENCOUNTER — APPOINTMENT (OUTPATIENT)
Dept: INTERNAL MEDICINE | Facility: CLINIC | Age: 43
End: 2023-11-02
Payer: COMMERCIAL

## 2023-11-02 VITALS
WEIGHT: 115 LBS | BODY MASS INDEX: 20.38 KG/M2 | HEIGHT: 63 IN | DIASTOLIC BLOOD PRESSURE: 80 MMHG | OXYGEN SATURATION: 98 % | TEMPERATURE: 98.9 F | SYSTOLIC BLOOD PRESSURE: 142 MMHG | HEART RATE: 78 BPM

## 2023-11-02 VITALS — SYSTOLIC BLOOD PRESSURE: 120 MMHG | DIASTOLIC BLOOD PRESSURE: 70 MMHG

## 2023-11-02 DIAGNOSIS — Z23 ENCOUNTER FOR IMMUNIZATION: ICD-10-CM

## 2023-11-02 DIAGNOSIS — R42 DIZZINESS AND GIDDINESS: ICD-10-CM

## 2023-11-02 PROCEDURE — 36415 COLL VENOUS BLD VENIPUNCTURE: CPT

## 2023-11-02 PROCEDURE — G0008: CPT

## 2023-11-02 PROCEDURE — 99214 OFFICE O/P EST MOD 30 MIN: CPT | Mod: 25

## 2023-11-02 PROCEDURE — 90686 IIV4 VACC NO PRSV 0.5 ML IM: CPT

## 2023-11-02 PROCEDURE — 93000 ELECTROCARDIOGRAM COMPLETE: CPT

## 2023-11-03 ENCOUNTER — TRANSCRIPTION ENCOUNTER (OUTPATIENT)
Age: 43
End: 2023-11-03

## 2023-11-03 LAB
ALBUMIN SERPL ELPH-MCNC: 4.8 G/DL
ALP BLD-CCNC: 39 U/L
ALT SERPL-CCNC: 24 U/L
ANION GAP SERPL CALC-SCNC: 13 MMOL/L
AST SERPL-CCNC: 16 U/L
BILIRUB SERPL-MCNC: 0.2 MG/DL
BUN SERPL-MCNC: 15 MG/DL
CALCIUM SERPL-MCNC: 8.7 MG/DL
CHLORIDE SERPL-SCNC: 102 MMOL/L
CHOLEST SERPL-MCNC: 180 MG/DL
CO2 SERPL-SCNC: 23 MMOL/L
CREAT SERPL-MCNC: 0.79 MG/DL
EGFR: 94 ML/MIN/1.73M2
ESTIMATED AVERAGE GLUCOSE: 111 MG/DL
GLUCOSE SERPL-MCNC: 99 MG/DL
HBA1C MFR BLD HPLC: 5.5 %
HCT VFR BLD CALC: 40.1 %
HDLC SERPL-MCNC: 52 MG/DL
HGB BLD-MCNC: 13.1 G/DL
LDLC SERPL CALC-MCNC: 114 MG/DL
MCHC RBC-ENTMCNC: 28.5 PG
MCHC RBC-ENTMCNC: 32.7 GM/DL
MCV RBC AUTO: 87.4 FL
NONHDLC SERPL-MCNC: 128 MG/DL
PLATELET # BLD AUTO: 341 K/UL
POTASSIUM SERPL-SCNC: 3.9 MMOL/L
PROT SERPL-MCNC: 7.5 G/DL
RBC # BLD: 4.59 M/UL
RBC # FLD: 13.2 %
SODIUM SERPL-SCNC: 139 MMOL/L
TRIGL SERPL-MCNC: 73 MG/DL
TSH SERPL-ACNC: 0.11 UIU/ML
WBC # FLD AUTO: 9.28 K/UL

## 2023-11-06 RX ORDER — ALBUTEROL SULFATE 90 UG/1
108 (90 BASE) INHALANT RESPIRATORY (INHALATION)
Qty: 8 | Refills: 0 | Status: ACTIVE | COMMUNITY
Start: 2023-06-07

## 2023-11-07 ENCOUNTER — APPOINTMENT (OUTPATIENT)
Dept: OTOLARYNGOLOGY | Facility: CLINIC | Age: 43
End: 2023-11-07
Payer: COMMERCIAL

## 2023-11-07 PROCEDURE — 99214 OFFICE O/P EST MOD 30 MIN: CPT | Mod: 95

## 2023-11-08 ENCOUNTER — APPOINTMENT (OUTPATIENT)
Dept: MRI IMAGING | Facility: CLINIC | Age: 43
End: 2023-11-08
Payer: COMMERCIAL

## 2023-11-08 ENCOUNTER — APPOINTMENT (OUTPATIENT)
Dept: ULTRASOUND IMAGING | Facility: CLINIC | Age: 43
End: 2023-11-08
Payer: COMMERCIAL

## 2023-11-08 ENCOUNTER — RESULT REVIEW (OUTPATIENT)
Age: 43
End: 2023-11-08

## 2023-11-08 ENCOUNTER — APPOINTMENT (OUTPATIENT)
Dept: MAMMOGRAPHY | Facility: CLINIC | Age: 43
End: 2023-11-08
Payer: COMMERCIAL

## 2023-11-08 PROCEDURE — 70551 MRI BRAIN STEM W/O DYE: CPT

## 2023-11-08 PROCEDURE — 76641 ULTRASOUND BREAST COMPLETE: CPT | Mod: 50

## 2023-11-08 PROCEDURE — 77063 BREAST TOMOSYNTHESIS BI: CPT

## 2023-11-08 PROCEDURE — 77067 SCR MAMMO BI INCL CAD: CPT

## 2023-11-13 ENCOUNTER — NON-APPOINTMENT (OUTPATIENT)
Age: 43
End: 2023-11-13

## 2023-11-13 ENCOUNTER — APPOINTMENT (OUTPATIENT)
Dept: SPINE | Facility: CLINIC | Age: 43
End: 2023-11-13
Payer: COMMERCIAL

## 2023-11-13 VITALS
BODY MASS INDEX: 20.38 KG/M2 | DIASTOLIC BLOOD PRESSURE: 89 MMHG | WEIGHT: 115 LBS | OXYGEN SATURATION: 98 % | HEIGHT: 63 IN | HEART RATE: 93 BPM | SYSTOLIC BLOOD PRESSURE: 137 MMHG

## 2023-11-13 PROCEDURE — 99204 OFFICE O/P NEW MOD 45 MIN: CPT

## 2023-11-27 ENCOUNTER — APPOINTMENT (OUTPATIENT)
Dept: INTERNAL MEDICINE | Facility: CLINIC | Age: 43
End: 2023-11-27
Payer: COMMERCIAL

## 2023-11-27 VITALS
RESPIRATION RATE: 15 BRPM | TEMPERATURE: 99.3 F | DIASTOLIC BLOOD PRESSURE: 80 MMHG | SYSTOLIC BLOOD PRESSURE: 137 MMHG | BODY MASS INDEX: 20.38 KG/M2 | HEART RATE: 86 BPM | WEIGHT: 115 LBS | HEIGHT: 63 IN | OXYGEN SATURATION: 97 %

## 2023-11-27 VITALS — SYSTOLIC BLOOD PRESSURE: 110 MMHG | DIASTOLIC BLOOD PRESSURE: 72 MMHG

## 2023-11-27 DIAGNOSIS — Z00.00 ENCOUNTER FOR GENERAL ADULT MEDICAL EXAMINATION W/OUT ABNORMAL FINDINGS: ICD-10-CM

## 2023-11-27 PROCEDURE — 99396 PREV VISIT EST AGE 40-64: CPT | Mod: 25

## 2023-12-11 ENCOUNTER — APPOINTMENT (OUTPATIENT)
Dept: CARDIOLOGY | Facility: CLINIC | Age: 43
End: 2023-12-11
Payer: COMMERCIAL

## 2023-12-11 ENCOUNTER — NON-APPOINTMENT (OUTPATIENT)
Age: 43
End: 2023-12-11

## 2023-12-11 VITALS
OXYGEN SATURATION: 99 % | SYSTOLIC BLOOD PRESSURE: 115 MMHG | BODY MASS INDEX: 20.73 KG/M2 | TEMPERATURE: 98.7 F | HEART RATE: 82 BPM | WEIGHT: 117 LBS | HEIGHT: 63 IN | DIASTOLIC BLOOD PRESSURE: 78 MMHG | RESPIRATION RATE: 16 BRPM

## 2023-12-11 PROCEDURE — 93000 ELECTROCARDIOGRAM COMPLETE: CPT

## 2023-12-11 PROCEDURE — 99214 OFFICE O/P EST MOD 30 MIN: CPT

## 2023-12-14 ENCOUNTER — APPOINTMENT (OUTPATIENT)
Dept: MRI IMAGING | Facility: CLINIC | Age: 43
End: 2023-12-14

## 2023-12-14 ENCOUNTER — OUTPATIENT (OUTPATIENT)
Dept: OUTPATIENT SERVICES | Facility: HOSPITAL | Age: 43
LOS: 1 days | End: 2023-12-14
Payer: COMMERCIAL

## 2023-12-14 ENCOUNTER — APPOINTMENT (OUTPATIENT)
Dept: CT IMAGING | Facility: CLINIC | Age: 43
End: 2023-12-14

## 2023-12-14 ENCOUNTER — APPOINTMENT (OUTPATIENT)
Dept: CARDIOLOGY | Facility: CLINIC | Age: 43
End: 2023-12-14
Payer: COMMERCIAL

## 2023-12-14 DIAGNOSIS — D32.9 BENIGN NEOPLASM OF MENINGES, UNSPECIFIED: ICD-10-CM

## 2023-12-14 DIAGNOSIS — Z98.890 OTHER SPECIFIED POSTPROCEDURAL STATES: Chronic | ICD-10-CM

## 2023-12-14 PROCEDURE — 70553 MRI BRAIN STEM W/O & W/DYE: CPT | Mod: 26

## 2023-12-14 PROCEDURE — A9585: CPT

## 2023-12-14 PROCEDURE — 70450 CT HEAD/BRAIN W/O DYE: CPT | Mod: 26

## 2023-12-14 PROCEDURE — 70450 CT HEAD/BRAIN W/O DYE: CPT

## 2023-12-14 PROCEDURE — 70553 MRI BRAIN STEM W/O & W/DYE: CPT

## 2023-12-14 PROCEDURE — 93246 EXT ECG>7D<15D RECORDING: CPT

## 2023-12-15 ENCOUNTER — NON-APPOINTMENT (OUTPATIENT)
Age: 43
End: 2023-12-15

## 2023-12-26 PROCEDURE — 93224 XTRNL ECG REC UP TO 48 HRS: CPT

## 2023-12-28 ENCOUNTER — APPOINTMENT (OUTPATIENT)
Dept: INTERNAL MEDICINE | Facility: CLINIC | Age: 43
End: 2023-12-28
Payer: COMMERCIAL

## 2023-12-28 ENCOUNTER — OUTPATIENT (OUTPATIENT)
Dept: OUTPATIENT SERVICES | Facility: HOSPITAL | Age: 43
LOS: 1 days | End: 2023-12-28
Payer: COMMERCIAL

## 2023-12-28 VITALS
RESPIRATION RATE: 15 BRPM | WEIGHT: 117 LBS | HEIGHT: 63 IN | SYSTOLIC BLOOD PRESSURE: 106 MMHG | DIASTOLIC BLOOD PRESSURE: 70 MMHG | BODY MASS INDEX: 20.73 KG/M2 | OXYGEN SATURATION: 97 % | TEMPERATURE: 99.1 F | HEART RATE: 89 BPM

## 2023-12-28 VITALS
SYSTOLIC BLOOD PRESSURE: 130 MMHG | OXYGEN SATURATION: 98 % | RESPIRATION RATE: 18 BRPM | DIASTOLIC BLOOD PRESSURE: 78 MMHG | HEIGHT: 63 IN | WEIGHT: 117.95 LBS | TEMPERATURE: 98 F | HEART RATE: 88 BPM

## 2023-12-28 DIAGNOSIS — Z01.818 ENCOUNTER FOR OTHER PREPROCEDURAL EXAMINATION: ICD-10-CM

## 2023-12-28 DIAGNOSIS — E03.9 HYPOTHYROIDISM, UNSPECIFIED: ICD-10-CM

## 2023-12-28 DIAGNOSIS — Z98.890 OTHER SPECIFIED POSTPROCEDURAL STATES: Chronic | ICD-10-CM

## 2023-12-28 DIAGNOSIS — D49.7 NEOPLASM OF UNSPECIFIED BEHAVIOR OF ENDOCRINE GLANDS AND OTHER PARTS OF NERVOUS SYSTEM: ICD-10-CM

## 2023-12-28 DIAGNOSIS — D32.9 BENIGN NEOPLASM OF MENINGES, UNSPECIFIED: ICD-10-CM

## 2023-12-28 LAB
A1C WITH ESTIMATED AVERAGE GLUCOSE RESULT: 5.3 % — SIGNIFICANT CHANGE UP (ref 4–5.6)
A1C WITH ESTIMATED AVERAGE GLUCOSE RESULT: 5.3 % — SIGNIFICANT CHANGE UP (ref 4–5.6)
ANION GAP SERPL CALC-SCNC: 12 MMOL/L — SIGNIFICANT CHANGE UP (ref 5–17)
ANION GAP SERPL CALC-SCNC: 12 MMOL/L — SIGNIFICANT CHANGE UP (ref 5–17)
BLD GP AB SCN SERPL QL: NEGATIVE — SIGNIFICANT CHANGE UP
BLD GP AB SCN SERPL QL: NEGATIVE — SIGNIFICANT CHANGE UP
BUN SERPL-MCNC: 11 MG/DL — SIGNIFICANT CHANGE UP (ref 7–23)
BUN SERPL-MCNC: 11 MG/DL — SIGNIFICANT CHANGE UP (ref 7–23)
CALCIUM SERPL-MCNC: 8.5 MG/DL — SIGNIFICANT CHANGE UP (ref 8.4–10.5)
CALCIUM SERPL-MCNC: 8.5 MG/DL — SIGNIFICANT CHANGE UP (ref 8.4–10.5)
CHLORIDE SERPL-SCNC: 102 MMOL/L — SIGNIFICANT CHANGE UP (ref 96–108)
CHLORIDE SERPL-SCNC: 102 MMOL/L — SIGNIFICANT CHANGE UP (ref 96–108)
CO2 SERPL-SCNC: 24 MMOL/L — SIGNIFICANT CHANGE UP (ref 22–31)
CO2 SERPL-SCNC: 24 MMOL/L — SIGNIFICANT CHANGE UP (ref 22–31)
CREAT SERPL-MCNC: 0.75 MG/DL — SIGNIFICANT CHANGE UP (ref 0.5–1.3)
CREAT SERPL-MCNC: 0.75 MG/DL — SIGNIFICANT CHANGE UP (ref 0.5–1.3)
EGFR: 100 ML/MIN/1.73M2 — SIGNIFICANT CHANGE UP
EGFR: 100 ML/MIN/1.73M2 — SIGNIFICANT CHANGE UP
ESTIMATED AVERAGE GLUCOSE: 105 MG/DL — SIGNIFICANT CHANGE UP (ref 68–114)
ESTIMATED AVERAGE GLUCOSE: 105 MG/DL — SIGNIFICANT CHANGE UP (ref 68–114)
GLUCOSE SERPL-MCNC: 90 MG/DL — SIGNIFICANT CHANGE UP (ref 70–99)
GLUCOSE SERPL-MCNC: 90 MG/DL — SIGNIFICANT CHANGE UP (ref 70–99)
HCT VFR BLD CALC: 38.7 % — SIGNIFICANT CHANGE UP (ref 34.5–45)
HCT VFR BLD CALC: 38.7 % — SIGNIFICANT CHANGE UP (ref 34.5–45)
HGB BLD-MCNC: 12.9 G/DL — SIGNIFICANT CHANGE UP (ref 11.5–15.5)
HGB BLD-MCNC: 12.9 G/DL — SIGNIFICANT CHANGE UP (ref 11.5–15.5)
MCHC RBC-ENTMCNC: 28.9 PG — SIGNIFICANT CHANGE UP (ref 27–34)
MCHC RBC-ENTMCNC: 28.9 PG — SIGNIFICANT CHANGE UP (ref 27–34)
MCHC RBC-ENTMCNC: 33.3 GM/DL — SIGNIFICANT CHANGE UP (ref 32–36)
MCHC RBC-ENTMCNC: 33.3 GM/DL — SIGNIFICANT CHANGE UP (ref 32–36)
MCV RBC AUTO: 86.8 FL — SIGNIFICANT CHANGE UP (ref 80–100)
MCV RBC AUTO: 86.8 FL — SIGNIFICANT CHANGE UP (ref 80–100)
NRBC # BLD: 0 /100 WBCS — SIGNIFICANT CHANGE UP (ref 0–0)
NRBC # BLD: 0 /100 WBCS — SIGNIFICANT CHANGE UP (ref 0–0)
PLATELET # BLD AUTO: 323 K/UL — SIGNIFICANT CHANGE UP (ref 150–400)
PLATELET # BLD AUTO: 323 K/UL — SIGNIFICANT CHANGE UP (ref 150–400)
POTASSIUM SERPL-MCNC: 3.6 MMOL/L — SIGNIFICANT CHANGE UP (ref 3.5–5.3)
POTASSIUM SERPL-MCNC: 3.6 MMOL/L — SIGNIFICANT CHANGE UP (ref 3.5–5.3)
POTASSIUM SERPL-SCNC: 3.6 MMOL/L — SIGNIFICANT CHANGE UP (ref 3.5–5.3)
POTASSIUM SERPL-SCNC: 3.6 MMOL/L — SIGNIFICANT CHANGE UP (ref 3.5–5.3)
RBC # BLD: 4.46 M/UL — SIGNIFICANT CHANGE UP (ref 3.8–5.2)
RBC # BLD: 4.46 M/UL — SIGNIFICANT CHANGE UP (ref 3.8–5.2)
RBC # FLD: 13 % — SIGNIFICANT CHANGE UP (ref 10.3–14.5)
RBC # FLD: 13 % — SIGNIFICANT CHANGE UP (ref 10.3–14.5)
RH IG SCN BLD-IMP: POSITIVE — SIGNIFICANT CHANGE UP
RH IG SCN BLD-IMP: POSITIVE — SIGNIFICANT CHANGE UP
SODIUM SERPL-SCNC: 138 MMOL/L — SIGNIFICANT CHANGE UP (ref 135–145)
SODIUM SERPL-SCNC: 138 MMOL/L — SIGNIFICANT CHANGE UP (ref 135–145)
WBC # BLD: 6.83 K/UL — SIGNIFICANT CHANGE UP (ref 3.8–10.5)
WBC # BLD: 6.83 K/UL — SIGNIFICANT CHANGE UP (ref 3.8–10.5)
WBC # FLD AUTO: 6.83 K/UL — SIGNIFICANT CHANGE UP (ref 3.8–10.5)
WBC # FLD AUTO: 6.83 K/UL — SIGNIFICANT CHANGE UP (ref 3.8–10.5)

## 2023-12-28 PROCEDURE — 80048 BASIC METABOLIC PNL TOTAL CA: CPT

## 2023-12-28 PROCEDURE — 83036 HEMOGLOBIN GLYCOSYLATED A1C: CPT

## 2023-12-28 PROCEDURE — 86900 BLOOD TYPING SEROLOGIC ABO: CPT

## 2023-12-28 PROCEDURE — 85027 COMPLETE CBC AUTOMATED: CPT

## 2023-12-28 PROCEDURE — G0463: CPT

## 2023-12-28 PROCEDURE — 86901 BLOOD TYPING SEROLOGIC RH(D): CPT

## 2023-12-28 PROCEDURE — 86850 RBC ANTIBODY SCREEN: CPT

## 2023-12-28 PROCEDURE — 99214 OFFICE O/P EST MOD 30 MIN: CPT

## 2023-12-28 PROCEDURE — 87641 MR-STAPH DNA AMP PROBE: CPT

## 2023-12-28 PROCEDURE — 36415 COLL VENOUS BLD VENIPUNCTURE: CPT

## 2023-12-28 PROCEDURE — 87640 STAPH A DNA AMP PROBE: CPT

## 2023-12-28 NOTE — H&P PST ADULT - NSICDXPASTSURGICALHX_GEN_ALL_CORE_FT
PAST SURGICAL HISTORY:  H/O myomectomy 2014    History of lumpectomy     S/P laparoscopic cholecystectomy 2002    S/P total thyroidectomy 2010

## 2023-12-28 NOTE — H&P PST ADULT - PROBLEM SELECTOR PLAN 1
Pt. scheduled for Laparoscopic Myomectomy, Left ovarian Cystectomy, Possible Exploratory Laparotomy ERP with Dr. Castano on 1/18/2024.  Pre-op instructions given, all questions answered.  Surgical Soap and specimen cup given.  Labs: CBC, BMP, T&S, MRSA

## 2023-12-28 NOTE — H&P PST ADULT - HISTORY OF PRESENT ILLNESS
43 year old female presents to PST prior to scheduled Left Craniotomy Removal Meningioma with Dr. Perez on 1/12/2024. PMhx thyroid cancer, status post partial thyroidectomy 12 years ago, childhood asthma, status post COVID-19 infection June 2022 and received monoclonal antibody infusion. PShx cholecystectomy, removal of fibroid x2,      The patient had been complaining of headaches, vertigo-like symptoms for close to a year. She went into a sauna approximately a month ago, felt dizzy lightheaded increased heart rate and had transient loss of consciousness. Patient had an MRI and was found to have a left-sided meningioma and will be having surgery January 2024.  vocal cord surgery   Of Note: pt had recent palpitation episode in 2022 and 2023, she was seen by Dr. Geoff Conklinoke to Dr. ASHER as per Vocal radiation 43 year old female presents to PST prior to scheduled Left Craniotomy Removal Meningioma with Dr. Perez on 1/12/2024. PMhx thyroid cancer, status post partial thyroidectomy 12 years ago, childhood asthma, status post COVID-19 infection June 2022 and received monoclonal antibody infusion. PShx cholecystectomy, removal of fibroid x2. C/o headaches, vertigo-like symptoms for close to a year. She went into a sauna approximately a month ago, felt dizzy lightheaded increased heart rate and had transient loss of consciousness. Patient had an MRI and was found to have a left-sided meningioma. Denies any chest pain, palpitation at this, denies SOB, N/V, fever or chills.    Of note: pt had thyroidectomy 12 years ago s/p radioactive Iodine. 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 She denies any choking or aspiration but states she has to eat in very small bites slowly. (pt was discussed with Dr. Erickson) RAMIREZ note in chart. Pt. also reports recent palpitation episode in 2022 and 2023, she was seen by Dr. Tellez wore halter monitor for one week (12/22/23-12/27/23). As per pt no abnormal results.

## 2023-12-28 NOTE — H&P PST ADULT - ASSESSMENT
DASI Score:  DASI Activity: Pt was very active did karate and 5k races before headaches, able to go up one flight of stairs or walk 1-2 blocks with out difficulty  Loose or removable teeth: denies  DASI Score: 6.70  DASI Activity: Pt was very active did karate and 5k races before headaches, able to go up one flight of stairs or walk 1-2 blocks with out difficulty (strenous activities are limited at this moment due to headaches)  Loose or removable teeth: denies

## 2023-12-28 NOTE — H&P PST ADULT - LAST STRESS TEST
"The patient had normal exercise stress test December 6, 2022. She was able to talk through the entire test comfortably." as per Dr. Tellez note

## 2023-12-28 NOTE — H&P PST ADULT - NSICDXPASTMEDICALHX_GEN_ALL_CORE_FT
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    Thyroid cancer 2010

## 2023-12-28 NOTE — H&P PST ADULT - OTHER CARE PROVIDERS
Dr. Munoz (ENT/ [facility]) [phone] last seen: 6/2023, Dr. Allison Tellez: [phone] 12/28/23 Dr. Munoz (ENT/ [facility]) [phone] last seen: 6/2023, Dr. Allison Tellez: [phone] 12/28/23

## 2023-12-29 DIAGNOSIS — R06.09 OTHER FORMS OF DYSPNEA: ICD-10-CM

## 2023-12-29 PROBLEM — D49.7 NEOPLASM OF UNSPECIFIED BEHAVIOR OF ENDOCRINE GLANDS AND OTHER PARTS OF NERVOUS SYSTEM: Chronic | Status: ACTIVE | Noted: 2023-12-28

## 2023-12-29 PROBLEM — E03.9 HYPOTHYROIDISM, UNSPECIFIED: Chronic | Status: ACTIVE | Noted: 2023-12-28

## 2023-12-29 LAB
MRSA PCR RESULT.: SIGNIFICANT CHANGE UP
MRSA PCR RESULT.: SIGNIFICANT CHANGE UP
S AUREUS DNA NOSE QL NAA+PROBE: SIGNIFICANT CHANGE UP
S AUREUS DNA NOSE QL NAA+PROBE: SIGNIFICANT CHANGE UP

## 2023-12-29 NOTE — ASSESSMENT
[Patient Optimized for Surgery] : Patient optimized for surgery [FreeTextEntry4] : - Planned for resection of meningioma by Dr Broussard Jan 12, 2024.  - awaiting labs from [facility] - cardiac clearance from Dr Tellez. - no prior adverse effects to anesthesia  - able to perform >7METs - to avoid ASA, NSAIDs and vitamins for 1 weeks prior to surgery  Addendum  12/29/23 - bloodwork from PST reviewed and WNL

## 2023-12-29 NOTE — REVIEW OF SYSTEMS
[Fever] : no fever [Chills] : no chills [Fatigue] : no fatigue [Pain] : no pain [Vision Problems] : no vision problems [Nasal Discharge] : no nasal discharge [Chest Pain] : no chest pain [Palpitations] : no palpitations [Lower Ext Edema] : no lower extremity edema [Shortness Of Breath] : no shortness of breath [Wheezing] : no wheezing [Cough] : no cough [Dyspnea on Exertion] : no dyspnea on exertion [Abdominal Pain] : no abdominal pain [Nausea] : no nausea [Diarrhea] : diarrhea [Vomiting] : no vomiting [Dysuria] : no dysuria [Muscle Weakness] : no muscle weakness [Muscle Pain] : no muscle pain [Skin Rash] : no skin rash [Headache] : no headache [Dizziness] : no dizziness [Fainting] : no fainting

## 2023-12-29 NOTE — HISTORY OF PRESENT ILLNESS
[Asthma] : asthma [No Adverse Anesthesia Reaction] : no adverse anesthesia reaction in self or family member [(Patient denies any chest pain, claudication, dyspnea on exertion, orthopnea, palpitations or syncope)] : Patient denies any chest pain, claudication, dyspnea on exertion, orthopnea, palpitations or syncope [Aortic Stenosis] : no aortic stenosis [Atrial Fibrillation] : no atrial fibrillation [Coronary Artery Disease] : no coronary artery disease [Recent Myocardial Infarction] : no recent myocardial infarction [Implantable Device/Pacemaker] : no implantable device/pacemaker [COPD] : no COPD [Sleep Apnea] : no sleep apnea [Smoker] : not a smoker [Family Member] : no family member with adverse anesthesia reaction/sudden death [Self] : no previous adverse anesthesia reaction [Chronic Anticoagulation] : no chronic anticoagulation [Chronic Kidney Disease] : no chronic kidney disease [Diabetes] : no diabetes [FreeTextEntry1] : meningioma resection [FreeTextEntry2] : 1/12/24 [FreeTextEntry3] : Dr Broussard [FreeTextEntry4] : 42 yo F pmh hypothyroidism and parathyroid disease s/p thyroidectomy and parathyroidectomy and radiation for thyroid cancer presents for preop clearance.  Accompanied by . Planned for resection of meningioma by Dr Broussard Jan 12, 2024.  Had PST today, awaiting results. Patient denies prior reactions to anesthesia in the past or in her family. Is not taking any blood thinners, NSAIDs or herbal supplements. Patient states she is able to walk 100 feet and climb 3 flights of stairs without SOB. Denies chest pain, SOB, dizziness, lightheadedness, fevers, chills, nausea, vomiting. Reports headaches from meningioma. Has been persistent the past few months.

## 2024-01-02 NOTE — REASON FOR VISIT
[FreeTextEntry3] : Dr. Tanner [FreeTextEntry1] : This is a 43-year-old female with past medical history significant for thyroid cancer, status post partial thyroidectomy 12 years ago, childhood asthma, status post COVID-19 infection June 2022 and received monoclonal antibody infusion, status post cholecystectomy, status post removal of fibroid, who comes in for cardiac follow-up evaluation. The patient had been complaining of headaches, vertigo-like symptoms for close to a year.  She went into a sauna approximately a month ago, felt dizzy lightheaded increased heart rate and had transient loss of consciousness.  Patient had an MRI and was found to have a left-sided meningioma and will be having surgery January 12th, 2024.   PMHx:  Her past medical history is significant for thyroid cancer and COVID19 infection (June 2022).  Patient had been experiencing episodes of shortness of breath and chest pain, especially worse when running in 2022. She has been an avid runner and has not been able to run the same since having COVID. She is still under care of [place] doctors for thyroid cancer which was diagnosed 12 years ago - she got surgery 12 years ago but they were unable to remove the full thyroid. She received monoclonal antibodies due to her cancer history.

## 2024-01-02 NOTE — DISCUSSION/SUMMARY
[FreeTextEntry1] : This is a 43-year-old female with past medical history significant for thyroid cancer, status post partial thyroidectomy 12 years ago, childhood asthma, status post COVID-19 infection June 2022 and received monoclonal antibody infusion, status post cholecystectomy, status post removal of fibroid, who comes in for cardiac follow-up evaluation. She denies palpitations, dizziness, or chest pressure.  She has no history of rheumatic fever.  She does not drink excessive caffeine or alcohol. She has no known cardiac risk factors. The patient had been complaining of headaches and vertigo-like symptoms for 1 year. She was visiting a friend in [place], went to a spa, had a massage and then went into the sauna (October 2023), she felt lightheaded, had increased heart rate, warm, tingling in her fingers and had transient loss of consciousness.  She did not seek medical attention at that time.  She was seen by her primary care physician who ordered an MRI and was found to have a left-sided meningioma. She also had 1 other episode of increased heart rate after having a half a glass of wine. Electrocardiogram done December 11, 2023 demonstrated normal sinus rhythm rate 72 bpm is otherwise unremarkable. The patient will have a Holter monitor done today to rule out significant arrhythmia or heart block.  If there is no significant findings, I recommend the patient have a 2-week event monitor. Echo Doppler examination done December 29, 2022 demonstrated physiologic mitral valve regurgitation, mild tricuspid valve regurgitation, thinning of the interatrial septum with normal ejection fraction of 66%. Lipid panel done November 2, 2023 demonstrated cholesterol 180, HDL 52, , non-, triglycerides 73, hemoglobin A1c of 5.5, and hemoglobin of 13.1 hematocrit of 40. The patient had normal exercise stress test December 6, 2022.  She was able to talk through the entire test comfortably. The patient is scheduled for neurosurgical procedure January 2024 for removal of her meningioma. I have also recommended she make an appoint with a neurologist. She will continue to monitor her heart rate with her watch.  She is also encouraged to increase her hydration. She is currently hemodynamically stable and asymptomatic from cardiac standpoint.  She has no orthostatic changes on clinical examination. Blood work done October 17, 2022 demonstrated a cholesterol of 205, HDL of 56, triglycerides of 75, LDL direct of 136, non-HDL cholesterol 150 mg/dL. Lifestyle modification was reinforced.   Electrocardiogram done October 17, 2022 demonstrated normal sinus rhythm rate of 82 bpm and is otherwise unremarkable The patient is encouraged to maintain good hydration. The patient understands that aerobic exercises must be increased to 40 minutes 4 times per week. A detailed discussion of lifestyle modification was done today. The patient has a good understanding of the diagnosis, and treatment plan. Lifestyle modification was also outlined. Thank you for allowing to participate in care of your patient.  Please do not hesitate to call if you have any further questions.  ADDENDUM: January 2, 2024::  This patient is cleared from a cardiac standpoint for surgery.  She has normal left ventricular function with estimated ejection fraction of 66%. Please avoid overhydration.  Maintain prophylaxis for venous thrombosis.  Patient should have an incentive spirometer in the perioperative period.

## 2024-01-02 NOTE — ASSESSMENT
[FreeTextEntry1] : Prior note nurse practitioner Efra February 15, 2023::  This is a 42 year year old female here today for follow up cardiac evaluation.  She has a past medical history significant for thyroid cancer, status post partial thyroidectomy 12 years ago, childhood asthma, status post COVID-19 infection June 2022 and received monoclonal antibody infusion, status post cholecystectomy, status post removal of fibroid.    CHIEF COMPLAINT:  Today she is feeling generally well and does not have any complaints at this time.    She denies fever, chills, weight loss, malaise, rash, alteration bowel habits, weakness, abdominal pain, bloating, changes in urination, visual disturbances, chest pain, headaches, dizziness, heart palpitations, recent episodes of syncope or falls at this time.    She has no known cardiac risk factors.    PMHx:  42 year old female with history of thyroid cancer and COVID19 infection (June 2022) presents for cardiac consult. Patient has been experiencing episodes of shortness of breath and chest pain, especially worse when running. She has been an avid runner and has not been able to run the same since having COVID. She is still under care of [facility] doctors for thyroid cancer which was diagnosed 12 years ago - she got surgery 12 years ago but they were unable to remove the full thyroid. She received monoclonal antibodies due to her cancer history.    BLOOD PRESSURE:  -BP is well controlled in today's visit.    -I have discussed the importance of maintaining good BP control and reviewed the newest guidelines with the patient while re-enforcing dietary sodium restrictions to no more than 2-3 g daily, DASH diet, life style modifications as well as the goal of maintaining ideal body weight with the patient today. I have advised the patient to avoid the use of over-the-counter medications/ supplements especially NSAIDS.    BLOOD WORK:  -New blood work was done 02/15/2023 to evaluate lipid profile, CBC, BMP, hepatic function, A1C and TSH.    CHOLESTEROL CONTROL:  -Patient will continue the advised TLC diet and to continue follow-up for treatment of hyperlipidemia and repeat blood testing with diet and exercise. I have discussed different exercises and the importance of maintenance of optimal body weight. The importance of staying within guidelines and recommendations was stressed to the patient today and they acknowledged that they understand this to me verbally.    TESTING/REPORTS:  -EKG done Feb 15, 2023 which demonstrated regular sinus rhythm with nonspecific ST-T wave changes, BPM of 69.    -Echocardiogram done December 29, 2022 demonstrated mild tricuspid regurgitation with normal left ventricular systolic function ejection fraction 66%.    -The patient had a normal exercise stress test on December 6, 2022    PLAN:  -She will continue with her current medications and will contact the office if she is having any complaints between now and their next follow up appointment.  -She will follow-up with her pulmonologist routinely.    I have discussed the plan of care with Ms. DEREK BAKER and she will follow up in 6 months. She is compliant with all of her medications.    The patient understands that aerobic exercises must be increased to minutes 4 times/week and a detailed discussion of lifestyle modification was done today.  The patient has a good understanding of the diagnosis, treatment plan and lifestyle modification.  She will contact me at the office for any questions with their care or any changes in their health status.      Pratibha DU.

## 2024-01-03 ENCOUNTER — APPOINTMENT (OUTPATIENT)
Dept: CARDIOLOGY | Facility: CLINIC | Age: 44
End: 2024-01-03
Payer: COMMERCIAL

## 2024-01-03 ENCOUNTER — APPOINTMENT (OUTPATIENT)
Dept: SPINE | Facility: CLINIC | Age: 44
End: 2024-01-03
Payer: COMMERCIAL

## 2024-01-03 VITALS
WEIGHT: 117 LBS | OXYGEN SATURATION: 100 % | HEIGHT: 63 IN | BODY MASS INDEX: 20.73 KG/M2 | DIASTOLIC BLOOD PRESSURE: 81 MMHG | HEART RATE: 80 BPM | SYSTOLIC BLOOD PRESSURE: 140 MMHG

## 2024-01-03 PROCEDURE — 99213 OFFICE O/P EST LOW 20 MIN: CPT

## 2024-01-03 PROCEDURE — 93306 TTE W/DOPPLER COMPLETE: CPT

## 2024-01-04 ENCOUNTER — APPOINTMENT (OUTPATIENT)
Dept: CARDIOLOGY | Facility: CLINIC | Age: 44
End: 2024-01-04
Payer: COMMERCIAL

## 2024-01-04 DIAGNOSIS — R55 SYNCOPE AND COLLAPSE: ICD-10-CM

## 2024-01-04 PROCEDURE — 93248 EXT ECG>7D<15D REV&INTERPJ: CPT

## 2024-01-10 RX ORDER — LEVOTHYROXINE SODIUM 175 UG/1
175 TABLET ORAL
Refills: 0 | Status: ACTIVE | COMMUNITY

## 2024-01-10 RX ORDER — LEVOTHYROXINE SODIUM 150 UG/1
150 TABLET ORAL DAILY
Refills: 0 | Status: DISCONTINUED | COMMUNITY
End: 2024-01-10

## 2024-01-11 ENCOUNTER — TRANSCRIPTION ENCOUNTER (OUTPATIENT)
Age: 44
End: 2024-01-11

## 2024-01-12 ENCOUNTER — INPATIENT (INPATIENT)
Facility: HOSPITAL | Age: 44
LOS: 3 days | Discharge: HOME | DRG: 25 | End: 2024-01-16
Attending: NEUROLOGICAL SURGERY | Admitting: NEUROLOGICAL SURGERY
Payer: COMMERCIAL

## 2024-01-12 ENCOUNTER — APPOINTMENT (OUTPATIENT)
Dept: SPINE | Facility: HOSPITAL | Age: 44
End: 2024-01-12

## 2024-01-12 ENCOUNTER — TRANSCRIPTION ENCOUNTER (OUTPATIENT)
Age: 44
End: 2024-01-12

## 2024-01-12 ENCOUNTER — RESULT REVIEW (OUTPATIENT)
Age: 44
End: 2024-01-12

## 2024-01-12 VITALS
OXYGEN SATURATION: 100 % | TEMPERATURE: 98 F | SYSTOLIC BLOOD PRESSURE: 134 MMHG | DIASTOLIC BLOOD PRESSURE: 88 MMHG | WEIGHT: 117.95 LBS | HEIGHT: 62.99 IN | HEART RATE: 80 BPM | RESPIRATION RATE: 18 BRPM

## 2024-01-12 DIAGNOSIS — Z98.890 OTHER SPECIFIED POSTPROCEDURAL STATES: Chronic | ICD-10-CM

## 2024-01-12 DIAGNOSIS — D32.9 BENIGN NEOPLASM OF MENINGES, UNSPECIFIED: ICD-10-CM

## 2024-01-12 LAB
ALBUMIN SERPL ELPH-MCNC: 3.7 G/DL — SIGNIFICANT CHANGE UP (ref 3.3–5)
ALBUMIN SERPL ELPH-MCNC: 3.7 G/DL — SIGNIFICANT CHANGE UP (ref 3.3–5)
ALP SERPL-CCNC: 35 U/L — LOW (ref 40–120)
ALP SERPL-CCNC: 35 U/L — LOW (ref 40–120)
ALT FLD-CCNC: 31 U/L — SIGNIFICANT CHANGE UP (ref 10–45)
ALT FLD-CCNC: 31 U/L — SIGNIFICANT CHANGE UP (ref 10–45)
ANION GAP SERPL CALC-SCNC: 11 MMOL/L — SIGNIFICANT CHANGE UP (ref 5–17)
ANION GAP SERPL CALC-SCNC: 11 MMOL/L — SIGNIFICANT CHANGE UP (ref 5–17)
APTT BLD: 23.5 SEC — LOW (ref 24.5–35.6)
APTT BLD: 23.5 SEC — LOW (ref 24.5–35.6)
AST SERPL-CCNC: 20 U/L — SIGNIFICANT CHANGE UP (ref 10–40)
AST SERPL-CCNC: 20 U/L — SIGNIFICANT CHANGE UP (ref 10–40)
BILIRUB SERPL-MCNC: 0.3 MG/DL — SIGNIFICANT CHANGE UP (ref 0.2–1.2)
BILIRUB SERPL-MCNC: 0.3 MG/DL — SIGNIFICANT CHANGE UP (ref 0.2–1.2)
BLD GP AB SCN SERPL QL: NEGATIVE — SIGNIFICANT CHANGE UP
BLD GP AB SCN SERPL QL: NEGATIVE — SIGNIFICANT CHANGE UP
BUN SERPL-MCNC: 11 MG/DL — SIGNIFICANT CHANGE UP (ref 7–23)
BUN SERPL-MCNC: 11 MG/DL — SIGNIFICANT CHANGE UP (ref 7–23)
CALCIUM SERPL-MCNC: 7 MG/DL — LOW (ref 8.4–10.5)
CALCIUM SERPL-MCNC: 7 MG/DL — LOW (ref 8.4–10.5)
CHLORIDE SERPL-SCNC: 104 MMOL/L — SIGNIFICANT CHANGE UP (ref 96–108)
CHLORIDE SERPL-SCNC: 104 MMOL/L — SIGNIFICANT CHANGE UP (ref 96–108)
CO2 SERPL-SCNC: 22 MMOL/L — SIGNIFICANT CHANGE UP (ref 22–31)
CO2 SERPL-SCNC: 22 MMOL/L — SIGNIFICANT CHANGE UP (ref 22–31)
CREAT SERPL-MCNC: 0.8 MG/DL — SIGNIFICANT CHANGE UP (ref 0.5–1.3)
CREAT SERPL-MCNC: 0.8 MG/DL — SIGNIFICANT CHANGE UP (ref 0.5–1.3)
EGFR: 93 ML/MIN/1.73M2 — SIGNIFICANT CHANGE UP
EGFR: 93 ML/MIN/1.73M2 — SIGNIFICANT CHANGE UP
GLUCOSE BLDC GLUCOMTR-MCNC: 121 MG/DL — HIGH (ref 70–99)
GLUCOSE BLDC GLUCOMTR-MCNC: 121 MG/DL — HIGH (ref 70–99)
GLUCOSE BLDC GLUCOMTR-MCNC: 137 MG/DL — HIGH (ref 70–99)
GLUCOSE BLDC GLUCOMTR-MCNC: 137 MG/DL — HIGH (ref 70–99)
GLUCOSE BLDC GLUCOMTR-MCNC: 146 MG/DL — HIGH (ref 70–99)
GLUCOSE BLDC GLUCOMTR-MCNC: 146 MG/DL — HIGH (ref 70–99)
GLUCOSE SERPL-MCNC: 146 MG/DL — HIGH (ref 70–99)
GLUCOSE SERPL-MCNC: 146 MG/DL — HIGH (ref 70–99)
HCG UR QL: NEGATIVE — SIGNIFICANT CHANGE UP
HCG UR QL: NEGATIVE — SIGNIFICANT CHANGE UP
HCT VFR BLD CALC: 31.7 % — LOW (ref 34.5–45)
HCT VFR BLD CALC: 31.7 % — LOW (ref 34.5–45)
HCT VFR BLD CALC: 32.4 % — LOW (ref 34.5–45)
HCT VFR BLD CALC: 32.4 % — LOW (ref 34.5–45)
HGB BLD-MCNC: 10.7 G/DL — LOW (ref 11.5–15.5)
HGB BLD-MCNC: 10.7 G/DL — LOW (ref 11.5–15.5)
HGB BLD-MCNC: 11 G/DL — LOW (ref 11.5–15.5)
HGB BLD-MCNC: 11 G/DL — LOW (ref 11.5–15.5)
INR BLD: 1.17 RATIO — SIGNIFICANT CHANGE UP (ref 0.85–1.18)
INR BLD: 1.17 RATIO — SIGNIFICANT CHANGE UP (ref 0.85–1.18)
MCHC RBC-ENTMCNC: 28.7 PG — SIGNIFICANT CHANGE UP (ref 27–34)
MCHC RBC-ENTMCNC: 28.7 PG — SIGNIFICANT CHANGE UP (ref 27–34)
MCHC RBC-ENTMCNC: 28.8 PG — SIGNIFICANT CHANGE UP (ref 27–34)
MCHC RBC-ENTMCNC: 28.8 PG — SIGNIFICANT CHANGE UP (ref 27–34)
MCHC RBC-ENTMCNC: 33.8 GM/DL — SIGNIFICANT CHANGE UP (ref 32–36)
MCHC RBC-ENTMCNC: 33.8 GM/DL — SIGNIFICANT CHANGE UP (ref 32–36)
MCHC RBC-ENTMCNC: 34 GM/DL — SIGNIFICANT CHANGE UP (ref 32–36)
MCHC RBC-ENTMCNC: 34 GM/DL — SIGNIFICANT CHANGE UP (ref 32–36)
MCV RBC AUTO: 84.6 FL — SIGNIFICANT CHANGE UP (ref 80–100)
MCV RBC AUTO: 84.6 FL — SIGNIFICANT CHANGE UP (ref 80–100)
MCV RBC AUTO: 85.2 FL — SIGNIFICANT CHANGE UP (ref 80–100)
MCV RBC AUTO: 85.2 FL — SIGNIFICANT CHANGE UP (ref 80–100)
NRBC # BLD: 0 /100 WBCS — SIGNIFICANT CHANGE UP (ref 0–0)
PLATELET # BLD AUTO: 243 K/UL — SIGNIFICANT CHANGE UP (ref 150–400)
PLATELET # BLD AUTO: 243 K/UL — SIGNIFICANT CHANGE UP (ref 150–400)
PLATELET # BLD AUTO: 260 K/UL — SIGNIFICANT CHANGE UP (ref 150–400)
PLATELET # BLD AUTO: 260 K/UL — SIGNIFICANT CHANGE UP (ref 150–400)
POTASSIUM SERPL-MCNC: 3.9 MMOL/L — SIGNIFICANT CHANGE UP (ref 3.5–5.3)
POTASSIUM SERPL-MCNC: 3.9 MMOL/L — SIGNIFICANT CHANGE UP (ref 3.5–5.3)
POTASSIUM SERPL-SCNC: 3.9 MMOL/L — SIGNIFICANT CHANGE UP (ref 3.5–5.3)
POTASSIUM SERPL-SCNC: 3.9 MMOL/L — SIGNIFICANT CHANGE UP (ref 3.5–5.3)
PROT SERPL-MCNC: 6.2 G/DL — SIGNIFICANT CHANGE UP (ref 6–8.3)
PROT SERPL-MCNC: 6.2 G/DL — SIGNIFICANT CHANGE UP (ref 6–8.3)
PROTHROM AB SERPL-ACNC: 12.2 SEC — SIGNIFICANT CHANGE UP (ref 9.5–13)
PROTHROM AB SERPL-ACNC: 12.2 SEC — SIGNIFICANT CHANGE UP (ref 9.5–13)
RBC # BLD: 3.72 M/UL — LOW (ref 3.8–5.2)
RBC # BLD: 3.72 M/UL — LOW (ref 3.8–5.2)
RBC # BLD: 3.83 M/UL — SIGNIFICANT CHANGE UP (ref 3.8–5.2)
RBC # BLD: 3.83 M/UL — SIGNIFICANT CHANGE UP (ref 3.8–5.2)
RBC # FLD: 12.9 % — SIGNIFICANT CHANGE UP (ref 10.3–14.5)
RBC # FLD: 12.9 % — SIGNIFICANT CHANGE UP (ref 10.3–14.5)
RBC # FLD: 13 % — SIGNIFICANT CHANGE UP (ref 10.3–14.5)
RBC # FLD: 13 % — SIGNIFICANT CHANGE UP (ref 10.3–14.5)
RH IG SCN BLD-IMP: POSITIVE — SIGNIFICANT CHANGE UP
RH IG SCN BLD-IMP: POSITIVE — SIGNIFICANT CHANGE UP
SODIUM SERPL-SCNC: 137 MMOL/L — SIGNIFICANT CHANGE UP (ref 135–145)
SODIUM SERPL-SCNC: 137 MMOL/L — SIGNIFICANT CHANGE UP (ref 135–145)
WBC # BLD: 11.56 K/UL — HIGH (ref 3.8–10.5)
WBC # BLD: 11.56 K/UL — HIGH (ref 3.8–10.5)
WBC # BLD: 13.19 K/UL — HIGH (ref 3.8–10.5)
WBC # BLD: 13.19 K/UL — HIGH (ref 3.8–10.5)
WBC # FLD AUTO: 11.56 K/UL — HIGH (ref 3.8–10.5)
WBC # FLD AUTO: 11.56 K/UL — HIGH (ref 3.8–10.5)
WBC # FLD AUTO: 13.19 K/UL — HIGH (ref 3.8–10.5)
WBC # FLD AUTO: 13.19 K/UL — HIGH (ref 3.8–10.5)

## 2024-01-12 PROCEDURE — 61512 CRNEC TREPH EXC MNGIOMA STTL: CPT

## 2024-01-12 PROCEDURE — 88311 DECALCIFY TISSUE: CPT | Mod: 26

## 2024-01-12 PROCEDURE — 88360 TUMOR IMMUNOHISTOCHEM/MANUAL: CPT | Mod: 26

## 2024-01-12 PROCEDURE — 99232 SBSQ HOSP IP/OBS MODERATE 35: CPT

## 2024-01-12 PROCEDURE — 88307 TISSUE EXAM BY PATHOLOGIST: CPT | Mod: 26

## 2024-01-12 PROCEDURE — 88342 IMHCHEM/IMCYTCHM 1ST ANTB: CPT | Mod: 26,59

## 2024-01-12 PROCEDURE — 61781 SCAN PROC CRANIAL INTRA: CPT

## 2024-01-12 DEVICE — GRAFT DURAGEN PLUS 3X3IN: Type: IMPLANTABLE DEVICE | Site: LEFT | Status: FUNCTIONAL

## 2024-01-12 DEVICE — PLATE COVER BURRHOLE UN3 W/TAB 10MM: Type: IMPLANTABLE DEVICE | Site: LEFT | Status: FUNCTIONAL

## 2024-01-12 DEVICE — SCREW UN3 AXS SELF DRILL 1.5X4MM: Type: IMPLANTABLE DEVICE | Site: LEFT | Status: FUNCTIONAL

## 2024-01-12 DEVICE — PLATE UN3 2 HOLE RIGID: Type: IMPLANTABLE DEVICE | Site: LEFT | Status: FUNCTIONAL

## 2024-01-12 DEVICE — SURGICEL 2 X 14": Type: IMPLANTABLE DEVICE | Site: LEFT | Status: FUNCTIONAL

## 2024-01-12 DEVICE — SURGIFLO MATRIX WITH THROMBIN KIT: Type: IMPLANTABLE DEVICE | Site: LEFT | Status: FUNCTIONAL

## 2024-01-12 DEVICE — MAYFIELD SKULL PIN ADULT PLASTIC: Type: IMPLANTABLE DEVICE | Site: LEFT | Status: FUNCTIONAL

## 2024-01-12 DEVICE — SURGIFOAM PAD 8CM X 12.5CM X 10MM (100): Type: IMPLANTABLE DEVICE | Site: LEFT | Status: FUNCTIONAL

## 2024-01-12 DEVICE — SURGICEL FIBRILLAR 2 X 4": Type: IMPLANTABLE DEVICE | Site: LEFT | Status: FUNCTIONAL

## 2024-01-12 DEVICE — KIT A-LINE 1LUM 20G X 12CM SAFE KIT: Type: IMPLANTABLE DEVICE | Site: LEFT | Status: FUNCTIONAL

## 2024-01-12 RX ORDER — ACETAMINOPHEN 500 MG
1000 TABLET ORAL ONCE
Refills: 0 | Status: COMPLETED | OUTPATIENT
Start: 2024-01-12 | End: 2024-01-12

## 2024-01-12 RX ORDER — LEVETIRACETAM 250 MG/1
500 TABLET, FILM COATED ORAL EVERY 12 HOURS
Refills: 0 | Status: DISCONTINUED | OUTPATIENT
Start: 2024-01-12 | End: 2024-01-16

## 2024-01-12 RX ORDER — LEVOTHYROXINE SODIUM 125 MCG
175 TABLET ORAL DAILY
Refills: 0 | Status: DISCONTINUED | OUTPATIENT
Start: 2024-01-12 | End: 2024-01-12

## 2024-01-12 RX ORDER — SODIUM CHLORIDE 9 MG/ML
1000 INJECTION INTRAMUSCULAR; INTRAVENOUS; SUBCUTANEOUS
Refills: 0 | Status: DISCONTINUED | OUTPATIENT
Start: 2024-01-12 | End: 2024-01-13

## 2024-01-12 RX ORDER — SODIUM CHLORIDE 9 MG/ML
1000 INJECTION, SOLUTION INTRAVENOUS
Refills: 0 | Status: DISCONTINUED | OUTPATIENT
Start: 2024-01-12 | End: 2024-01-12

## 2024-01-12 RX ORDER — ALBUTEROL 90 UG/1
2 AEROSOL, METERED ORAL EVERY 6 HOURS
Refills: 0 | Status: DISCONTINUED | OUTPATIENT
Start: 2024-01-12 | End: 2024-01-12

## 2024-01-12 RX ORDER — OLOPATADINE HYDROCHLORIDE 665 UG/1
2 SPRAY, METERED NASAL
Refills: 0 | DISCHARGE

## 2024-01-12 RX ORDER — CEFAZOLIN SODIUM 1 G
2000 VIAL (EA) INJECTION ONCE
Refills: 0 | Status: COMPLETED | OUTPATIENT
Start: 2024-01-12 | End: 2024-01-12

## 2024-01-12 RX ORDER — POLYETHYLENE GLYCOL 3350 17 G/17G
17 POWDER, FOR SOLUTION ORAL DAILY
Refills: 0 | Status: DISCONTINUED | OUTPATIENT
Start: 2024-01-12 | End: 2024-01-16

## 2024-01-12 RX ORDER — INSULIN LISPRO 100/ML
VIAL (ML) SUBCUTANEOUS
Refills: 0 | Status: DISCONTINUED | OUTPATIENT
Start: 2024-01-12 | End: 2024-01-14

## 2024-01-12 RX ORDER — ONDANSETRON 8 MG/1
4 TABLET, FILM COATED ORAL ONCE
Refills: 0 | Status: COMPLETED | OUTPATIENT
Start: 2024-01-12 | End: 2024-01-12

## 2024-01-12 RX ORDER — OXYCODONE HYDROCHLORIDE 5 MG/1
5 TABLET ORAL EVERY 4 HOURS
Refills: 0 | Status: DISCONTINUED | OUTPATIENT
Start: 2024-01-12 | End: 2024-01-16

## 2024-01-12 RX ORDER — HYDROMORPHONE HYDROCHLORIDE 2 MG/ML
0.5 INJECTION INTRAMUSCULAR; INTRAVENOUS; SUBCUTANEOUS
Refills: 0 | Status: DISCONTINUED | OUTPATIENT
Start: 2024-01-12 | End: 2024-01-12

## 2024-01-12 RX ORDER — ACETAMINOPHEN 500 MG
650 TABLET ORAL EVERY 6 HOURS
Refills: 0 | Status: DISCONTINUED | OUTPATIENT
Start: 2024-01-12 | End: 2024-01-16

## 2024-01-12 RX ORDER — LIDOCAINE HCL 20 MG/ML
0.2 VIAL (ML) INJECTION ONCE
Refills: 0 | Status: DISCONTINUED | OUTPATIENT
Start: 2024-01-12 | End: 2024-01-12

## 2024-01-12 RX ORDER — OXYCODONE HYDROCHLORIDE 5 MG/1
10 TABLET ORAL EVERY 4 HOURS
Refills: 0 | Status: DISCONTINUED | OUTPATIENT
Start: 2024-01-12 | End: 2024-01-16

## 2024-01-12 RX ORDER — CEFAZOLIN SODIUM 1 G
2000 VIAL (EA) INJECTION EVERY 8 HOURS
Refills: 0 | Status: COMPLETED | OUTPATIENT
Start: 2024-01-12 | End: 2024-01-13

## 2024-01-12 RX ORDER — SENNA PLUS 8.6 MG/1
1 TABLET ORAL AT BEDTIME
Refills: 0 | Status: DISCONTINUED | OUTPATIENT
Start: 2024-01-12 | End: 2024-01-16

## 2024-01-12 RX ORDER — ZINC ACETATE DIHYDRATE 100 %
1 CRYSTALS MISCELLANEOUS
Refills: 0 | DISCHARGE

## 2024-01-12 RX ORDER — NORETHINDRONE AND ETHINYL ESTRADIOL 0.4-0.035
1 KIT ORAL
Qty: 0 | Refills: 0 | DISCHARGE

## 2024-01-12 RX ORDER — ALBUTEROL 90 UG/1
2 AEROSOL, METERED ORAL
Refills: 0 | DISCHARGE

## 2024-01-12 RX ORDER — FAMOTIDINE 10 MG/ML
20 INJECTION INTRAVENOUS DAILY
Refills: 0 | Status: DISCONTINUED | OUTPATIENT
Start: 2024-01-12 | End: 2024-01-12

## 2024-01-12 RX ORDER — PANTOPRAZOLE SODIUM 20 MG/1
40 TABLET, DELAYED RELEASE ORAL
Refills: 0 | Status: DISCONTINUED | OUTPATIENT
Start: 2024-01-12 | End: 2024-01-16

## 2024-01-12 RX ORDER — DEXAMETHASONE 0.5 MG/5ML
4 ELIXIR ORAL EVERY 6 HOURS
Refills: 0 | Status: DISCONTINUED | OUTPATIENT
Start: 2024-01-12 | End: 2024-01-15

## 2024-01-12 RX ORDER — ACETAMINOPHEN 500 MG
1000 TABLET ORAL ONCE
Refills: 0 | Status: COMPLETED | OUTPATIENT
Start: 2024-01-12 | End: 2024-01-13

## 2024-01-12 RX ORDER — CHLORHEXIDINE GLUCONATE 213 G/1000ML
1 SOLUTION TOPICAL DAILY
Refills: 0 | Status: DISCONTINUED | OUTPATIENT
Start: 2024-01-12 | End: 2024-01-16

## 2024-01-12 RX ORDER — PANTOPRAZOLE SODIUM 20 MG/1
40 TABLET, DELAYED RELEASE ORAL
Refills: 0 | Status: DISCONTINUED | OUTPATIENT
Start: 2024-01-12 | End: 2024-01-12

## 2024-01-12 RX ADMIN — Medication 4 MILLIGRAM(S): at 23:11

## 2024-01-12 RX ADMIN — SODIUM CHLORIDE 75 MILLILITER(S): 9 INJECTION INTRAMUSCULAR; INTRAVENOUS; SUBCUTANEOUS at 13:45

## 2024-01-12 RX ADMIN — OXYCODONE HYDROCHLORIDE 5 MILLIGRAM(S): 5 TABLET ORAL at 18:39

## 2024-01-12 RX ADMIN — Medication 1000 MILLIGRAM(S): at 20:17

## 2024-01-12 RX ADMIN — HYDROMORPHONE HYDROCHLORIDE 0.5 MILLIGRAM(S): 2 INJECTION INTRAMUSCULAR; INTRAVENOUS; SUBCUTANEOUS at 14:15

## 2024-01-12 RX ADMIN — HYDROMORPHONE HYDROCHLORIDE 0.5 MILLIGRAM(S): 2 INJECTION INTRAMUSCULAR; INTRAVENOUS; SUBCUTANEOUS at 14:31

## 2024-01-12 RX ADMIN — SODIUM CHLORIDE 75 MILLILITER(S): 9 INJECTION INTRAMUSCULAR; INTRAVENOUS; SUBCUTANEOUS at 19:42

## 2024-01-12 RX ADMIN — Medication 100 MILLIGRAM(S): at 20:19

## 2024-01-12 RX ADMIN — ONDANSETRON 4 MILLIGRAM(S): 8 TABLET, FILM COATED ORAL at 15:58

## 2024-01-12 RX ADMIN — HYDROMORPHONE HYDROCHLORIDE 0.5 MILLIGRAM(S): 2 INJECTION INTRAMUSCULAR; INTRAVENOUS; SUBCUTANEOUS at 14:01

## 2024-01-12 RX ADMIN — Medication 4 MILLIGRAM(S): at 17:14

## 2024-01-12 RX ADMIN — Medication 400 MILLIGRAM(S): at 19:43

## 2024-01-12 RX ADMIN — LEVETIRACETAM 500 MILLIGRAM(S): 250 TABLET, FILM COATED ORAL at 17:14

## 2024-01-12 RX ADMIN — HYDROMORPHONE HYDROCHLORIDE 0.5 MILLIGRAM(S): 2 INJECTION INTRAMUSCULAR; INTRAVENOUS; SUBCUTANEOUS at 13:45

## 2024-01-12 RX ADMIN — OXYCODONE HYDROCHLORIDE 5 MILLIGRAM(S): 5 TABLET ORAL at 18:09

## 2024-01-12 NOTE — PATIENT PROFILE ADULT - FALL HARM RISK - UNIVERSAL INTERVENTIONS
Bed in lowest position, wheels locked, appropriate side rails in place/Call bell, personal items and telephone in reach/Instruct patient to call for assistance before getting out of bed or chair/Non-slip footwear when patient is out of bed/[place] to call system/Physically safe environment - no spills, clutter or unnecessary equipment/Purposeful Proactive Rounding/Room/bathroom lighting operational, light cord in reach Bed in lowest position, wheels locked, appropriate side rails in place/Call bell, personal items and telephone in reach/Instruct patient to call for assistance before getting out of bed or chair/Non-slip footwear when patient is out of bed/[place] to call system/Physically safe environment - no spills, clutter or unnecessary equipment/Purposeful Proactive Rounding/Room/bathroom lighting operational, light cord in reach

## 2024-01-12 NOTE — PROGRESS NOTE ADULT - TIME BILLING
POD 0 from  Left Craniotomy Removal Meningioma resection    neurochecks q 1hr  decadron 4 mg q 6 hr for vasogenic edema  keppra 500 mg BID for seizure prophylaxis  CT head tomorrow  MRI brain wwo   follow official path   RA, IVL once tolerating PO intake, PPI while on decadron, sugar goal 120-180

## 2024-01-12 NOTE — PRE-ANESTHESIA EVALUATION ADULT - NSANTHPROCED_GEN_ALL_CORE
I reviewed the H&P, I examined the patient, and there are no changes in the patient's condition.    Julius Jones MD    
Arterial Catheter

## 2024-01-12 NOTE — PROGRESS NOTE ADULT - SUBJECTIVE AND OBJECTIVE BOX
HPI:  43 year old female presents to PST prior to scheduled Left Craniotomy Removal Meningioma with Dr. Perez on 1/12/2024. PMhx thyroid cancer, status post partial thyroidectomy 12 years ago, childhood asthma, status post COVID-19 infection June 2022 and received monoclonal antibody infusion. PShx cholecystectomy, removal of fibroid x2. C/o headaches, vertigo-like symptoms for close to a year. She went into a sauna approximately a month ago, felt dizzy lightheaded increased heart rate and had transient loss of consciousness. Patient had an MRI and was found to have a left-sided meningioma. Denies any chest pain, palpitation at this, denies SOB, N/V, fever or chills.    Of note: pt had thyroidectomy 12 years ago s/p radioactive Iodine. 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 She denies any choking or aspiration but states she has to eat in very small bites slowly. (pt was discussed with Dr. Erickson) RAMIREZ note in chart. Pt. also reports recent palpitation episode in 2022 and 2023, she was seen by Dr. Tellez wore halter monitor for one week (12/22/23-12/27/23). As per pt no abnormal results. (28 Dec 2023 07:13)    SURGERY: Craniotomy for resection of tumor of left side of brain              PHYSICAL EXAM:    General: No Acute Distress     Neurological: Awake, alert oriented to person, place and time, Following Commands, PERRL, EOMI, no facial drooP, Speech Fluent but some hesitancy? Moving all extremities, Muscle Strength normal in all four extremities, No Drift, Sensation to Light Touch Intact    Pulmonary: Clear to Auscultation, No Rales, No Rhonchi, No Wheezes     Cardiovascular: S1, S2, Regular Rate and Rhythm     Gastrointestinal: Soft, Nontender, Nondistended     Extremities: No calf tenderness     Incision:

## 2024-01-12 NOTE — CHART NOTE - NSCHARTNOTEFT_GEN_A_CORE
CAPRINI SCORE [CLOT] Score on Admission for     AGE RELATED RISK FACTORS                                                       MOBILITY RELATED FACTORS  [ x] Age 41-60 years                                            (1 Point)                  [ ] Bed rest                                                        (1 Point)  [ ] Age: 61-74 years                                           (2 Points)                 [ ] Plaster cast                                                   (2 Points)  [ ] Age= 75 years                                              (3 Points)                 [ ] Bed bound for more than 72 hours                 (2 Points)    DISEASE RELATED RISK FACTORS                                               GENDER SPECIFIC FACTORS  [ ] Edema in the lower extremities                       (1 Point)                  [ ] Pregnancy                                                     (1 Point)  [ ] Varicose veins                                               (1 Point)                  [ ] Post-partum < 6 weeks                                   (1 Point)             [ ] BMI > 25 Kg/m2                                            (1 Point)                  [ ] Hormonal therapy  or oral contraception          (1 Point)                 [ ] Sepsis (in the previous month)                        (1 Point)                  [ ] History of pregnancy complications                 (1 point)  [ ] Pneumonia or serious lung disease                                               [ ] Unexplained or recurrent                     (1 Point)           (in the previous month)                               (1 Point)  [ ] Abnormal pulmonary function test                     (1 Point)                 SURGERY RELATED RISK FACTORS (include planned surgeries)  [ ] Acute myocardial infarction                              (1 Point)                 [ ]  Section                                             (1 Point)  [ ] Congestive heart failure (in the previous month)  (1 Point)         [ ] Minor surgery                                                  (1 Point)   [ ] Inflammatory bowel disease                             (1 Point)                 [ ] Arthroscopic surgery                                        (2 Points)  [ ] Central venous access                                      (2 Points)                [ x] General surgery lasting more than 45 minutes   (2 Points)       [ ] Stroke (in the previous month)                          (5 Points)               [ ] Elective arthroplasty                                         (5 Points)            [x ] current or past malignancy                              (2 Points)                                                                                                       HEMATOLOGY RELATED FACTORS                                                 TRAUMA RELATED RISK FACTORS  [ ] Prior episodes of VTE                                     (3 Points)                [ ] Fracture of the hip, pelvis, or leg                       (5 Points)  [ ] Positive family history for VTE                         (3 Points)                 [ ] Acute spinal cord injury (in the previous month)  (5 Points)  [ ] Prothrombin 52805 A                                     (3 Points)                 [ ] Paralysis  (less than 1 month)                             (5 Points)  [ ] Factor V Leiden                                             (3 Points)                  [ ] Multiple Trauma within 1 month                        (5 Points)  [ ] Lupus anticoagulants                                     (3 Points)                                                           [ ] Anticardiolipin antibodies                               (3 Points)                                                       [ ] High homocysteine in the blood                      (3 Points)                                             [ ] Other congenital or acquired thrombophilia      (3 Points)                                                [ ] Heparin induced thrombocytopenia                  (3 Points)                                          Total Score [     5     ]    Risk:  Very low 0   Low 1 to 2   Moderate 3 to 4   High =5       VTE Prophylasix Recommednations:  [ x] mechanical pneumatic compression devices                                      [ ] contraindicated: _____________________  [ ] chemo prophylasix                                                                                   [x ] contraindicated _____________________    **** HIGH LIKELIHOOD DVT PRESENT ON ADMISSION  [ x] (please order LE dopplers within 24 hours of admission) CAPRINI SCORE [CLOT] Score on Admission for     AGE RELATED RISK FACTORS                                                       MOBILITY RELATED FACTORS  [ x] Age 41-60 years                                            (1 Point)                  [ ] Bed rest                                                        (1 Point)  [ ] Age: 61-74 years                                           (2 Points)                 [ ] Plaster cast                                                   (2 Points)  [ ] Age= 75 years                                              (3 Points)                 [ ] Bed bound for more than 72 hours                 (2 Points)    DISEASE RELATED RISK FACTORS                                               GENDER SPECIFIC FACTORS  [ ] Edema in the lower extremities                       (1 Point)                  [ ] Pregnancy                                                     (1 Point)  [ ] Varicose veins                                               (1 Point)                  [ ] Post-partum < 6 weeks                                   (1 Point)             [ ] BMI > 25 Kg/m2                                            (1 Point)                  [ ] Hormonal therapy  or oral contraception          (1 Point)                 [ ] Sepsis (in the previous month)                        (1 Point)                  [ ] History of pregnancy complications                 (1 point)  [ ] Pneumonia or serious lung disease                                               [ ] Unexplained or recurrent                     (1 Point)           (in the previous month)                               (1 Point)  [ ] Abnormal pulmonary function test                     (1 Point)                 SURGERY RELATED RISK FACTORS (include planned surgeries)  [ ] Acute myocardial infarction                              (1 Point)                 [ ]  Section                                             (1 Point)  [ ] Congestive heart failure (in the previous month)  (1 Point)         [ ] Minor surgery                                                  (1 Point)   [ ] Inflammatory bowel disease                             (1 Point)                 [ ] Arthroscopic surgery                                        (2 Points)  [ ] Central venous access                                      (2 Points)                [ x] General surgery lasting more than 45 minutes   (2 Points)       [ ] Stroke (in the previous month)                          (5 Points)               [ ] Elective arthroplasty                                         (5 Points)            [x ] current or past malignancy                              (2 Points)                                                                                                       HEMATOLOGY RELATED FACTORS                                                 TRAUMA RELATED RISK FACTORS  [ ] Prior episodes of VTE                                     (3 Points)                [ ] Fracture of the hip, pelvis, or leg                       (5 Points)  [ ] Positive family history for VTE                         (3 Points)                 [ ] Acute spinal cord injury (in the previous month)  (5 Points)  [ ] Prothrombin 27193 A                                     (3 Points)                 [ ] Paralysis  (less than 1 month)                             (5 Points)  [ ] Factor V Leiden                                             (3 Points)                  [ ] Multiple Trauma within 1 month                        (5 Points)  [ ] Lupus anticoagulants                                     (3 Points)                                                           [ ] Anticardiolipin antibodies                               (3 Points)                                                       [ ] High homocysteine in the blood                      (3 Points)                                             [ ] Other congenital or acquired thrombophilia      (3 Points)                                                [ ] Heparin induced thrombocytopenia                  (3 Points)                                          Total Score [     5     ]    Risk:  Very low 0   Low 1 to 2   Moderate 3 to 4   High =5       VTE Prophylasix Recommednations:  [ x] mechanical pneumatic compression devices                                      [ ] contraindicated: _____________________  [ ] chemo prophylasix                                                                                   [x ] contraindicated _____________________    **** HIGH LIKELIHOOD DVT PRESENT ON ADMISSION  [ x] (please order LE dopplers within 24 hours of admission)

## 2024-01-12 NOTE — PROGRESS NOTE ADULT - ASSESSMENT
A/P:  43 year old female POD 0 from  Left Craniotomy Removal Meningioma resection     Please refer to the detailed plan from this AM. No changes made.

## 2024-01-12 NOTE — BRIEF OPERATIVE NOTE - NSICDXBRIEFPROCEDURE_GEN_ALL_CORE_FT
PROCEDURES:  Craniotomy for resection of tumor of left side of brain 12-Jan-2024 07:46:24  Jessee Malagon

## 2024-01-12 NOTE — PROGRESS NOTE ADULT - SUBJECTIVE AND OBJECTIVE BOX
HPI:  43 year old female presents to PST prior to scheduled Left Craniotomy Removal Meningioma with Dr. Perez on 1/12/2024. PMhx thyroid cancer, status post partial thyroidectomy 12 years ago, childhood asthma, status post COVID-19 infection June 2022 and received monoclonal antibody infusion. PShx cholecystectomy, removal of fibroid x2. C/o headaches, vertigo-like symptoms for close to a year. She went into a sauna approximately a month ago, felt dizzy lightheaded increased heart rate and had transient loss of consciousness. Patient had an MRI and was found to have a left-sided meningioma. Denies any chest pain, palpitation at this, denies SOB, N/V, fever or chills.    Of note: pt had thyroidectomy 12 years ago s/p radioactive Iodine. 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 She denies any choking or aspiration but states she has to eat in very small bites slowly. (pt was discussed with Dr. Erickson) RAMIREZ note in chart. Pt. also reports recent palpitation episode in 2022 and 2023, she was seen by Dr. Tellez wore halter monitor for one week (12/22/23-12/27/23). As per pt no abnormal results. (28 Dec 2023 07:13)    SURGERY: Craniotomy for resection of tumor of left side of brain      ICU Vital Signs Last 24 Hrs  T(C): 37 (12 Jan 2024 13:15), Max: 37 (12 Jan 2024 13:15)  T(F): 98.6 (12 Jan 2024 13:15), Max: 98.6 (12 Jan 2024 13:15)  HR: 85 (12 Jan 2024 13:30) (80 - 98)  BP: 112/69 (12 Jan 2024 13:30) (112/69 - 134/88)  BP(mean): 86 (12 Jan 2024 13:30) (84 - 86)  ABP: 113/62 (12 Jan 2024 13:30) (113/62 - 124/69)  ABP(mean): 89 (12 Jan 2024 13:30) (89 - 93)  RR: 18 (12 Jan 2024 13:15) (18 - 18)  SpO2: 99% (12 Jan 2024 13:30) (98% - 100%)    O2 Parameters below as of 12 Jan 2024 13:15  Patient On (Oxygen Delivery Method): nasal cannula  O2 Flow (L/min): 2         01-12 @ 07:01  -  01-12 @ 14:27  --------------------------------------------------------  IN: 0 mL / OUT: 235 mL / NET: -235 mL                             11.0   11.56 )-----------( 260      ( 12 Jan 2024 13:49 )             32.4    01-12    137  |  104  |  11  ----------------------------<  146<H>  3.9   |  22  |  0.80    Ca    7.0<L>      12 Jan 2024 13:49    TPro  6.2  /  Alb  3.7  /  TBili  0.3  /  DBili  x   /  AST  20  /  ALT  31  /  AlkPhos  35<L>  01-12            PHYSICAL EXAM:    General: No Acute Distress     Neurological: Awake, alert oriented to person, place and time, Following Commands, PERRL, EOMI, no facial droop  , Speech Fluent, Moving all extremities, Muscle Strength normal in all four extremities, No Drift, Sensation to Light Touch Intact    Pulmonary: Clear to Auscultation, No Rales, No Rhonchi, No Wheezes     Cardiovascular: S1, S2, Regular Rate and Rhythm     Gastrointestinal: Soft, Nontender, Nondistended     Extremities: No calf tenderness     Incision:       MEDICATIONS:  Antibiotics:      Neurological:   acetaminophen     Tablet .. 650 milliGRAM(s) Oral every 6 hours PRN  HYDROmorphone  Injectable 0.5 milliGRAM(s) IV Push every 10 minutes PRN  levETIRAcetam 500 milliGRAM(s) Oral every 12 hours  ondansetron Injectable 4 milliGRAM(s) IV Push once PRN  oxyCODONE    IR 10 milliGRAM(s) Oral every 4 hours PRN  oxyCODONE    IR 5 milliGRAM(s) Oral every 4 hours PRN    Cardiac:     Pulm:    Heme:     Other:   dexAMETHasone     Tablet 4 milliGRAM(s) Oral every 6 hours  sodium chloride 0.9%. 1000 milliLiter(s) IV Continuous <Continuous>       DEVICES: [] Restraints [] SEKOU/HMV []LD [] ET tube [] Trach [] Chest Tube [] A-line [] Encarnacion [] NGT [] Rectal Tube       A/P:  43 year old female POD 0 from  Left Craniotomy Removal Meningioma resection     Neuro: neurochecks q 1hr  decadron 4 mg q 6 hr for vasogenic edema  keppra 500 mg BID for seizure prophylaxis  CT head tomorrow  MRI brain wwo   follow official path   Respiratory:  RA   CV: SBP goal 100-160 mmhg  Endocrine: finger sticks q 6 hr, ISS , target sugar 140-180   Heme/Onc:       ml  cbc pending       DVT ppx: SCD, hold chemoprophylaxis given POD 0   Renal: NS 75 ml/hr, IVL once tolerating PO intake   ID: ethel-op ABX   GI: advance diet as tolerated   Discharge planning:     Code Status: [x] Full Code [] DNR [] DNI [] Goals of Care:   Disposition: [x] ICU [] Stroke Unit [] RCU []PCU []Floor [] Discharge Home     Patient at high risk for neurologic deterioration, critical care time, excluding procedures: 30 minutes

## 2024-01-13 LAB
ANION GAP SERPL CALC-SCNC: 12 MMOL/L — SIGNIFICANT CHANGE UP (ref 5–17)
ANION GAP SERPL CALC-SCNC: 12 MMOL/L — SIGNIFICANT CHANGE UP (ref 5–17)
BUN SERPL-MCNC: 11 MG/DL — SIGNIFICANT CHANGE UP (ref 7–23)
BUN SERPL-MCNC: 11 MG/DL — SIGNIFICANT CHANGE UP (ref 7–23)
CALCIUM SERPL-MCNC: 7.2 MG/DL — LOW (ref 8.4–10.5)
CALCIUM SERPL-MCNC: 7.2 MG/DL — LOW (ref 8.4–10.5)
CHLORIDE SERPL-SCNC: 106 MMOL/L — SIGNIFICANT CHANGE UP (ref 96–108)
CHLORIDE SERPL-SCNC: 106 MMOL/L — SIGNIFICANT CHANGE UP (ref 96–108)
CO2 SERPL-SCNC: 21 MMOL/L — LOW (ref 22–31)
CO2 SERPL-SCNC: 21 MMOL/L — LOW (ref 22–31)
CREAT SERPL-MCNC: 0.71 MG/DL — SIGNIFICANT CHANGE UP (ref 0.5–1.3)
CREAT SERPL-MCNC: 0.71 MG/DL — SIGNIFICANT CHANGE UP (ref 0.5–1.3)
EGFR: 107 ML/MIN/1.73M2 — SIGNIFICANT CHANGE UP
EGFR: 107 ML/MIN/1.73M2 — SIGNIFICANT CHANGE UP
GLUCOSE BLDC GLUCOMTR-MCNC: 148 MG/DL — HIGH (ref 70–99)
GLUCOSE BLDC GLUCOMTR-MCNC: 151 MG/DL — HIGH (ref 70–99)
GLUCOSE BLDC GLUCOMTR-MCNC: 151 MG/DL — HIGH (ref 70–99)
GLUCOSE SERPL-MCNC: 133 MG/DL — HIGH (ref 70–99)
GLUCOSE SERPL-MCNC: 133 MG/DL — HIGH (ref 70–99)
HCT VFR BLD CALC: 29.9 % — LOW (ref 34.5–45)
HCT VFR BLD CALC: 29.9 % — LOW (ref 34.5–45)
HGB BLD-MCNC: 9.9 G/DL — LOW (ref 11.5–15.5)
HGB BLD-MCNC: 9.9 G/DL — LOW (ref 11.5–15.5)
MAGNESIUM SERPL-MCNC: 2.1 MG/DL — SIGNIFICANT CHANGE UP (ref 1.6–2.6)
MAGNESIUM SERPL-MCNC: 2.1 MG/DL — SIGNIFICANT CHANGE UP (ref 1.6–2.6)
MCHC RBC-ENTMCNC: 28.8 PG — SIGNIFICANT CHANGE UP (ref 27–34)
MCHC RBC-ENTMCNC: 28.8 PG — SIGNIFICANT CHANGE UP (ref 27–34)
MCHC RBC-ENTMCNC: 33.1 GM/DL — SIGNIFICANT CHANGE UP (ref 32–36)
MCHC RBC-ENTMCNC: 33.1 GM/DL — SIGNIFICANT CHANGE UP (ref 32–36)
MCV RBC AUTO: 86.9 FL — SIGNIFICANT CHANGE UP (ref 80–100)
MCV RBC AUTO: 86.9 FL — SIGNIFICANT CHANGE UP (ref 80–100)
NRBC # BLD: 0 /100 WBCS — SIGNIFICANT CHANGE UP (ref 0–0)
NRBC # BLD: 0 /100 WBCS — SIGNIFICANT CHANGE UP (ref 0–0)
PHOSPHATE SERPL-MCNC: 5.1 MG/DL — HIGH (ref 2.5–4.5)
PHOSPHATE SERPL-MCNC: 5.1 MG/DL — HIGH (ref 2.5–4.5)
PLATELET # BLD AUTO: 249 K/UL — SIGNIFICANT CHANGE UP (ref 150–400)
PLATELET # BLD AUTO: 249 K/UL — SIGNIFICANT CHANGE UP (ref 150–400)
POTASSIUM SERPL-MCNC: 3.9 MMOL/L — SIGNIFICANT CHANGE UP (ref 3.5–5.3)
POTASSIUM SERPL-MCNC: 3.9 MMOL/L — SIGNIFICANT CHANGE UP (ref 3.5–5.3)
POTASSIUM SERPL-SCNC: 3.9 MMOL/L — SIGNIFICANT CHANGE UP (ref 3.5–5.3)
POTASSIUM SERPL-SCNC: 3.9 MMOL/L — SIGNIFICANT CHANGE UP (ref 3.5–5.3)
RBC # BLD: 3.44 M/UL — LOW (ref 3.8–5.2)
RBC # BLD: 3.44 M/UL — LOW (ref 3.8–5.2)
RBC # FLD: 12.7 % — SIGNIFICANT CHANGE UP (ref 10.3–14.5)
RBC # FLD: 12.7 % — SIGNIFICANT CHANGE UP (ref 10.3–14.5)
SODIUM SERPL-SCNC: 139 MMOL/L — SIGNIFICANT CHANGE UP (ref 135–145)
SODIUM SERPL-SCNC: 139 MMOL/L — SIGNIFICANT CHANGE UP (ref 135–145)
WBC # BLD: 14.26 K/UL — HIGH (ref 3.8–10.5)
WBC # BLD: 14.26 K/UL — HIGH (ref 3.8–10.5)
WBC # FLD AUTO: 14.26 K/UL — HIGH (ref 3.8–10.5)
WBC # FLD AUTO: 14.26 K/UL — HIGH (ref 3.8–10.5)

## 2024-01-13 PROCEDURE — 70450 CT HEAD/BRAIN W/O DYE: CPT | Mod: 26

## 2024-01-13 PROCEDURE — 93970 EXTREMITY STUDY: CPT | Mod: 26

## 2024-01-13 PROCEDURE — 99232 SBSQ HOSP IP/OBS MODERATE 35: CPT

## 2024-01-13 RX ORDER — ENOXAPARIN SODIUM 100 MG/ML
40 INJECTION SUBCUTANEOUS
Refills: 0 | Status: DISCONTINUED | OUTPATIENT
Start: 2024-01-13 | End: 2024-01-14

## 2024-01-13 RX ORDER — NORETHINDRONE AND ETHINYL ESTRADIOL 0.4-0.035
1 KIT ORAL DAILY
Refills: 0 | Status: DISCONTINUED | OUTPATIENT
Start: 2024-01-13 | End: 2024-01-13

## 2024-01-13 RX ORDER — ACETAMINOPHEN 500 MG
1000 TABLET ORAL ONCE
Refills: 0 | Status: COMPLETED | OUTPATIENT
Start: 2024-01-13 | End: 2024-01-14

## 2024-01-13 RX ORDER — LEVOTHYROXINE SODIUM 125 MCG
175 TABLET ORAL DAILY
Refills: 0 | Status: DISCONTINUED | OUTPATIENT
Start: 2024-01-13 | End: 2024-01-16

## 2024-01-13 RX ORDER — ONDANSETRON 8 MG/1
4 TABLET, FILM COATED ORAL ONCE
Refills: 0 | Status: COMPLETED | OUTPATIENT
Start: 2024-01-13 | End: 2024-01-13

## 2024-01-13 RX ORDER — HYDROMORPHONE HYDROCHLORIDE 2 MG/ML
2 INJECTION INTRAMUSCULAR; INTRAVENOUS; SUBCUTANEOUS EVERY 6 HOURS
Refills: 0 | Status: DISCONTINUED | OUTPATIENT
Start: 2024-01-13 | End: 2024-01-14

## 2024-01-13 RX ORDER — POTASSIUM CHLORIDE 20 MEQ
20 PACKET (EA) ORAL ONCE
Refills: 0 | Status: COMPLETED | OUTPATIENT
Start: 2024-01-13 | End: 2024-01-13

## 2024-01-13 RX ORDER — CALCITRIOL 0.5 UG/1
0.5 CAPSULE ORAL DAILY
Refills: 0 | Status: DISCONTINUED | OUTPATIENT
Start: 2024-01-13 | End: 2024-01-16

## 2024-01-13 RX ORDER — ACETAMINOPHEN 500 MG
1000 TABLET ORAL ONCE
Refills: 0 | Status: COMPLETED | OUTPATIENT
Start: 2024-01-13 | End: 2024-01-13

## 2024-01-13 RX ADMIN — HYDROMORPHONE HYDROCHLORIDE 2 MILLIGRAM(S): 2 INJECTION INTRAMUSCULAR; INTRAVENOUS; SUBCUTANEOUS at 21:30

## 2024-01-13 RX ADMIN — CHLORHEXIDINE GLUCONATE 1 APPLICATION(S): 213 SOLUTION TOPICAL at 12:22

## 2024-01-13 RX ADMIN — Medication 650 MILLIGRAM(S): at 20:15

## 2024-01-13 RX ADMIN — Medication 175 MICROGRAM(S): at 08:57

## 2024-01-13 RX ADMIN — Medication 650 MILLIGRAM(S): at 19:47

## 2024-01-13 RX ADMIN — Medication 650 MILLIGRAM(S): at 15:03

## 2024-01-13 RX ADMIN — Medication 20 MILLIEQUIVALENT(S): at 03:21

## 2024-01-13 RX ADMIN — OXYCODONE HYDROCHLORIDE 5 MILLIGRAM(S): 5 TABLET ORAL at 15:45

## 2024-01-13 RX ADMIN — Medication 4 MILLIGRAM(S): at 17:49

## 2024-01-13 RX ADMIN — Medication 4 MILLIGRAM(S): at 11:54

## 2024-01-13 RX ADMIN — Medication 4 MILLIGRAM(S): at 06:04

## 2024-01-13 RX ADMIN — PANTOPRAZOLE SODIUM 40 MILLIGRAM(S): 20 TABLET, DELAYED RELEASE ORAL at 06:05

## 2024-01-13 RX ADMIN — POLYETHYLENE GLYCOL 3350 17 GRAM(S): 17 POWDER, FOR SOLUTION ORAL at 11:54

## 2024-01-13 RX ADMIN — OXYCODONE HYDROCHLORIDE 5 MILLIGRAM(S): 5 TABLET ORAL at 10:40

## 2024-01-13 RX ADMIN — OXYCODONE HYDROCHLORIDE 5 MILLIGRAM(S): 5 TABLET ORAL at 16:15

## 2024-01-13 RX ADMIN — SENNA PLUS 1 TABLET(S): 8.6 TABLET ORAL at 23:04

## 2024-01-13 RX ADMIN — Medication 100 MILLIGRAM(S): at 03:45

## 2024-01-13 RX ADMIN — LEVETIRACETAM 500 MILLIGRAM(S): 250 TABLET, FILM COATED ORAL at 06:04

## 2024-01-13 RX ADMIN — Medication 2: at 16:42

## 2024-01-13 RX ADMIN — Medication 650 MILLIGRAM(S): at 14:33

## 2024-01-13 RX ADMIN — ENOXAPARIN SODIUM 40 MILLIGRAM(S): 100 INJECTION SUBCUTANEOUS at 17:49

## 2024-01-13 RX ADMIN — HYDROMORPHONE HYDROCHLORIDE 2 MILLIGRAM(S): 2 INJECTION INTRAMUSCULAR; INTRAVENOUS; SUBCUTANEOUS at 21:00

## 2024-01-13 RX ADMIN — OXYCODONE HYDROCHLORIDE 10 MILLIGRAM(S): 5 TABLET ORAL at 00:05

## 2024-01-13 RX ADMIN — CALCITRIOL 0.5 MICROGRAM(S): 0.5 CAPSULE ORAL at 10:10

## 2024-01-13 RX ADMIN — Medication 1000 MILLIGRAM(S): at 09:18

## 2024-01-13 RX ADMIN — OXYCODONE HYDROCHLORIDE 5 MILLIGRAM(S): 5 TABLET ORAL at 11:10

## 2024-01-13 RX ADMIN — ONDANSETRON 4 MILLIGRAM(S): 8 TABLET, FILM COATED ORAL at 09:55

## 2024-01-13 RX ADMIN — LEVETIRACETAM 500 MILLIGRAM(S): 250 TABLET, FILM COATED ORAL at 17:50

## 2024-01-13 RX ADMIN — Medication 400 MILLIGRAM(S): at 03:30

## 2024-01-13 RX ADMIN — OXYCODONE HYDROCHLORIDE 10 MILLIGRAM(S): 5 TABLET ORAL at 00:35

## 2024-01-13 RX ADMIN — Medication 1000 MILLIGRAM(S): at 03:41

## 2024-01-13 RX ADMIN — Medication 400 MILLIGRAM(S): at 08:48

## 2024-01-13 RX ADMIN — ONDANSETRON 4 MILLIGRAM(S): 8 TABLET, FILM COATED ORAL at 16:06

## 2024-01-13 NOTE — PROGRESS NOTE ADULT - ATTENDING COMMENTS
Pt seen and examined.  at bedside  Doing well  Awake, alert follows commands well. Speech fluent with minor hesistancy when trying to speak too fast  GAMA well  Wound C & D    CT - post-op changes. no sig heme    Doing well POD#1    Cont' post-op care  OOB and ambulate w/ assistance  MRI pending  D/c hemovac tomorrow

## 2024-01-13 NOTE — PROGRESS NOTE ADULT - ASSESSMENT
43 year old female presents to PST prior to scheduled Left Craniotomy Removal Meningioma with Dr. Perez on 1/12/2024. PMhx thyroid cancer, status post partial thyroidectomy 12 years ago, childhood asthma, status post COVID-19 infection June 2022 and received monoclonal antibody infusion. PShx cholecystectomy, removal of fibroid x2. C/o headaches, vertigo-like symptoms for close to a year. She went into a sauna approximately a month ago, felt dizzy lightheaded increased heart rate and had transient loss of consciousness. Patient had an MRI and was found to have a left-sided meningioma. Denies any chest pain, palpitation at this, denies SOB, N/V, fever or chills.    Now s/p L crani for meningioma     CTH in AM-if stable can go to floor  MRI w/wout  LED-p  F/u H/H

## 2024-01-13 NOTE — PROGRESS NOTE ADULT - SUBJECTIVE AND OBJECTIVE BOX
HPI:  43 year old female presents to PST prior to scheduled Left Craniotomy Removal Meningioma with Dr. Perez on 1/12/2024. PMhx thyroid cancer, status post partial thyroidectomy 12 years ago, childhood asthma, status post COVID-19 infection June 2022 and received monoclonal antibody infusion. PShx cholecystectomy, removal of fibroid x2. C/o headaches, vertigo-like symptoms for close to a year. She went into a sauna approximately a month ago, felt dizzy lightheaded increased heart rate and had transient loss of consciousness. Patient had an MRI and was found to have a left-sided meningioma. Denies any chest pain, palpitation at this, denies SOB, N/V, fever or chills.    Of note: pt had thyroidectomy 12 years ago s/p radioactive Iodine. 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 She denies any choking or aspiration but states she has to eat in very small bites slowly. (pt was discussed with Dr. Erickson) RAMIREZ note in chart. Pt. also reports recent palpitation episode in 2022 and 2023, she was seen by Dr. Tellez wore halter monitor for one week (12/22/23-12/27/23). As per pt no abnormal results. (28 Dec 2023 07:13)    1/12 Craniotomy for resection of tumor of left side of brain  unchanged exam overnight    ICU Vital Signs Last 24 Hrs  T(C): 36.5 (13 Jan 2024 04:00), Max: 37 (12 Jan 2024 13:15)  T(F): 97.7 (13 Jan 2024 04:00), Max: 98.6 (12 Jan 2024 13:15)  HR: 78 (13 Jan 2024 07:00) (73 - 98)  BP: 121/70 (13 Jan 2024 07:00) (107/67 - 146/75)  BP(mean): 90 (13 Jan 2024 07:00) (82 - 105)  ABP: 129/67 (13 Jan 2024 06:00) (65/63 - 154/57)  ABP(mean): 92 (13 Jan 2024 06:00) (65 - 108)  RR: 16 (13 Jan 2024 07:00) (16 - 18)  SpO2: 99% (13 Jan 2024 07:00) (96% - 100%)    O2 Parameters below as of 13 Jan 2024 07:00  Patient On (Oxygen Delivery Method): room air    CBC:            9.9    14.26 )-----------( 249      ( 01-13-24 @ 03:59 )             29.9         Chem:         ( 01-12-24 @ 23:28 )    139  |  106  |  11  ----------------------------<  133<H>  3.9   |  21<L>  |  0.71        Liver Functions: ( 01-12-24 @ 13:49 )  Alb: 3.7 g/dL / Pro: 6.2 g/dL / ALK PHOS: 35 U/L / ALT: 31 U/L / AST: 20 U/L / GGT: x              Type & Screen: ( 01-12-24 @ 21:10 )    ABO/Rh/Antonio:  A Positive     MEDICATIONS  (STANDING):  acetaminophen   IVPB .. 1000 milliGRAM(s) IV Intermittent once  calcitriol   Capsule 0.5 MICROGram(s) Oral daily  chlorhexidine 4% Liquid 1 Application(s) Topical daily  dexAMETHasone     Tablet 4 milliGRAM(s) Oral every 6 hours  insulin lispro (ADMELOG) corrective regimen sliding scale   SubCutaneous three times a day before meals  levETIRAcetam 500 milliGRAM(s) Oral every 12 hours  levothyroxine 175 MICROGram(s) Oral daily  pantoprazole    Tablet 40 milliGRAM(s) Oral before breakfast  polyethylene glycol 3350 17 Gram(s) Oral daily  senna 1 Tablet(s) Oral at bedtime    MEDICATIONS  (PRN):  acetaminophen     Tablet .. 650 milliGRAM(s) Oral every 6 hours PRN Temp greater or equal to 38C (100.4F), Mild Pain (1 - 3)  oxyCODONE    IR 10 milliGRAM(s) Oral every 4 hours PRN Severe Pain (7 - 10)  oxyCODONE    IR 5 milliGRAM(s) Oral every 4 hours PRN Moderate Pain (4 - 6)      PHYSICAL EXAM:    General: No Acute Distress     Neurological: Awake, alert oriented to person, place and time, Following Commands, PERRL, EOMI, symmetric face, Speech Fluent but some hesitancy? Moving all extremities, Muscle Strength normal in all four extremities, No Drift, Sensation to Light Touch Intact    Pulmonary: Clear to Auscultation, No Rales, No Rhonchi, No Wheezes     Cardiovascular: S1, S2, Regular Rate and Rhythm     Gastrointestinal: Soft, Nontender, Nondistended     Extremities: No calf tenderness     Incision:

## 2024-01-13 NOTE — PROGRESS NOTE ADULT - ASSESSMENT
ASSESSMENT/PLAN: POD 1 craniotomy for tumor resection    NEURO:  post op AM CTH  Q4hr checks  decadron for cerebral edema  keppra for seizure ppx  pain control  Activity: [x] mobilize as tolerated [] Bedrest [x] PT [x] OT [] PMNR    PULM:  keep O2sat>92%  incentive spirometry as able    CV:  SBP goal 100-160    RENAL:  Fluids: IVL    GI:  Diet: regular diet  GI prophylaxis [] not indicated [x] PPI with decadron [] other:  Bowel regimen [] colace [x] senna [x] other: miralax     ENDO:   Goal euglycemia (-180)  JAIME  cont home dose synthroid     HEME/ONC:  VTE prophylaxis: [x] SCDs [] chemoprophylaxis [x] hold chemoprophylaxis due to: fresh post op [x] high risk of DVT/PE on admission due to: tumor    ID: monitor for fevers    MISC:    SOCIAL/FAMILY:  [x] awaiting [] updated at bedside [] family meeting    CODE STATUS:  [x] Full Code [] DNR [] DNI [] Palliative/Comfort Care    DISPOSITION:  pending am CTH poss transfer to floor  35min spent

## 2024-01-13 NOTE — PROGRESS NOTE ADULT - SUBJECTIVE AND OBJECTIVE BOX
Patient seen and examined at bedside.    --Anticoagulation--    T(C): 36.2 (01-13-24 @ 08:00), Max: 37 (01-12-24 @ 13:15)  HR: 90 (01-13-24 @ 08:00) (73 - 98)  BP: 141/74 (01-13-24 @ 08:00) (107/67 - 146/75)  RR: 16 (01-13-24 @ 08:00) (16 - 18)  SpO2: 98% (01-13-24 @ 08:00) (96% - 100%)  Wt(kg): --    Exam: AOx3, PERRL, Mild left nasalabial flattening, speech hesitancy but fluent, GAMA 5/5

## 2024-01-14 LAB
ANION GAP SERPL CALC-SCNC: 12 MMOL/L — SIGNIFICANT CHANGE UP (ref 5–17)
ANION GAP SERPL CALC-SCNC: 12 MMOL/L — SIGNIFICANT CHANGE UP (ref 5–17)
BUN SERPL-MCNC: 11 MG/DL — SIGNIFICANT CHANGE UP (ref 7–23)
BUN SERPL-MCNC: 11 MG/DL — SIGNIFICANT CHANGE UP (ref 7–23)
CALCIUM SERPL-MCNC: 6.7 MG/DL — LOW (ref 8.4–10.5)
CALCIUM SERPL-MCNC: 6.7 MG/DL — LOW (ref 8.4–10.5)
CHLORIDE SERPL-SCNC: 102 MMOL/L — SIGNIFICANT CHANGE UP (ref 96–108)
CHLORIDE SERPL-SCNC: 102 MMOL/L — SIGNIFICANT CHANGE UP (ref 96–108)
CO2 SERPL-SCNC: 24 MMOL/L — SIGNIFICANT CHANGE UP (ref 22–31)
CO2 SERPL-SCNC: 24 MMOL/L — SIGNIFICANT CHANGE UP (ref 22–31)
CREAT SERPL-MCNC: 0.69 MG/DL — SIGNIFICANT CHANGE UP (ref 0.5–1.3)
CREAT SERPL-MCNC: 0.69 MG/DL — SIGNIFICANT CHANGE UP (ref 0.5–1.3)
EGFR: 109 ML/MIN/1.73M2 — SIGNIFICANT CHANGE UP
EGFR: 109 ML/MIN/1.73M2 — SIGNIFICANT CHANGE UP
GLUCOSE BLDC GLUCOMTR-MCNC: 130 MG/DL — HIGH (ref 70–99)
GLUCOSE BLDC GLUCOMTR-MCNC: 130 MG/DL — HIGH (ref 70–99)
GLUCOSE SERPL-MCNC: 146 MG/DL — HIGH (ref 70–99)
GLUCOSE SERPL-MCNC: 146 MG/DL — HIGH (ref 70–99)
HCT VFR BLD CALC: 30.9 % — LOW (ref 34.5–45)
HCT VFR BLD CALC: 30.9 % — LOW (ref 34.5–45)
HGB BLD-MCNC: 10.3 G/DL — LOW (ref 11.5–15.5)
HGB BLD-MCNC: 10.3 G/DL — LOW (ref 11.5–15.5)
MCHC RBC-ENTMCNC: 29 PG — SIGNIFICANT CHANGE UP (ref 27–34)
MCHC RBC-ENTMCNC: 29 PG — SIGNIFICANT CHANGE UP (ref 27–34)
MCHC RBC-ENTMCNC: 33.3 GM/DL — SIGNIFICANT CHANGE UP (ref 32–36)
MCHC RBC-ENTMCNC: 33.3 GM/DL — SIGNIFICANT CHANGE UP (ref 32–36)
MCV RBC AUTO: 87 FL — SIGNIFICANT CHANGE UP (ref 80–100)
MCV RBC AUTO: 87 FL — SIGNIFICANT CHANGE UP (ref 80–100)
NRBC # BLD: 0 /100 WBCS — SIGNIFICANT CHANGE UP (ref 0–0)
NRBC # BLD: 0 /100 WBCS — SIGNIFICANT CHANGE UP (ref 0–0)
PLATELET # BLD AUTO: 284 K/UL — SIGNIFICANT CHANGE UP (ref 150–400)
PLATELET # BLD AUTO: 284 K/UL — SIGNIFICANT CHANGE UP (ref 150–400)
POTASSIUM SERPL-MCNC: 3.7 MMOL/L — SIGNIFICANT CHANGE UP (ref 3.5–5.3)
POTASSIUM SERPL-MCNC: 3.7 MMOL/L — SIGNIFICANT CHANGE UP (ref 3.5–5.3)
POTASSIUM SERPL-SCNC: 3.7 MMOL/L — SIGNIFICANT CHANGE UP (ref 3.5–5.3)
POTASSIUM SERPL-SCNC: 3.7 MMOL/L — SIGNIFICANT CHANGE UP (ref 3.5–5.3)
RBC # BLD: 3.55 M/UL — LOW (ref 3.8–5.2)
RBC # BLD: 3.55 M/UL — LOW (ref 3.8–5.2)
RBC # FLD: 12.9 % — SIGNIFICANT CHANGE UP (ref 10.3–14.5)
RBC # FLD: 12.9 % — SIGNIFICANT CHANGE UP (ref 10.3–14.5)
SODIUM SERPL-SCNC: 138 MMOL/L — SIGNIFICANT CHANGE UP (ref 135–145)
SODIUM SERPL-SCNC: 138 MMOL/L — SIGNIFICANT CHANGE UP (ref 135–145)
WBC # BLD: 17.09 K/UL — HIGH (ref 3.8–10.5)
WBC # BLD: 17.09 K/UL — HIGH (ref 3.8–10.5)
WBC # FLD AUTO: 17.09 K/UL — HIGH (ref 3.8–10.5)
WBC # FLD AUTO: 17.09 K/UL — HIGH (ref 3.8–10.5)

## 2024-01-14 PROCEDURE — 70553 MRI BRAIN STEM W/O & W/DYE: CPT | Mod: 26

## 2024-01-14 RX ORDER — POTASSIUM CHLORIDE 20 MEQ
40 PACKET (EA) ORAL ONCE
Refills: 0 | Status: COMPLETED | OUTPATIENT
Start: 2024-01-14 | End: 2024-01-14

## 2024-01-14 RX ORDER — ALPRAZOLAM 0.25 MG
0.25 TABLET ORAL ONCE
Refills: 0 | Status: DISCONTINUED | OUTPATIENT
Start: 2024-01-14 | End: 2024-01-14

## 2024-01-14 RX ORDER — HYDROMORPHONE HYDROCHLORIDE 2 MG/ML
0.2 INJECTION INTRAMUSCULAR; INTRAVENOUS; SUBCUTANEOUS ONCE
Refills: 0 | Status: DISCONTINUED | OUTPATIENT
Start: 2024-01-14 | End: 2024-01-14

## 2024-01-14 RX ORDER — CALCIUM GLUCONATE 100 MG/ML
2 VIAL (ML) INTRAVENOUS ONCE
Refills: 0 | Status: COMPLETED | OUTPATIENT
Start: 2024-01-14 | End: 2024-01-14

## 2024-01-14 RX ORDER — ENOXAPARIN SODIUM 100 MG/ML
40 INJECTION SUBCUTANEOUS
Refills: 0 | Status: DISCONTINUED | OUTPATIENT
Start: 2024-01-14 | End: 2024-01-16

## 2024-01-14 RX ORDER — ACETAMINOPHEN 500 MG
1000 TABLET ORAL ONCE
Refills: 0 | Status: COMPLETED | OUTPATIENT
Start: 2024-01-14 | End: 2024-01-14

## 2024-01-14 RX ADMIN — HYDROMORPHONE HYDROCHLORIDE 0.2 MILLIGRAM(S): 2 INJECTION INTRAMUSCULAR; INTRAVENOUS; SUBCUTANEOUS at 17:45

## 2024-01-14 RX ADMIN — Medication 650 MILLIGRAM(S): at 23:03

## 2024-01-14 RX ADMIN — SENNA PLUS 1 TABLET(S): 8.6 TABLET ORAL at 22:32

## 2024-01-14 RX ADMIN — PANTOPRAZOLE SODIUM 40 MILLIGRAM(S): 20 TABLET, DELAYED RELEASE ORAL at 06:23

## 2024-01-14 RX ADMIN — Medication 4 MILLIGRAM(S): at 06:21

## 2024-01-14 RX ADMIN — Medication 650 MILLIGRAM(S): at 22:33

## 2024-01-14 RX ADMIN — Medication 4 MILLIGRAM(S): at 22:33

## 2024-01-14 RX ADMIN — Medication 650 MILLIGRAM(S): at 16:50

## 2024-01-14 RX ADMIN — Medication 175 MICROGRAM(S): at 06:23

## 2024-01-14 RX ADMIN — Medication 4 MILLIGRAM(S): at 17:15

## 2024-01-14 RX ADMIN — ENOXAPARIN SODIUM 40 MILLIGRAM(S): 100 INJECTION SUBCUTANEOUS at 22:33

## 2024-01-14 RX ADMIN — HYDROMORPHONE HYDROCHLORIDE 0.2 MILLIGRAM(S): 2 INJECTION INTRAMUSCULAR; INTRAVENOUS; SUBCUTANEOUS at 17:15

## 2024-01-14 RX ADMIN — Medication 1000 MILLIGRAM(S): at 02:00

## 2024-01-14 RX ADMIN — LEVETIRACETAM 500 MILLIGRAM(S): 250 TABLET, FILM COATED ORAL at 17:15

## 2024-01-14 RX ADMIN — Medication 650 MILLIGRAM(S): at 16:20

## 2024-01-14 RX ADMIN — Medication 400 MILLIGRAM(S): at 01:40

## 2024-01-14 RX ADMIN — Medication 4 MILLIGRAM(S): at 00:05

## 2024-01-14 RX ADMIN — LEVETIRACETAM 500 MILLIGRAM(S): 250 TABLET, FILM COATED ORAL at 06:15

## 2024-01-14 RX ADMIN — Medication 200 GRAM(S): at 10:27

## 2024-01-14 RX ADMIN — Medication 1000 MILLIGRAM(S): at 09:00

## 2024-01-14 RX ADMIN — Medication 400 MILLIGRAM(S): at 08:30

## 2024-01-14 RX ADMIN — Medication 0.25 MILLIGRAM(S): at 14:33

## 2024-01-14 RX ADMIN — Medication 40 MILLIEQUIVALENT(S): at 10:00

## 2024-01-14 RX ADMIN — CALCITRIOL 0.5 MICROGRAM(S): 0.5 CAPSULE ORAL at 06:36

## 2024-01-14 RX ADMIN — Medication 4 MILLIGRAM(S): at 11:45

## 2024-01-14 RX ADMIN — HYDROMORPHONE HYDROCHLORIDE 2 MILLIGRAM(S): 2 INJECTION INTRAMUSCULAR; INTRAVENOUS; SUBCUTANEOUS at 09:00

## 2024-01-14 RX ADMIN — HYDROMORPHONE HYDROCHLORIDE 2 MILLIGRAM(S): 2 INJECTION INTRAMUSCULAR; INTRAVENOUS; SUBCUTANEOUS at 08:20

## 2024-01-14 NOTE — PHYSICAL THERAPY INITIAL EVALUATION ADULT - IMPAIRED TRANSFERS: SIT/STAND, REHAB EVAL
Specialty Pharmacy - Refill Coordination  Specialty Pharmacy - Clinical Reassessment    Specialty Medication Orders Linked to Encounter    Flowsheet Row Most Recent Value   Medication #1 abatacept (ORENCIA CLICKJECT) 125 mg/mL AtIn (Order#[id number], Rx#[id number])        Patient Diagnosis   M06.9 - Rheumatoid arthritis, involving unspecified site, unspecified whether rheumatoid factor present    Specialty clinical pharmacist review completed for an annual review of reassessment. Reviewed the following areas: current med list, reports of adverse effects, adherence and progress towards therapeutic goals.    Recommendations: none at this time.    Tasks added this encounter   5/24/2023 - Clinical - Follow Up Assesement (Annual)   Tasks due within next 3 months   8/24/2022 - Refill Call (Auto Added)     Nic Gustafson, PharmD  [facility]  [address]  [facility] [address]  Phone: [phone]  Fax: [phone]     impaired balance/decreased strength

## 2024-01-14 NOTE — PROGRESS NOTE ADULT - SUBJECTIVE AND OBJECTIVE BOX
SUBJECTIVE: HPI:  43 year old female presents to PST prior to scheduled Left Craniotomy Removal Meningioma with Dr. Perez on 1/12/2024. PMhx thyroid cancer, status post partial thyroidectomy 12 years ago, childhood asthma, status post COVID-19 infection June 2022 and received monoclonal antibody infusion. PShx cholecystectomy, removal of fibroid x2. C/o headaches, vertigo-like symptoms for close to a year. She went into a sauna approximately a month ago, felt dizzy lightheaded increased heart rate and had transient loss of consciousness. Patient had an MRI and was found to have a left-sided meningioma. Denies any chest pain, palpitation at this, denies SOB, N/V, fever or chills.    Of note: pt had thyroidectomy 12 years ago s/p radioactive Iodine. 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 She denies any choking or aspiration but states she has to eat in very small bites slowly. (pt was discussed with Dr. Erickson) RAMIREZ note in chart. Pt. also reports recent palpitation episode in 2022 and 2023, she was seen by Dr. Tellez wore halter monitor for one week (12/22/23-12/27/23). As per pt no abnormal results. (28 Dec 2023 07:13)      OVERNIGHT EVENTS: Patient transitioned from [facility] care to [facility] overnight, seen and evaluated in PACU this morning awaiting floor bed. Reports headache earlier but better after IV Tylenol, currently tolerating PO diet.     Vital Signs Last 24 Hrs  T(C): 36.2 (14 Jan 2024 04:00), Max: 36.4 (13 Jan 2024 19:30)  T(F): 97.2 (14 Jan 2024 04:00), Max: 97.5 (13 Jan 2024 19:30)  HR: 89 (14 Jan 2024 09:00) (66 - 108)  BP: 133/70 (14 Jan 2024 09:00) (118/68 - 140/81)  BP(mean): 93 (14 Jan 2024 09:00) (87 - 104)  RR: 16 (14 Jan 2024 09:00) (16 - 17)  SpO2: 98% (14 Jan 2024 09:00) (96% - 100%)    Parameters below as of 14 Jan 2024 09:00  Patient On (Oxygen Delivery Method): room air        PHYSICAL EXAM:    General: No Acute Distress     Neurological: Awake, OX3 (name, place, date), PERRL, EOMI, no drift, following commands, no drift, uppers 5/5, lowers 5/5, SILT    Pulmonary: Clear to Auscultation, No Rales, No Rhonchi, No Wheezes     Cardiovascular: S1, S2, Regular Rate and Rhythm     Gastrointestinal: Soft, Nontender, Nondistended     Incision: Left craniotomy incision has surgical staples in place, has bacitracin and telfa C/D/I, with left SG SEKOU in place intact    LABS:                        10.3   17.09 )-----------( 284      ( 14 Jan 2024 08:26 )             30.9    01-14    138  |  102  |  11  ----------------------------<  146<H>  3.7   |  24  |  0.69    Ca    6.7<L>      14 Jan 2024 08:26  Phos  5.1     01-12  Mg     2.1     01-12    TPro  6.2  /  Alb  3.7  /  TBili  0.3  /  DBili  x   /  AST  20  /  ALT  31  /  AlkPhos  35<L>  01-12  PT/INR - ( 12 Jan 2024 23:28 )   PT: 12.2 sec;   INR: 1.17 ratio         PTT - ( 12 Jan 2024 23:28 )  PTT:23.5 sec      01-13 @ 07:01 - 01-14 @ 07:00  --------------------------------------------------------  IN: 2670 mL / OUT: 3545 mL / NET: -875 mL    01-14 @ 07:01 - 01-14 @ 10:07  --------------------------------------------------------  IN: 0 mL / OUT: 35 mL / NET: -35 mL      DRAINS: left SG SEKOU (225cc/24hrs)    MEDICATIONS:  Antibiotics:    Neuro:  acetaminophen     Tablet .. 650 milliGRAM(s) Oral every 6 hours PRN Temp greater or equal to 38C (100.4F), Mild Pain (1 - 3)  levETIRAcetam 500 milliGRAM(s) Oral every 12 hours  oxyCODONE    IR 10 milliGRAM(s) Oral every 4 hours PRN Severe Pain (7 - 10)  oxyCODONE    IR 5 milliGRAM(s) Oral every 4 hours PRN Moderate Pain (4 - 6)    Cardiac:    Pulm:    GI/:  pantoprazole    Tablet 40 milliGRAM(s) Oral before breakfast  polyethylene glycol 3350 17 Gram(s) Oral daily  senna 1 Tablet(s) Oral at bedtime    Other:   calcitriol   Capsule 0.5 MICROGram(s) Oral daily  calcium gluconate IVPB 2 Gram(s) IV Intermittent once  chlorhexidine 4% Liquid 1 Application(s) Topical daily  dexAMETHasone     Tablet 4 milliGRAM(s) Oral every 6 hours  enoxaparin Injectable 40 milliGRAM(s) SubCutaneous <User Schedule>  insulin lispro (ADMELOG) corrective regimen sliding scale   SubCutaneous three times a day before meals  Junel Fe 1.5/30 28 days 1 Tablet(s) 1 Tablet(s) Oral daily  levothyroxine 175 MICROGram(s) Oral daily    DIET: [x] Regular [] CCD [] Renal [] Puree [] Dysphagia [] Tube Feeds:     IMAGING:   < from: CT Head No Cont (01.13.24 @ 09:37) >  IMPRESSION:  Status post left frontal craniotomy and resection of the left extra-axial   lesion with postoperative changes including pneumocephalus, hemorrhage,   and edema within the left frontal lobe. Small residual dense tissue   within the postoperative bed, possibly residual tumor. Further evaluation   can be obtained with MRI with IV contrast after immediate postoperative   changes have resolved.    Postoperative drain within the left frontal scalp.      Findings regarding possible residual tumor were discussed by Dr. Conway   with Dr. Delarosa on 1/13/2024 at 2:13 PM with read back confirmation.    --- End of Report ---      JESS CONWAY MD; Resident Radiologist  This document has been electronically signed.  ANEUDY PEREZ MD; Attending Radiologist  This document has been electronically signed. Jan 13 2024  3:05PM    < end of copied text >   SUBJECTIVE: HPI:  43 year old female presents to PST prior to scheduled Left Craniotomy Removal Meningioma with Dr. Perez on 1/12/2024. PMhx thyroid cancer, status post partial thyroidectomy 12 years ago, childhood asthma, status post COVID-19 infection June 2022 and received monoclonal antibody infusion. PShx cholecystectomy, removal of fibroid x2. C/o headaches, vertigo-like symptoms for close to a year. She went into a sauna approximately a month ago, felt dizzy lightheaded increased heart rate and had transient loss of consciousness. Patient had an MRI and was found to have a left-sided meningioma. Denies any chest pain, palpitation at this, denies SOB, N/V, fever or chills.    Of note: pt had thyroidectomy 12 years ago s/p radioactive Iodine. 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 She denies any choking or aspiration but states she has to eat in very small bites slowly. (pt was discussed with Dr. Erickson) RAMIREZ note in chart. Pt. also reports recent palpitation episode in 2022 and 2023, she was seen by Dr. Tellez wore halter monitor for one week (12/22/23-12/27/23). As per pt no abnormal results. (28 Dec 2023 07:13)      OVERNIGHT EVENTS: Patient transitioned from [facility] care to [facility] overnight, seen and evaluated in PACU this morning awaiting floor bed. Reports headache earlier but better after IV Tylenol, currently tolerating PO diet.     Vital Signs Last 24 Hrs  T(C): 36.2 (14 Jan 2024 04:00), Max: 36.4 (13 Jan 2024 19:30)  T(F): 97.2 (14 Jan 2024 04:00), Max: 97.5 (13 Jan 2024 19:30)  HR: 89 (14 Jan 2024 09:00) (66 - 108)  BP: 133/70 (14 Jan 2024 09:00) (118/68 - 140/81)  BP(mean): 93 (14 Jan 2024 09:00) (87 - 104)  RR: 16 (14 Jan 2024 09:00) (16 - 17)  SpO2: 98% (14 Jan 2024 09:00) (96% - 100%)    Parameters below as of 14 Jan 2024 09:00  Patient On (Oxygen Delivery Method): room air        PHYSICAL EXAM:    General: No Acute Distress     Neurological: Awake, OX3 (name, place, date), PERRL, EOMI, no drift, following commands, no drift, uppers 5/5, lowers 5/5, SILT    Pulmonary: Clear to Auscultation, No Rales, No Rhonchi, No Wheezes     Cardiovascular: S1, S2, Regular Rate and Rhythm     Gastrointestinal: Soft, Nontender, Nondistended     Incision: Left craniotomy incision has surgical staples in place, has bacitracin and telfa C/D/I, with left SG SEKOU in place intact    LABS:                        10.3   17.09 )-----------( 284      ( 14 Jan 2024 08:26 )             30.9    01-14    138  |  102  |  11  ----------------------------<  146<H>  3.7   |  24  |  0.69    Ca    6.7<L>      14 Jan 2024 08:26  Phos  5.1     01-12  Mg     2.1     01-12    TPro  6.2  /  Alb  3.7  /  TBili  0.3  /  DBili  x   /  AST  20  /  ALT  31  /  AlkPhos  35<L>  01-12  PT/INR - ( 12 Jan 2024 23:28 )   PT: 12.2 sec;   INR: 1.17 ratio         PTT - ( 12 Jan 2024 23:28 )  PTT:23.5 sec      01-13 @ 07:01 - 01-14 @ 07:00  --------------------------------------------------------  IN: 2670 mL / OUT: 3545 mL / NET: -875 mL    01-14 @ 07:01 - 01-14 @ 10:07  --------------------------------------------------------  IN: 0 mL / OUT: 35 mL / NET: -35 mL      DRAINS: left SG SEKOU (225cc/24hrs)    MEDICATIONS:  Antibiotics:    Neuro:  acetaminophen     Tablet .. 650 milliGRAM(s) Oral every 6 hours PRN Temp greater or equal to 38C (100.4F), Mild Pain (1 - 3)  levETIRAcetam 500 milliGRAM(s) Oral every 12 hours  oxyCODONE    IR 10 milliGRAM(s) Oral every 4 hours PRN Severe Pain (7 - 10)  oxyCODONE    IR 5 milliGRAM(s) Oral every 4 hours PRN Moderate Pain (4 - 6)    Cardiac:    Pulm:    GI/:  pantoprazole    Tablet 40 milliGRAM(s) Oral before breakfast  polyethylene glycol 3350 17 Gram(s) Oral daily  senna 1 Tablet(s) Oral at bedtime    Other:   calcitriol   Capsule 0.5 MICROGram(s) Oral daily  calcium gluconate IVPB 2 Gram(s) IV Intermittent once  chlorhexidine 4% Liquid 1 Application(s) Topical daily  dexAMETHasone     Tablet 4 milliGRAM(s) Oral every 6 hours  enoxaparin Injectable 40 milliGRAM(s) SubCutaneous <User Schedule>  insulin lispro (ADMELOG) corrective regimen sliding scale   SubCutaneous three times a day before meals  Junel Fe 1.5/30 28 days 1 Tablet(s) 1 Tablet(s) Oral daily  levothyroxine 175 MICROGram(s) Oral daily    DIET: [x] Regular [] CCD [] Renal [] Puree [] Dysphagia [] Tube Feeds:     IMAGING:   < from: CT Head No Cont (01.13.24 @ 09:37) >  IMPRESSION:  Status post left frontal craniotomy and resection of the left extra-axial   lesion with postoperative changes including pneumocephalus, hemorrhage,   and edema within the left frontal lobe. Small residual dense tissue   within the postoperative bed, possibly residual tumor. Further evaluation   can be obtained with MRI with IV contrast after immediate postoperative   changes have resolved.    Postoperative drain within the left frontal scalp.      Findings regarding possible residual tumor were discussed by Dr. Conway   with Dr. Delarosa on 1/13/2024 at 2:13 PM with read back confirmation.    --- End of Report ---      JESS CONWAY MD; Resident Radiologist  This document has been electronically signed.  ANEUDY PEREZ MD; Attending Radiologist  This document has been electronically signed. Jan 13 2024  3:05PM    < end of copied text >   SUBJECTIVE: HPI:  43 year old female presents to PST prior to scheduled Left Craniotomy Removal Meningioma with Dr. Perez on 1/12/2024. PMhx thyroid cancer, status post partial thyroidectomy 12 years ago, childhood asthma, status post COVID-19 infection June 2022 and received monoclonal antibody infusion. PShx cholecystectomy, removal of fibroid x2. C/o headaches, vertigo-like symptoms for close to a year. She went into a sauna approximately a month ago, felt dizzy lightheaded increased heart rate and had transient loss of consciousness. Patient had an MRI and was found to have a left-sided meningioma. Denies any chest pain, palpitation at this, denies SOB, N/V, fever or chills.    Of note: pt had thyroidectomy 12 years ago s/p radioactive Iodine. 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 She denies any choking or aspiration but states she has to eat in very small bites slowly. (pt was discussed with Dr. Erickson) RAMIREZ note in chart. Pt. also reports recent palpitation episode in 2022 and 2023, she was seen by Dr. Tellez wore halter monitor for one week (12/22/23-12/27/23). As per pt no abnormal results. (28 Dec 2023 07:13)      OVERNIGHT EVENTS: Patient transitioned from [facility] care to [facility] overnight, seen and evaluated in PACU this morning awaiting floor bed. Reports headache earlier but better after IV Tylenol, currently tolerating PO diet.     Vital Signs Last 24 Hrs  T(C): 36.2 (14 Jan 2024 04:00), Max: 36.4 (13 Jan 2024 19:30)  T(F): 97.2 (14 Jan 2024 04:00), Max: 97.5 (13 Jan 2024 19:30)  HR: 89 (14 Jan 2024 09:00) (66 - 108)  BP: 133/70 (14 Jan 2024 09:00) (118/68 - 140/81)  BP(mean): 93 (14 Jan 2024 09:00) (87 - 104)  RR: 16 (14 Jan 2024 09:00) (16 - 17)  SpO2: 98% (14 Jan 2024 09:00) (96% - 100%)    Parameters below as of 14 Jan 2024 09:00  Patient On (Oxygen Delivery Method): room air        PHYSICAL EXAM:    General: No Acute Distress     Neurological: Awake, OX3 (name, place, date), PERRL, EOMI, no drift, following commands, no drift, uppers 5/5, lowers 5/5, SILT    Pulmonary: Clear to Auscultation, No Rales, No Rhonchi, No Wheezes     Cardiovascular: S1, S2, Regular Rate and Rhythm     Gastrointestinal: Soft, Nontender, Nondistended     Incision: Left craniotomy incision has surgical staples in place, has bacitracin and telfa C/D/I, with left SG SEKOU in place intact    LABS:                        10.3   17.09 )-----------( 284      ( 14 Jan 2024 08:26 )             30.9    01-14    138  |  102  |  11  ----------------------------<  146<H>  3.7   |  24  |  0.69    Ca    6.7<L>      14 Jan 2024 08:26  Phos  5.1     01-12  Mg     2.1     01-12    TPro  6.2  /  Alb  3.7  /  TBili  0.3  /  DBili  x   /  AST  20  /  ALT  31  /  AlkPhos  35<L>  01-12  PT/INR - ( 12 Jan 2024 23:28 )   PT: 12.2 sec;   INR: 1.17 ratio         PTT - ( 12 Jan 2024 23:28 )  PTT:23.5 sec      01-13 @ 07:01 - 01-14 @ 07:00  --------------------------------------------------------  IN: 2670 mL / OUT: 3545 mL / NET: -875 mL    01-14 @ 07:01 - 01-14 @ 10:07  --------------------------------------------------------  IN: 0 mL / OUT: 35 mL / NET: -35 mL      DRAINS: left SG SEKOU (225cc/24hrs)    MEDICATIONS:  Antibiotics:    Neuro:  acetaminophen     Tablet .. 650 milliGRAM(s) Oral every 6 hours PRN Temp greater or equal to 38C (100.4F), Mild Pain (1 - 3)  levETIRAcetam 500 milliGRAM(s) Oral every 12 hours  oxyCODONE    IR 10 milliGRAM(s) Oral every 4 hours PRN Severe Pain (7 - 10)  oxyCODONE    IR 5 milliGRAM(s) Oral every 4 hours PRN Moderate Pain (4 - 6)    Cardiac:    Pulm:    GI/:  pantoprazole    Tablet 40 milliGRAM(s) Oral before breakfast  polyethylene glycol 3350 17 Gram(s) Oral daily  senna 1 Tablet(s) Oral at bedtime    Other:   calcitriol   Capsule 0.5 MICROGram(s) Oral daily  calcium gluconate IVPB 2 Gram(s) IV Intermittent once  chlorhexidine 4% Liquid 1 Application(s) Topical daily  dexAMETHasone     Tablet 4 milliGRAM(s) Oral every 6 hours  enoxaparin Injectable 40 milliGRAM(s) SubCutaneous <User Schedule>  insulin lispro (ADMELOG) corrective regimen sliding scale   SubCutaneous three times a day before meals  Junel Fe 1.5/30 28 days 1 Tablet(s) 1 Tablet(s) Oral daily  levothyroxine 175 MICROGram(s) Oral daily    DIET: [x] Regular [] CCD [] Renal [] Puree [] Dysphagia [] Tube Feeds:     IMAGING:   < from: CT Head No Cont (01.13.24 @ 09:37) >  IMPRESSION:  Status post left frontal craniotomy and resection of the left extra-axial   lesion with postoperative changes including pneumocephalus, hemorrhage,   and edema within the left frontal lobe. Small residual dense tissue   within the postoperative bed, possibly residual tumor. Further evaluation   can be obtained with MRI with IV contrast after immediate postoperative   changes have resolved.    Postoperative drain within the left frontal scalp.      Findings regarding possible residual tumor were discussed by Dr. Conway   with Dr. Delarosa on 1/13/2024 at 2:13 PM with read back confirmation.    --- End of Report ---      JESS CONWAY MD; Resident Radiologist  This document has been electronically signed.  ANEUDY PEREZ MD; Attending Radiologist  This document has been electronically signed. Jan 13 2024  3:05PM    < end of copied text >

## 2024-01-14 NOTE — PHYSICAL THERAPY INITIAL EVALUATION ADULT - PERTINENT HX OF CURRENT PROBLEM, REHAB EVAL
Pt is 43 year old female presents to PST prior to scheduled Left Craniotomy Removal Meningioma with Dr. Perez on 1/12/2024. PMhx thyroid cancer, status post partial thyroidectomy 12 years ago, childhood asthma, status post COVID-19 infection June 2022 and received monoclonal antibody infusion. PShx cholecystectomy, removal of fibroid x2. C/o headaches, vertigo-like symptoms for close to a year. She went into a sauna approximately a month ago, felt dizzy lightheaded increased heart rate and had transient loss of consciousness. Patient had an MRI and was found to have a left-sided meningioma. Pt is now s/p craniotomy for resection of tumor L side of brain. Pt is 43 year old female PMhx thyroid cancer, status post partial thyroidectomy 12 years ago, childhood asthma, status post COVID-19 infection June 2022 and received monoclonal antibody infusion. PShx cholecystectomy, removal of fibroid x2. C/o headaches, vertigo-like symptoms for close to a year. She went into a sauna approximately a month ago, felt dizzy lightheaded increased heart rate and had transient loss of consciousness. Patient had an MRI and was found to have a left-sided meningioma. Pt is now s/p craniotomy for resection of tumor L side of brain.  1/13 CT head - Status post left frontal craniotomy and resection of the left extra-axial lesion with postoperative changes including pneumocephalus, hemorrhage, and edema within the left frontal lobe. Small residual dense tissue within the postoperative bed, possibly residual tumor. Further evaluation   can be obtained with MRI with IV contrast after immediate postoperative changes have resolved. Postoperative drain within the L frontal scalp.   1/13 [facility] duplex lower ext vein scan, Bilat - No evidence od DVT to either LE.

## 2024-01-14 NOTE — PROGRESS NOTE ADULT - ASSESSMENT
ASSESSMENT AND PLAN: 43 year old female presents to PST prior to scheduled Left Craniotomy Removal Meningioma with Dr. Perez on 1/12/2024. PMhx thyroid cancer, status post partial thyroidectomy 12 years ago, childhood asthma, status post COVID-19 infection June 2022 and received monoclonal antibody infusion. PShx cholecystectomy, removal of fibroid x2. C/o headaches, vertigo-like symptoms for close to a year. She went into a sauna approximately a month ago, felt dizzy lightheaded increased heart rate and had transient loss of consciousness. Patient had an MRI and was found to have a left-sided meningioma. Denies any chest pain, palpitation at this, denies SOB, N/V, fever or chills.    Of note: pt had thyroidectomy 12 years ago s/p radioactive Iodine. 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 She denies any choking or aspiration but states she has to eat in very small bites slowly. (pt was discussed with Dr. Erickson) RAMIREZ note in chart. Pt. also reports recent palpitation episode in 2022 and 2023, she was seen by Dr. Tellez wore [employer] monitor for one week (12/22/23-12/27/23). As per pt no abnormal results    s/p left craniotomy for tumor resection 1/12/24    NEURO:   - Continue neuro checks q 4  - F/u Surgical Pathology  - Decadron 4q6 for cerebral edema  - MRI Brain w/wo post-op pending  - Monitor left SG SEKOU output, currently 225cc/24hrs  - Keppra for seizure prophylaxis  - Pain control w/ Tylenol prn and Oxycodone prn  - PT/OT pending, activity increase as tolerated    PULM:   - On room air, O2Sat>96%  - Incentive spirometry    CV:  - -160, no issues    ENDO:   - A1c 5.3, on ISS - fingersticks 120-150s - d/c'd ISS, monitor glucose while on steroids  - Continue Synthroid for hypothyroidism    HEME/ONC:             1/14 CBC: leukocytosis 17 - likely from steroids / afebrile, post-op anemia stable         DVT ppx: 1/13 Le Dopp negative, SCDs, SQL    RENAL:   - IVL  - 1/14 BMP K 3.7, supplemented KCl 40meq x 1 and Ca 6.7, supplemented IV Calcium 2g x 1  - Encarnacion d/c'd, will TOV today    ID:   - Afebrile  - Received post-op abx  - 12/28 MRSA/MSSA negative    GI:    - Continue oral diet  - Senna and Miralax for bowel regimen  - Protonix while on steroids    DISCHARGE PLANNING:   PT/OT pending    Plan to be discussed w/ Dr. Broussard  [id number]    ASSESSMENT AND PLAN: 43 year old female presents to PST prior to scheduled Left Craniotomy Removal Meningioma with Dr. Perez on 1/12/2024. PMhx thyroid cancer, status post partial thyroidectomy 12 years ago, childhood asthma, status post COVID-19 infection June 2022 and received monoclonal antibody infusion. PShx cholecystectomy, removal of fibroid x2. C/o headaches, vertigo-like symptoms for close to a year. She went into a sauna approximately a month ago, felt dizzy lightheaded increased heart rate and had transient loss of consciousness. Patient had an MRI and was found to have a left-sided meningioma. Denies any chest pain, palpitation at this, denies SOB, N/V, fever or chills.    Of note: pt had thyroidectomy 12 years ago s/p radioactive Iodine. 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 She denies any choking or aspiration but states she has to eat in very small bites slowly. (pt was discussed with Dr. Erickson) RAMIREZ note in chart. Pt. also reports recent palpitation episode in 2022 and 2023, she was seen by Dr. Tellez wore [employer] monitor for one week (12/22/23-12/27/23). As per pt no abnormal results    s/p left craniotomy for tumor resection 1/12/24    NEURO:   - Continue neuro checks q 4  - F/u Surgical Pathology  - Decadron 4q6 for cerebral edema  - MRI Brain w/wo post-op pending  - Monitor left SG SEKOU output, currently 225cc/24hrs  - Keppra for seizure prophylaxis  - Pain control w/ Tylenol prn and Oxycodone prn  - PT/OT pending, activity increase as tolerated    PULM:   - On room air, O2Sat>96%  - Incentive spirometry    CV:  - -160, no issues    ENDO:   - A1c 5.3, on ISS - fingersticks 120-150s - d/c'd ISS, monitor glucose while on steroids  - Continue Synthroid for hypothyroidism    HEME/ONC:             1/14 CBC: leukocytosis 17 - likely from steroids / afebrile, post-op anemia stable         DVT ppx: 1/13 Le Dopp negative, SCDs, SQL    RENAL:   - IVL  - 1/14 BMP K 3.7, supplemented KCl 40meq x 1 and Ca 6.7, supplemented IV Calcium 2g x 1  - Encarnacion d/c'd, will TOV today    ID:   - Afebrile  - Received post-op abx  - 12/28 MRSA/MSSA negative    GI:    - Continue oral diet  - Senna and Miralax for bowel regimen  - Protonix while on steroids    DISCHARGE PLANNING:   PT/OT pending    Plan to be discussed w/ Dr. Broussard  [id number]    ASSESSMENT AND PLAN: 43 year old female presents to PST prior to scheduled Left Craniotomy Removal Meningioma with Dr. Perez on 1/12/2024. PMhx thyroid cancer, status post partial thyroidectomy 12 years ago, childhood asthma, status post COVID-19 infection June 2022 and received monoclonal antibody infusion. PShx cholecystectomy, removal of fibroid x2. C/o headaches, vertigo-like symptoms for close to a year. She went into a sauna approximately a month ago, felt dizzy lightheaded increased heart rate and had transient loss of consciousness. Patient had an MRI and was found to have a left-sided meningioma. Denies any chest pain, palpitation at this, denies SOB, N/V, fever or chills.    Of note: pt had thyroidectomy 12 years ago s/p radioactive Iodine. 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 She denies any choking or aspiration but states she has to eat in very small bites slowly. (pt was discussed with Dr. Erickson) RAMIREZ note in chart. Pt. also reports recent palpitation episode in 2022 and 2023, she was seen by Dr. Tellez wore [employer] monitor for one week (12/22/23-12/27/23). As per pt no abnormal results    s/p left craniotomy for tumor resection 1/12/24    NEURO:   - Continue neuro checks q 4  - F/u Surgical Pathology  - Decadron 4q6 for cerebral edema  - MRI Brain w/wo post-op pending  - Monitor left SG SEKOU output, currently 225cc/24hrs  - Keppra for seizure prophylaxis  - Pain control w/ Tylenol prn and Oxycodone prn  - PT/OT pending, activity increase as tolerated    PULM:   - On room air, O2Sat>96%  - Incentive spirometry    CV:  - -160, no issues    ENDO:   - A1c 5.3, on ISS - fingersticks 120-150s - d/c'd ISS, monitor glucose while on steroids  - Continue Synthroid for hypothyroidism    HEME/ONC:             1/14 CBC: leukocytosis 17 - likely from steroids / afebrile, post-op anemia stable         DVT ppx: 1/13 Le Dopp negative, SCDs, SQL    RENAL:   - IVL  - 1/14 BMP K 3.7, supplemented KCl 40meq x 1 and Ca 6.7, supplemented IV Calcium 2g x 1  - Encarnacion d/c'd, will TOV today    ID:   - Afebrile  - Received post-op abx  - 12/28 MRSA/MSSA negative    GI:    - Continue oral diet  - Senna and Miralax for bowel regimen  - Protonix while on steroids    DISCHARGE PLANNING:   PT/OT pending    Plan to be discussed w/ Dr. Broussard  [phone]

## 2024-01-14 NOTE — CHART NOTE - NSCHARTNOTEFT_GEN_A_CORE
DEREK [id number]  Discussed w/ AALIYAH Zhou drain output today was 60cc and serosanguinous     Drain type: []SD [x]SG [x] SEKOU [] HMV [] Lumbar drain [] EVD [] ICP [place] [] Abd drain     Patient's position while drain removed: supine    [x] Patient tolerated well [x] No complications [] complications:     Exit Site secured with: [x] _1_ staples [] __ suture (please specify how many of each)     Additional Info: DEREK [id number]  Discussed w/ AALIYAH Zohu drain output today was 60cc and serosanguinous     Drain type: []SD [x]SG [x] SEKOU [] HMV [] Lumbar drain [] EVD [] ICP [place] [] Abd drain     Patient's position while drain removed: supine    [x] Patient tolerated well [x] No complications [] complications:     Exit Site secured with: [x] _1_ staples [] __ suture (please specify how many of each)     Additional Info:

## 2024-01-14 NOTE — PHYSICAL THERAPY INITIAL EVALUATION ADULT - ADDITIONAL COMMENTS
Pt states she lives with  in 2 level home with 1 step to enter and full staircase inside with rail, denies having used assistive device and was independent functionally and with ADL's.

## 2024-01-15 ENCOUNTER — TRANSCRIPTION ENCOUNTER (OUTPATIENT)
Age: 44
End: 2024-01-15

## 2024-01-15 LAB
ANION GAP SERPL CALC-SCNC: 11 MMOL/L — SIGNIFICANT CHANGE UP (ref 5–17)
ANION GAP SERPL CALC-SCNC: 11 MMOL/L — SIGNIFICANT CHANGE UP (ref 5–17)
BUN SERPL-MCNC: 13 MG/DL — SIGNIFICANT CHANGE UP (ref 7–23)
BUN SERPL-MCNC: 13 MG/DL — SIGNIFICANT CHANGE UP (ref 7–23)
CALCIUM SERPL-MCNC: 7.4 MG/DL — LOW (ref 8.4–10.5)
CALCIUM SERPL-MCNC: 7.4 MG/DL — LOW (ref 8.4–10.5)
CHLORIDE SERPL-SCNC: 103 MMOL/L — SIGNIFICANT CHANGE UP (ref 96–108)
CHLORIDE SERPL-SCNC: 103 MMOL/L — SIGNIFICANT CHANGE UP (ref 96–108)
CO2 SERPL-SCNC: 24 MMOL/L — SIGNIFICANT CHANGE UP (ref 22–31)
CO2 SERPL-SCNC: 24 MMOL/L — SIGNIFICANT CHANGE UP (ref 22–31)
CREAT SERPL-MCNC: 0.67 MG/DL — SIGNIFICANT CHANGE UP (ref 0.5–1.3)
CREAT SERPL-MCNC: 0.67 MG/DL — SIGNIFICANT CHANGE UP (ref 0.5–1.3)
EGFR: 110 ML/MIN/1.73M2 — SIGNIFICANT CHANGE UP
EGFR: 110 ML/MIN/1.73M2 — SIGNIFICANT CHANGE UP
GLUCOSE SERPL-MCNC: 122 MG/DL — HIGH (ref 70–99)
GLUCOSE SERPL-MCNC: 122 MG/DL — HIGH (ref 70–99)
HCT VFR BLD CALC: 30.3 % — LOW (ref 34.5–45)
HCT VFR BLD CALC: 30.3 % — LOW (ref 34.5–45)
HGB BLD-MCNC: 10.2 G/DL — LOW (ref 11.5–15.5)
HGB BLD-MCNC: 10.2 G/DL — LOW (ref 11.5–15.5)
MCHC RBC-ENTMCNC: 28.5 PG — SIGNIFICANT CHANGE UP (ref 27–34)
MCHC RBC-ENTMCNC: 28.5 PG — SIGNIFICANT CHANGE UP (ref 27–34)
MCHC RBC-ENTMCNC: 33.7 GM/DL — SIGNIFICANT CHANGE UP (ref 32–36)
MCHC RBC-ENTMCNC: 33.7 GM/DL — SIGNIFICANT CHANGE UP (ref 32–36)
MCV RBC AUTO: 84.6 FL — SIGNIFICANT CHANGE UP (ref 80–100)
MCV RBC AUTO: 84.6 FL — SIGNIFICANT CHANGE UP (ref 80–100)
NRBC # BLD: 0 /100 WBCS — SIGNIFICANT CHANGE UP (ref 0–0)
NRBC # BLD: 0 /100 WBCS — SIGNIFICANT CHANGE UP (ref 0–0)
PLATELET # BLD AUTO: 284 K/UL — SIGNIFICANT CHANGE UP (ref 150–400)
PLATELET # BLD AUTO: 284 K/UL — SIGNIFICANT CHANGE UP (ref 150–400)
POTASSIUM SERPL-MCNC: 4.1 MMOL/L — SIGNIFICANT CHANGE UP (ref 3.5–5.3)
POTASSIUM SERPL-MCNC: 4.1 MMOL/L — SIGNIFICANT CHANGE UP (ref 3.5–5.3)
POTASSIUM SERPL-SCNC: 4.1 MMOL/L — SIGNIFICANT CHANGE UP (ref 3.5–5.3)
POTASSIUM SERPL-SCNC: 4.1 MMOL/L — SIGNIFICANT CHANGE UP (ref 3.5–5.3)
RBC # BLD: 3.58 M/UL — LOW (ref 3.8–5.2)
RBC # BLD: 3.58 M/UL — LOW (ref 3.8–5.2)
RBC # FLD: 12.7 % — SIGNIFICANT CHANGE UP (ref 10.3–14.5)
RBC # FLD: 12.7 % — SIGNIFICANT CHANGE UP (ref 10.3–14.5)
SODIUM SERPL-SCNC: 138 MMOL/L — SIGNIFICANT CHANGE UP (ref 135–145)
SODIUM SERPL-SCNC: 138 MMOL/L — SIGNIFICANT CHANGE UP (ref 135–145)
WBC # BLD: 15.13 K/UL — HIGH (ref 3.8–10.5)
WBC # BLD: 15.13 K/UL — HIGH (ref 3.8–10.5)
WBC # FLD AUTO: 15.13 K/UL — HIGH (ref 3.8–10.5)
WBC # FLD AUTO: 15.13 K/UL — HIGH (ref 3.8–10.5)

## 2024-01-15 RX ORDER — DEXAMETHASONE 0.5 MG/5ML
4 ELIXIR ORAL EVERY 8 HOURS
Refills: 0 | Status: DISCONTINUED | OUTPATIENT
Start: 2024-01-15 | End: 2024-01-16

## 2024-01-15 RX ADMIN — CALCITRIOL 0.5 MICROGRAM(S): 0.5 CAPSULE ORAL at 11:26

## 2024-01-15 RX ADMIN — Medication 650 MILLIGRAM(S): at 17:50

## 2024-01-15 RX ADMIN — Medication 4 MILLIGRAM(S): at 11:27

## 2024-01-15 RX ADMIN — Medication 650 MILLIGRAM(S): at 12:30

## 2024-01-15 RX ADMIN — ENOXAPARIN SODIUM 40 MILLIGRAM(S): 100 INJECTION SUBCUTANEOUS at 21:11

## 2024-01-15 RX ADMIN — Medication 4 MILLIGRAM(S): at 05:18

## 2024-01-15 RX ADMIN — Medication 650 MILLIGRAM(S): at 23:51

## 2024-01-15 RX ADMIN — Medication 175 MICROGRAM(S): at 05:18

## 2024-01-15 RX ADMIN — Medication 650 MILLIGRAM(S): at 05:18

## 2024-01-15 RX ADMIN — Medication 4 MILLIGRAM(S): at 21:11

## 2024-01-15 RX ADMIN — Medication 4 MILLIGRAM(S): at 16:51

## 2024-01-15 RX ADMIN — Medication 650 MILLIGRAM(S): at 05:48

## 2024-01-15 RX ADMIN — Medication 650 MILLIGRAM(S): at 11:23

## 2024-01-15 RX ADMIN — POLYETHYLENE GLYCOL 3350 17 GRAM(S): 17 POWDER, FOR SOLUTION ORAL at 11:26

## 2024-01-15 RX ADMIN — LEVETIRACETAM 500 MILLIGRAM(S): 250 TABLET, FILM COATED ORAL at 05:18

## 2024-01-15 RX ADMIN — Medication 650 MILLIGRAM(S): at 17:30

## 2024-01-15 RX ADMIN — LEVETIRACETAM 500 MILLIGRAM(S): 250 TABLET, FILM COATED ORAL at 16:53

## 2024-01-15 RX ADMIN — PANTOPRAZOLE SODIUM 40 MILLIGRAM(S): 20 TABLET, DELAYED RELEASE ORAL at 05:18

## 2024-01-15 NOTE — DISCHARGE NOTE PROVIDER - CARE PROVIDER_API CALL
Johnathan Broussard (MD)  Neurosurgery  [address], [address]  [place] [address]  Phone: [phone]  Fax: [phone]  Follow Up Time:    Johnathan Broussard (MD)  Neurosurgery  [address], [address]  [place] [address]  Phone: [phone]  Fax: [phone]  Follow Up Time:    Johnathan Broussard (MD)  Neurosurgery  [address], [address]  [place] [address]  Phone: [phone]  Fax: [phone]  Follow Up Time:    Johnathan Broussard (MD)  Neurosurgery  [address], [address]  [place] [address]  Phone: [phone]  Fax: [phone]  Follow Up Time:

## 2024-01-15 NOTE — DISCHARGE NOTE PROVIDER - CARE PROVIDERS DIRECT ADDRESSES
,[email or web address] ,[email or web address]

## 2024-01-15 NOTE — DISCHARGE NOTE PROVIDER - REASON FOR ADMISSION
s/p left craniotomy for tumor resection 1/12/24 s/p left craniotomy for tumor resection 1/12/24. Final pathology pending at time of discharge.

## 2024-01-15 NOTE — DISCHARGE NOTE PROVIDER - NSDCACTIVITY_GEN_ALL_CORE
Do not drive or operate machinery/Stairs allowed/Walking - Indoors allowed/No heavy lifting/straining/Walking - Outdoors allowed Bathing allowed/Do not drive or operate machinery/Showering allowed/Stairs allowed/Walking - Indoors allowed/No heavy lifting/straining/Walking - Outdoors allowed/Follow Instructions Provided by your Surgical Team

## 2024-01-15 NOTE — PROGRESS NOTE ADULT - SUBJECTIVE AND OBJECTIVE BOX
Pt seen and examined.   at bedside  Doing well. Has been OOB and ambulating slowly with assistance  Speech fluent  GAMA well  Wound C & D  MRI - complete resection. post-op changes    Cont' post op care  slow decadron taper  d/c home today or tomorrow

## 2024-01-15 NOTE — PROGRESS NOTE ADULT - SUBJECTIVE AND OBJECTIVE BOX
SUBJECTIVE: HPI:  43 year old female presents to PST prior to scheduled Left Craniotomy Removal Meningioma with Dr. Perez on 1/12/2024. PMhx thyroid cancer, status post partial thyroidectomy 12 years ago, childhood asthma, status post COVID-19 infection June 2022 and received monoclonal antibody infusion. PShx cholecystectomy, removal of fibroid x2. C/o headaches, vertigo-like symptoms for close to a year. She went into a sauna approximately a month ago, felt dizzy lightheaded increased heart rate and had transient loss of consciousness. Patient had an MRI and was found to have a left-sided meningioma. Denies any chest pain, palpitation at this, denies SOB, N/V, fever or chills.    Of note: pt had thyroidectomy 12 years ago s/p radioactive Iodine. 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 She denies any choking or aspiration but states she has to eat in very small bites slowly. (pt was discussed with Dr. Erickson) RAMIREZ note in chart. Pt. also reports recent palpitation episode in 2022 and 2023, she was seen by Dr. Tellez wore halter monitor for one week (12/22/23-12/27/23). As per pt no abnormal results. (28 Dec 2023 07:13)      OVERNIGHT EVENTS: No acute events overnight, patient seen and evaluated at bedside doing well reports ambulating to the bathroom and states that she felt okay but still being cautious.     Vital Signs Last 24 Hrs  T(C): 36.7 (15 Mychal 2024 08:13), Max: 37 (15 Mychal 2024 00:15)  T(F): 98.1 (15 Mychal 2024 08:13), Max: 98.6 (15 Mychal 2024 00:15)  HR: 74 (15 Mychal 2024 08:13) (68 - 84)  BP: 124/77 (15 Mychal 2024 08:13) (122/73 - 149/82)  BP(mean): 92 (14 Jan 2024 14:00) (92 - 105)  RR: 18 (15 Mychal 2024 08:13) (16 - 18)  SpO2: 95% (15 Mychal 2024 08:13) (95% - 98%)    Parameters below as of 15 Mychal 2024 08:13  Patient On (Oxygen Delivery Method): room air        PHYSICAL EXAM:    General: No Acute Distress     Neurological: Awake, OX3 (name, place, date), PERRL, EOMI, no drift, following commands, no drift, uppers 5/5, lowers 5/5, SILT    Pulmonary: Clear to Auscultation, No Rales, No Rhonchi, No Wheezes     Cardiovascular: S1, S2, Regular Rate and Rhythm     Gastrointestinal: Soft, Nontender, Nondistended     Incision: Left craniotomy incision has surgical staples in place, C/D/I, has one surgical staple at previous surgical drain site    LABS:                        10.2   15.13 )-----------( 284      ( 15 Mychal 2024 05:09 )             30.3    01-15    138  |  103  |  13  ----------------------------<  122<H>  4.1   |  24  |  0.67    Ca    7.4<L>      15 Mycahl 2024 05:09          01-14 @ 07:01  -  01-15 @ 07:00  --------------------------------------------------------  IN: 1000 mL / OUT: 1000 mL / NET: 0 mL      DRAINS: None    MEDICATIONS:  Antibiotics:    Neuro:  acetaminophen     Tablet .. 650 milliGRAM(s) Oral every 6 hours PRN Temp greater or equal to 38C (100.4F), Mild Pain (1 - 3)  levETIRAcetam 500 milliGRAM(s) Oral every 12 hours  oxyCODONE    IR 10 milliGRAM(s) Oral every 4 hours PRN Severe Pain (7 - 10)  oxyCODONE    IR 5 milliGRAM(s) Oral every 4 hours PRN Moderate Pain (4 - 6)    Cardiac:    Pulm:    GI/:  pantoprazole    Tablet 40 milliGRAM(s) Oral before breakfast  polyethylene glycol 3350 17 Gram(s) Oral daily  senna 1 Tablet(s) Oral at bedtime    Other:   calcitriol   Capsule 0.5 MICROGram(s) Oral daily  chlorhexidine 4% Liquid 1 Application(s) Topical daily  dexAMETHasone     Tablet 4 milliGRAM(s) Oral every 6 hours  enoxaparin Injectable 40 milliGRAM(s) SubCutaneous <User Schedule>  Junel Fe 1.5/30 28 days 1 Tablet(s) 1 Tablet(s) Oral daily  levothyroxine 175 MICROGram(s) Oral daily    DIET: [x] Regular [] CCD [] Renal [] Puree [] Dysphagia [] Tube Feeds:     IMAGING:   < from: CT Head No Cont (01.13.24 @ 09:37) >  IMPRESSION:  Status post left frontal craniotomy and resection of the left extra-axial   lesion with postoperative changes including pneumocephalus, hemorrhage,   and edema within the left frontal lobe. Small residual dense tissue   within the postoperative bed, possibly residual tumor. Further evaluation   can be obtained with MRI with IV contrast after immediate postoperative   changes have resolved.    Postoperative drain within the left frontal scalp.      Findings regarding possible residual tumor were discussed by Dr. Conway   with Dr. Delarosa on 1/13/2024 at 2:13 PM with read back confirmation.    --- End of Report ---     JESS CONWAY MD; Resident Radiologist  This document has been electronically signed.  ANEUDY PEREZ MD; Attending Radiologist  This document has been electronically signed. Jan 13 2024  3:05PM    < end of copied text >   SUBJECTIVE: HPI:  43 year old female presents to PST prior to scheduled Left Craniotomy Removal Meningioma with Dr. Perez on 1/12/2024. PMhx thyroid cancer, status post partial thyroidectomy 12 years ago, childhood asthma, status post COVID-19 infection June 2022 and received monoclonal antibody infusion. PShx cholecystectomy, removal of fibroid x2. C/o headaches, vertigo-like symptoms for close to a year. She went into a sauna approximately a month ago, felt dizzy lightheaded increased heart rate and had transient loss of consciousness. Patient had an MRI and was found to have a left-sided meningioma. Denies any chest pain, palpitation at this, denies SOB, N/V, fever or chills.    Of note: pt had thyroidectomy 12 years ago s/p radioactive Iodine. 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 She denies any choking or aspiration but states she has to eat in very small bites slowly. (pt was discussed with Dr. Erickson) RAMIREZ note in chart. Pt. also reports recent palpitation episode in 2022 and 2023, she was seen by Dr. Tellez wore halter monitor for one week (12/22/23-12/27/23). As per pt no abnormal results. (28 Dec 2023 07:13)      OVERNIGHT EVENTS: No acute events overnight, patient seen and evaluated at bedside doing well reports ambulating to the bathroom and states that she felt okay but still being cautious.     Vital Signs Last 24 Hrs  T(C): 36.7 (15 Mychal 2024 08:13), Max: 37 (15 Mychal 2024 00:15)  T(F): 98.1 (15 Mychal 2024 08:13), Max: 98.6 (15 Mychal 2024 00:15)  HR: 74 (15 Mychal 2024 08:13) (68 - 84)  BP: 124/77 (15 Mychal 2024 08:13) (122/73 - 149/82)  BP(mean): 92 (14 Jan 2024 14:00) (92 - 105)  RR: 18 (15 Mychal 2024 08:13) (16 - 18)  SpO2: 95% (15 Mychal 2024 08:13) (95% - 98%)    Parameters below as of 15 Mychal 2024 08:13  Patient On (Oxygen Delivery Method): room air        PHYSICAL EXAM:    General: No Acute Distress     Neurological: Awake, OX3 (name, place, date), PERRL, EOMI, no drift, following commands, no drift, uppers 5/5, lowers 5/5, SILT    Pulmonary: Clear to Auscultation, No Rales, No Rhonchi, No Wheezes     Cardiovascular: S1, S2, Regular Rate and Rhythm     Gastrointestinal: Soft, Nontender, Nondistended     Incision: Left craniotomy incision has surgical staples in place, C/D/I, has one surgical staple at previous surgical drain site    LABS:                        10.2   15.13 )-----------( 284      ( 15 Mychal 2024 05:09 )             30.3    01-15    138  |  103  |  13  ----------------------------<  122<H>  4.1   |  24  |  0.67    Ca    7.4<L>      15 Mychal 2024 05:09          01-14 @ 07:01  -  01-15 @ 07:00  --------------------------------------------------------  IN: 1000 mL / OUT: 1000 mL / NET: 0 mL      DRAINS: None    MEDICATIONS:  Antibiotics:    Neuro:  acetaminophen     Tablet .. 650 milliGRAM(s) Oral every 6 hours PRN Temp greater or equal to 38C (100.4F), Mild Pain (1 - 3)  levETIRAcetam 500 milliGRAM(s) Oral every 12 hours  oxyCODONE    IR 10 milliGRAM(s) Oral every 4 hours PRN Severe Pain (7 - 10)  oxyCODONE    IR 5 milliGRAM(s) Oral every 4 hours PRN Moderate Pain (4 - 6)    Cardiac:    Pulm:    GI/:  pantoprazole    Tablet 40 milliGRAM(s) Oral before breakfast  polyethylene glycol 3350 17 Gram(s) Oral daily  senna 1 Tablet(s) Oral at bedtime    Other:   calcitriol   Capsule 0.5 MICROGram(s) Oral daily  chlorhexidine 4% Liquid 1 Application(s) Topical daily  dexAMETHasone     Tablet 4 milliGRAM(s) Oral every 6 hours  enoxaparin Injectable 40 milliGRAM(s) SubCutaneous <User Schedule>  Junel Fe 1.5/30 28 days 1 Tablet(s) 1 Tablet(s) Oral daily  levothyroxine 175 MICROGram(s) Oral daily    DIET: [x] Regular [] CCD [] Renal [] Puree [] Dysphagia [] Tube Feeds:     IMAGING:   < from: CT Head No Cont (01.13.24 @ 09:37) >  IMPRESSION:  Status post left frontal craniotomy and resection of the left extra-axial   lesion with postoperative changes including pneumocephalus, hemorrhage,   and edema within the left frontal lobe. Small residual dense tissue   within the postoperative bed, possibly residual tumor. Further evaluation   can be obtained with MRI with IV contrast after immediate postoperative   changes have resolved.    Postoperative drain within the left frontal scalp.      Findings regarding possible residual tumor were discussed by Dr. Conway   with Dr. Delarosa on 1/13/2024 at 2:13 PM with read back confirmation.    --- End of Report ---     JESS CONWAY MD; Resident Radiologist  This document has been electronically signed.  ANEUDY PEREZ MD; Attending Radiologist  This document has been electronically signed. Jan 13 2024  3:05PM    < end of copied text >   SUBJECTIVE: HPI:  43 year old female presents to PST prior to scheduled Left Craniotomy Removal Meningioma with Dr. Perez on 1/12/2024. PMhx thyroid cancer, status post partial thyroidectomy 12 years ago, childhood asthma, status post COVID-19 infection June 2022 and received monoclonal antibody infusion. PShx cholecystectomy, removal of fibroid x2. C/o headaches, vertigo-like symptoms for close to a year. She went into a sauna approximately a month ago, felt dizzy lightheaded increased heart rate and had transient loss of consciousness. Patient had an MRI and was found to have a left-sided meningioma. Denies any chest pain, palpitation at this, denies SOB, N/V, fever or chills.    Of note: pt had thyroidectomy 12 years ago s/p radioactive Iodine. 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 She denies any choking or aspiration but states she has to eat in very small bites slowly. (pt was discussed with Dr. Erickson) RAMIREZ note in chart. Pt. also reports recent palpitation episode in 2022 and 2023, she was seen by Dr. Tellez wore halter monitor for one week (12/22/23-12/27/23). As per pt no abnormal results. (28 Dec 2023 07:13)      OVERNIGHT EVENTS: No acute events overnight, patient seen and evaluated at bedside doing well reports ambulating to the bathroom and states that she felt okay but still being cautious.     Vital Signs Last 24 Hrs  T(C): 36.7 (15 Mychal 2024 08:13), Max: 37 (15 Mychal 2024 00:15)  T(F): 98.1 (15 Mychal 2024 08:13), Max: 98.6 (15 Mychal 2024 00:15)  HR: 74 (15 Mychal 2024 08:13) (68 - 84)  BP: 124/77 (15 Mychal 2024 08:13) (122/73 - 149/82)  BP(mean): 92 (14 Jan 2024 14:00) (92 - 105)  RR: 18 (15 Mychal 2024 08:13) (16 - 18)  SpO2: 95% (15 Mychal 2024 08:13) (95% - 98%)    Parameters below as of 15 Mychal 2024 08:13  Patient On (Oxygen Delivery Method): room air        PHYSICAL EXAM:    General: No Acute Distress     Neurological: Awake, OX3 (name, place, date), PERRL, EOMI, no drift, following commands, no drift, uppers 5/5, lowers 5/5, SILT    Pulmonary: Clear to Auscultation, No Rales, No Rhonchi, No Wheezes     Cardiovascular: S1, S2, Regular Rate and Rhythm     Gastrointestinal: Soft, Nontender, Nondistended     Incision: Left craniotomy incision has surgical staples in place, C/D/I, has one surgical staple at previous surgical drain site    LABS:                        10.2   15.13 )-----------( 284      ( 15 Mychal 2024 05:09 )             30.3    01-15    138  |  103  |  13  ----------------------------<  122<H>  4.1   |  24  |  0.67    Ca    7.4<L>      15 Mychal 2024 05:09          01-14 @ 07:01  -  01-15 @ 07:00  --------------------------------------------------------  IN: 1000 mL / OUT: 1000 mL / NET: 0 mL      DRAINS: None    MEDICATIONS:  Antibiotics:    Neuro:  acetaminophen     Tablet .. 650 milliGRAM(s) Oral every 6 hours PRN Temp greater or equal to 38C (100.4F), Mild Pain (1 - 3)  levETIRAcetam 500 milliGRAM(s) Oral every 12 hours  oxyCODONE    IR 10 milliGRAM(s) Oral every 4 hours PRN Severe Pain (7 - 10)  oxyCODONE    IR 5 milliGRAM(s) Oral every 4 hours PRN Moderate Pain (4 - 6)    Cardiac:    Pulm:    GI/:  pantoprazole    Tablet 40 milliGRAM(s) Oral before breakfast  polyethylene glycol 3350 17 Gram(s) Oral daily  senna 1 Tablet(s) Oral at bedtime    Other:   calcitriol   Capsule 0.5 MICROGram(s) Oral daily  chlorhexidine 4% Liquid 1 Application(s) Topical daily  dexAMETHasone     Tablet 4 milliGRAM(s) Oral every 6 hours  enoxaparin Injectable 40 milliGRAM(s) SubCutaneous <User Schedule>  Junel Fe 1.5/30 28 days 1 Tablet(s) 1 Tablet(s) Oral daily  levothyroxine 175 MICROGram(s) Oral daily    DIET: [x] Regular [] CCD [] Renal [] Puree [] Dysphagia [] Tube Feeds:     IMAGING:   < from: CT Head No Cont (01.13.24 @ 09:37) >  IMPRESSION:  Status post left frontal craniotomy and resection of the left extra-axial   lesion with postoperative changes including pneumocephalus, hemorrhage,   and edema within the left frontal lobe. Small residual dense tissue   within the postoperative bed, possibly residual tumor. Further evaluation   can be obtained with MRI with IV contrast after immediate postoperative   changes have resolved.    Postoperative drain within the left frontal scalp.      Findings regarding possible residual tumor were discussed by Dr. Conway   with Dr. Delarosa on 1/13/2024 at 2:13 PM with read back confirmation.    --- End of Report ---     JESS CONWAY MD; Resident Radiologist  This document has been electronically signed.  ANEUDY PEREZ MD; Attending Radiologist  This document has been electronically signed. Jan 13 2024  3:05PM    < end of copied text >

## 2024-01-15 NOTE — PROGRESS NOTE ADULT - ASSESSMENT
Received update from medical staff that baby is set to transfer to [facility] on 5/28 for surgery eval. Met with mother at baby's bedside, mother appeared to be in good spirits regarding potential surgery and plan of care. LCSW provided mother with handout with [facility]'s address, main PH# and the PH# for NICU social workers. Provided mother with support, mother denied any questions or needs at current.    05/27/24 1303   Discharge Reassessment   Assessment Type Final Discharge Note   Did the patient's condition or plan change since previous assessment? No   Discharge Plan discussed with: Parent(s)   Name(s) and Number(s) Es Quevedo [phone]   Communicated GABE with patient/caregiver Date not available/Unable to determine   Discharge Plan A Hospital Transfer   Why the patient remains in the hospital Requires continued medical care        ASSESSMENT AND PLAN: 43 year old female presents to PST prior to scheduled Left Craniotomy Removal Meningioma with Dr. Perez on 1/12/2024. PMhx thyroid cancer, status post partial thyroidectomy 12 years ago, childhood asthma, status post COVID-19 infection June 2022 and received monoclonal antibody infusion. PShx cholecystectomy, removal of fibroid x2. C/o headaches, vertigo-like symptoms for close to a year. She went into a sauna approximately a month ago, felt dizzy lightheaded increased heart rate and had transient loss of consciousness. Patient had an MRI and was found to have a left-sided meningioma. Denies any chest pain, palpitation at this, denies SOB, N/V, fever or chills.    Of note: pt had thyroidectomy 12 years ago s/p radioactive Iodine. 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 She denies any choking or aspiration but states she has to eat in very small bites slowly. (pt was discussed with Dr. Erickson) RAMIREZ note in chart. Pt. also reports recent palpitation episode in 2022 and 2023, she was seen by Dr. Tellez wore halter monitor for one week (12/22/23-12/27/23). As per pt no abnormal results    s/p left craniotomy for tumor resection 1/12/24    NEURO:   - Continue neuro checks q 4  - F/u Surgical Pathology  - Decadron 4q6 for cerebral edema  - MRI Brain w/wo post-op pending  - SG SEKOU removed yesterday at bedside  - Keppra for seizure prophylaxis  - Pain control w/ Tylenol prn and Oxycodone prn  - PT - pending further evaluation, OT: no skilled OT needs, activity increase as tolerated    PULM:   - On room air, O2Sat>96%  - Incentive spirometry    CV:  - -160, no issues    ENDO:   - A1c 5.3, monitor glucose while on steroids, stable  - Continue Synthroid for hypothyroidism    HEME/ONC:             1/15 CBC: downtrending leukocytosis, on steroids, afebrile, stable post-op anemia         DVT ppx: 1/13 Le Dopp negative, SCDs, SQL    RENAL:   - IVL  - 1/15 BMP stable  - Encarnacion d/c'd 1/14, currently voiding    ID:   - Afebrile  - Received post-op abx  - 12/28 MRSA/MSSA negative    GI:    - Continue oral diet  - Senna and Miralax for bowel regimen  - Protonix while on steroids    DISCHARGE PLANNING:   PT - pending further ambulation, OT no skilled needs    Plan to be discussed w/ Dr. Broussard  [id number]    ASSESSMENT AND PLAN: 43 year old female presents to PST prior to scheduled Left Craniotomy Removal Meningioma with Dr. Perez on 1/12/2024. PMhx thyroid cancer, status post partial thyroidectomy 12 years ago, childhood asthma, status post COVID-19 infection June 2022 and received monoclonal antibody infusion. PShx cholecystectomy, removal of fibroid x2. C/o headaches, vertigo-like symptoms for close to a year. She went into a sauna approximately a month ago, felt dizzy lightheaded increased heart rate and had transient loss of consciousness. Patient had an MRI and was found to have a left-sided meningioma. Denies any chest pain, palpitation at this, denies SOB, N/V, fever or chills.    Of note: pt had thyroidectomy 12 years ago s/p radioactive Iodine. 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 She denies any choking or aspiration but states she has to eat in very small bites slowly. (pt was discussed with Dr. Erickson) RAMIREZ note in chart. Pt. also reports recent palpitation episode in 2022 and 2023, she was seen by Dr. Tellez wore halter monitor for one week (12/22/23-12/27/23). As per pt no abnormal results    s/p left craniotomy for tumor resection 1/12/24    NEURO:   - Continue neuro checks q 4  - F/u Surgical Pathology  - Decadron 4q6 for cerebral edema  - MRI Brain w/wo post-op pending  - SG SEKOU removed yesterday at bedside  - Keppra for seizure prophylaxis  - Pain control w/ Tylenol prn and Oxycodone prn  - PT - pending further evaluation, OT: no skilled OT needs, activity increase as tolerated    PULM:   - On room air, O2Sat>96%  - Incentive spirometry    CV:  - -160, no issues    ENDO:   - A1c 5.3, monitor glucose while on steroids, stable  - Continue Synthroid for hypothyroidism    HEME/ONC:             1/15 CBC: downtrending leukocytosis, on steroids, afebrile, stable post-op anemia         DVT ppx: 1/13 Le Dopp negative, SCDs, SQL    RENAL:   - IVL  - 1/15 BMP stable  - Encarnacion d/c'd 1/14, currently voiding    ID:   - Afebrile  - Received post-op abx  - 12/28 MRSA/MSSA negative    GI:    - Continue oral diet  - Senna and Miralax for bowel regimen  - Protonix while on steroids    DISCHARGE PLANNING:   PT - pending further ambulation, OT no skilled needs    Plan to be discussed w/ Dr. Broussard  [id number]    ASSESSMENT AND PLAN: 43 year old female presents to PST prior to scheduled Left Craniotomy Removal Meningioma with Dr. Perez on 1/12/2024. PMhx thyroid cancer, status post partial thyroidectomy 12 years ago, childhood asthma, status post COVID-19 infection June 2022 and received monoclonal antibody infusion. PShx cholecystectomy, removal of fibroid x2. C/o headaches, vertigo-like symptoms for close to a year. She went into a sauna approximately a month ago, felt dizzy lightheaded increased heart rate and had transient loss of consciousness. Patient had an MRI and was found to have a left-sided meningioma. Denies any chest pain, palpitation at this, denies SOB, N/V, fever or chills.    Of note: pt had thyroidectomy 12 years ago s/p radioactive Iodine. 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 She denies any choking or aspiration but states she has to eat in very small bites slowly. (pt was discussed with Dr. Erickson) RAMIREZ note in chart. Pt. also reports recent palpitation episode in 2022 and 2023, she was seen by Dr. Tellez wore halter monitor for one week (12/22/23-12/27/23). As per pt no abnormal results    s/p left craniotomy for tumor resection 1/12/24    NEURO:   - Continue neuro checks q 4  - F/u Surgical Pathology  - Decadron 4q6 for cerebral edema  - MRI Brain w/wo post-op pending  - SG SEKOU removed yesterday at bedside  - Keppra for seizure prophylaxis  - Pain control w/ Tylenol prn and Oxycodone prn  - PT - pending further evaluation, OT: no skilled OT needs, activity increase as tolerated    PULM:   - On room air, O2Sat>96%  - Incentive spirometry    CV:  - -160, no issues    ENDO:   - A1c 5.3, monitor glucose while on steroids, stable  - Continue Synthroid for hypothyroidism    HEME/ONC:             1/15 CBC: downtrending leukocytosis, on steroids, afebrile, stable post-op anemia         DVT ppx: 1/13 Le Dopp negative, SCDs, SQL    RENAL:   - IVL  - 1/15 BMP stable  - Encarnacion d/c'd 1/14, currently voiding    ID:   - Afebrile  - Received post-op abx  - 12/28 MRSA/MSSA negative    GI:    - Continue oral diet  - Senna and Miralax for bowel regimen  - Protonix while on steroids    DISCHARGE PLANNING:   PT - pending further ambulation, OT no skilled needs    Plan to be discussed w/ Dr. Broussard  [id number]    ASSESSMENT AND PLAN: 43 year old female presents to PST prior to scheduled Left Craniotomy Removal Meningioma with Dr. Perez on 1/12/2024. PMhx thyroid cancer, status post partial thyroidectomy 12 years ago, childhood asthma, status post COVID-19 infection June 2022 and received monoclonal antibody infusion. PShx cholecystectomy, removal of fibroid x2. C/o headaches, vertigo-like symptoms for close to a year. She went into a sauna approximately a month ago, felt dizzy lightheaded increased heart rate and had transient loss of consciousness. Patient had an MRI and was found to have a left-sided meningioma. Denies any chest pain, palpitation at this, denies SOB, N/V, fever or chills.    Of note: pt had thyroidectomy 12 years ago s/p radioactive Iodine. 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 She denies any choking or aspiration but states she has to eat in very small bites slowly. (pt was discussed with Dr. Erickson) RAMIREZ note in chart. Pt. also reports recent palpitation episode in 2022 and 2023, she was seen by Dr. Tellez wore halter monitor for one week (12/22/23-12/27/23). As per pt no abnormal results    s/p left craniotomy for tumor resection 1/12/24    NEURO:   - Continue neuro checks q 4  - F/u Surgical Pathology  - Decadron 4q6 for cerebral edema  - MRI Brain w/wo post-op done yesterday, awaiting official report but reviewed by Dr. Broussard  -  SEKOU removed yesterday at bedside  - Keppra for seizure prophylaxis  - Pain control w/ Tylenol prn and Oxycodone prn  - PT - pending further evaluation, OT: no skilled OT needs, activity increase as tolerated    PULM:   - On room air, O2Sat>96%  - Incentive spirometry    CV:  - -160, no issues    ENDO:   - A1c 5.3, monitor glucose while on steroids, stable  - Continue Synthroid for hypothyroidism    HEME/ONC:             1/15 CBC: downtrending leukocytosis, on steroids, afebrile, stable post-op anemia         DVT ppx: 1/13 Le Dopp negative, SCDs, SQL    RENAL:   - IVL  - 1/15 BMP stable  - Encarnacion d/c'd 1/14, currently voiding    ID:   - Afebrile  - Received post-op abx  - 12/28 MRSA/MSSA negative    GI:    - Continue oral diet  - Senna and Miralax for bowel regimen  - Protonix while on steroids    DISCHARGE PLANNING:   PT - pending further ambulation, OT no skilled needs    Plan to be discussed w/ Dr. Broussard  [id number]    ASSESSMENT AND PLAN: 43 year old female presents to PST prior to scheduled Left Craniotomy Removal Meningioma with Dr. Preez on 1/12/2024. PMhx thyroid cancer, status post partial thyroidectomy 12 years ago, childhood asthma, status post COVID-19 infection June 2022 and received monoclonal antibody infusion. PShx cholecystectomy, removal of fibroid x2. C/o headaches, vertigo-like symptoms for close to a year. She went into a sauna approximately a month ago, felt dizzy lightheaded increased heart rate and had transient loss of consciousness. Patient had an MRI and was found to have a left-sided meningioma. Denies any chest pain, palpitation at this, denies SOB, N/V, fever or chills.    Of note: pt had thyroidectomy 12 years ago s/p radioactive Iodine. 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 She denies any choking or aspiration but states she has to eat in very small bites slowly. (pt was discussed with Dr. Erickson) RAMIREZ note in chart. Pt. also reports recent palpitation episode in 2022 and 2023, she was seen by Dr. Tellez wore halter monitor for one week (12/22/23-12/27/23). As per pt no abnormal results    s/p left craniotomy for tumor resection 1/12/24    NEURO:   - Continue neuro checks q 4  - F/u Surgical Pathology  - Decadron 4q6 for cerebral edema  - MRI Brain w/wo post-op done yesterday, awaiting official report but reviewed by Dr. Broussard  -  SEKOU removed yesterday at bedside  - Keppra for seizure prophylaxis  - Pain control w/ Tylenol prn and Oxycodone prn  - PT - pending further evaluation, OT: no skilled OT needs, activity increase as tolerated    PULM:   - On room air, O2Sat>96%  - Incentive spirometry    CV:  - -160, no issues    ENDO:   - A1c 5.3, monitor glucose while on steroids, stable  - Continue Synthroid for hypothyroidism    HEME/ONC:             1/15 CBC: downtrending leukocytosis, on steroids, afebrile, stable post-op anemia         DVT ppx: 1/13 Le Dopp negative, SCDs, SQL    RENAL:   - IVL  - 1/15 BMP stable  - Encarnacion d/c'd 1/14, currently voiding    ID:   - Afebrile  - Received post-op abx  - 12/28 MRSA/MSSA negative    GI:    - Continue oral diet  - Senna and Miralax for bowel regimen  - Protonix while on steroids    DISCHARGE PLANNING:   PT - pending further ambulation, OT no skilled needs    Plan to be discussed w/ Dr. Broussard  [id number]    ASSESSMENT AND PLAN: 43 year old female presents to PST prior to scheduled Left Craniotomy Removal Meningioma with Dr. Perez on 1/12/2024. PMhx thyroid cancer, status post partial thyroidectomy 12 years ago, childhood asthma, status post COVID-19 infection June 2022 and received monoclonal antibody infusion. PShx cholecystectomy, removal of fibroid x2. C/o headaches, vertigo-like symptoms for close to a year. She went into a sauna approximately a month ago, felt dizzy lightheaded increased heart rate and had transient loss of consciousness. Patient had an MRI and was found to have a left-sided meningioma. Denies any chest pain, palpitation at this, denies SOB, N/V, fever or chills.    Of note: pt had thyroidectomy 12 years ago s/p radioactive Iodine. 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 She denies any choking or aspiration but states she has to eat in very small bites slowly. (pt was discussed with Dr. Erickson) RAMIREZ note in chart. Pt. also reports recent palpitation episode in 2022 and 2023, she was seen by Dr. Tellez wore halter monitor for one week (12/22/23-12/27/23). As per pt no abnormal results    s/p left craniotomy for tumor resection 1/12/24    NEURO:   - Continue neuro checks q 4  - F/u Surgical Pathology  - Decadron 4q6 for cerebral edema  - MRI Brain w/wo post-op done yesterday, awaiting official report but reviewed by Dr. Broussard  -  SEKOU removed yesterday at bedside  - Keppra for seizure prophylaxis  - Pain control w/ Tylenol prn and Oxycodone prn  - PT - pending further evaluation, OT: no skilled OT needs, activity increase as tolerated    PULM:   - On room air, O2Sat>96%  - Incentive spirometry    CV:  - -160, no issues    ENDO:   - A1c 5.3, monitor glucose while on steroids, stable  - Continue Synthroid for hypothyroidism    HEME/ONC:             1/15 CBC: downtrending leukocytosis, on steroids, afebrile, stable post-op anemia         DVT ppx: 1/13 Le Dopp negative, SCDs, SQL    RENAL:   - IVL  - 1/15 BMP stable  - Encarnacion d/c'd 1/14, currently voiding    ID:   - Afebrile  - Received post-op abx  - 12/28 MRSA/MSSA negative    GI:    - Continue oral diet  - Senna and Miralax for bowel regimen  - Protonix while on steroids    DISCHARGE PLANNING:   PT - pending further ambulation, OT no skilled needs    Plan to be discussed w/ Dr. Broussard  [id number]    ASSESSMENT AND PLAN: 43 year old female presents to PST prior to scheduled Left Craniotomy Removal Meningioma with Dr. Perez on 1/12/2024. PMhx thyroid cancer, status post partial thyroidectomy 12 years ago, childhood asthma, status post COVID-19 infection June 2022 and received monoclonal antibody infusion. PShx cholecystectomy, removal of fibroid x2. C/o headaches, vertigo-like symptoms for close to a year. She went into a sauna approximately a month ago, felt dizzy lightheaded increased heart rate and had transient loss of consciousness. Patient had an MRI and was found to have a left-sided meningioma. Denies any chest pain, palpitation at this, denies SOB, N/V, fever or chills.    Of note: pt had thyroidectomy 12 years ago s/p radioactive Iodine. 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 She denies any choking or aspiration but states she has to eat in very small bites slowly. (pt was discussed with Dr. Erickson) RAMIREZ note in chart. Pt. also reports recent palpitation episode in 2022 and 2023, she was seen by Dr. Tellez wore halter monitor for one week (12/22/23-12/27/23). As per pt no abnormal results    s/p left craniotomy for tumor resection 1/12/24    NEURO:   - Continue neuro checks q 4  - F/u Surgical Pathology  - Decadron for cerebral edema - will be a 7 day taper, tapered to 4q8 today 1/15  - MRI Brain w/wo post-op done yesterday, awaiting official report but reviewed by Dr. Broussard  -  SEKOU removed yesterday at bedside  - Keppra for seizure prophylaxis  - Pain control w/ Tylenol prn and Oxycodone prn  - PT - pending further evaluation, OT: no skilled OT needs, activity increase as tolerated    PULM:   - On room air, O2Sat>96%  - Incentive spirometry    CV:  - -160, no issues    ENDO:   - A1c 5.3, monitor glucose while on steroids, stable  - Continue Synthroid for hypothyroidism    HEME/ONC:             1/15 CBC: downtrending leukocytosis, on steroids, afebrile, stable post-op anemia         DVT ppx: 1/13 Le Dopp negative, SCDs, SQL    RENAL:   - IVL  - 1/15 BMP stable  - Encarnacion d/c'd 1/14, currently voiding    ID:   - Afebrile  - Received post-op abx  - 12/28 MRSA/MSSA negative    GI:    - Continue oral diet  - Senna and Miralax for bowel regimen  - Protonix while on steroids    DISCHARGE PLANNING:   PT - pending further ambulation, OT no skilled needs    Plan to be discussed w/ Dr. Broussard  [id number]    ASSESSMENT AND PLAN: 43 year old female presents to PST prior to scheduled Left Craniotomy Removal Meningioma with Dr. Perez on 1/12/2024. PMhx thyroid cancer, status post partial thyroidectomy 12 years ago, childhood asthma, status post COVID-19 infection June 2022 and received monoclonal antibody infusion. PShx cholecystectomy, removal of fibroid x2. C/o headaches, vertigo-like symptoms for close to a year. She went into a sauna approximately a month ago, felt dizzy lightheaded increased heart rate and had transient loss of consciousness. Patient had an MRI and was found to have a left-sided meningioma. Denies any chest pain, palpitation at this, denies SOB, N/V, fever or chills.    Of note: pt had thyroidectomy 12 years ago s/p radioactive Iodine. 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 She denies any choking or aspiration but states she has to eat in very small bites slowly. (pt was discussed with Dr. Erickson) RAMIREZ note in chart. Pt. also reports recent palpitation episode in 2022 and 2023, she was seen by Dr. Tellez wore halter monitor for one week (12/22/23-12/27/23). As per pt no abnormal results    s/p left craniotomy for tumor resection 1/12/24    NEURO:   - Continue neuro checks q 4  - F/u Surgical Pathology  - Decadron for cerebral edema - will be a 7 day taper, tapered to 4q8 today 1/15  - MRI Brain w/wo post-op done yesterday, awaiting official report but reviewed by Dr. Broussard  -  SEKOU removed yesterday at bedside  - Keppra for seizure prophylaxis  - Pain control w/ Tylenol prn and Oxycodone prn  - PT - pending further evaluation, OT: no skilled OT needs, activity increase as tolerated    PULM:   - On room air, O2Sat>96%  - Incentive spirometry    CV:  - -160, no issues    ENDO:   - A1c 5.3, monitor glucose while on steroids, stable  - Continue Synthroid for hypothyroidism    HEME/ONC:             1/15 CBC: downtrending leukocytosis, on steroids, afebrile, stable post-op anemia         DVT ppx: 1/13 Le Dopp negative, SCDs, SQL    RENAL:   - IVL  - 1/15 BMP stable  - Encarnacion d/c'd 1/14, currently voiding    ID:   - Afebrile  - Received post-op abx  - 12/28 MRSA/MSSA negative    GI:    - Continue oral diet  - Senna and Miralax for bowel regimen  - Protonix while on steroids    DISCHARGE PLANNING:   PT - pending further ambulation, OT no skilled needs    Plan to be discussed w/ Dr. Broussard  [id number]    ASSESSMENT AND PLAN: 43 year old female presents to PST prior to scheduled Left Craniotomy Removal Meningioma with Dr. Perez on 1/12/2024. PMhx thyroid cancer, status post partial thyroidectomy 12 years ago, childhood asthma, status post COVID-19 infection June 2022 and received monoclonal antibody infusion. PShx cholecystectomy, removal of fibroid x2. C/o headaches, vertigo-like symptoms for close to a year. She went into a sauna approximately a month ago, felt dizzy lightheaded increased heart rate and had transient loss of consciousness. Patient had an MRI and was found to have a left-sided meningioma. Denies any chest pain, palpitation at this, denies SOB, N/V, fever or chills.    Of note: pt had thyroidectomy 12 years ago s/p radioactive Iodine. 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 She denies any choking or aspiration but states she has to eat in very small bites slowly. (pt was discussed with Dr. Erickson) RAMIREZ note in chart. Pt. also reports recent palpitation episode in 2022 and 2023, she was seen by Dr. Tellez wore halter monitor for one week (12/22/23-12/27/23). As per pt no abnormal results    s/p left craniotomy for tumor resection 1/12/24    NEURO:   - Continue neuro checks q 4  - F/u Surgical Pathology  - Decadron for cerebral edema - will be a 7 day taper, tapered to 4q8 today 1/15  - MRI Brain w/wo post-op done yesterday, awaiting official report but reviewed by Dr. Broussard  -  SEKOU removed yesterday at bedside  - Keppra for seizure prophylaxis  - Pain control w/ Tylenol prn and Oxycodone prn  - PT - pending further evaluation, OT: no skilled OT needs, activity increase as tolerated    PULM:   - On room air, O2Sat>96%  - Incentive spirometry    CV:  - -160, no issues    ENDO:   - A1c 5.3, monitor glucose while on steroids, stable  - Continue Synthroid for hypothyroidism    HEME/ONC:             1/15 CBC: downtrending leukocytosis, on steroids, afebrile, stable post-op anemia         DVT ppx: 1/13 Le Dopp negative, SCDs, SQL    RENAL:   - IVL  - 1/15 BMP stable  - Encarnacion d/c'd 1/14, currently voiding    ID:   - Afebrile  - Received post-op abx  - 12/28 MRSA/MSSA negative    GI:    - Continue oral diet  - Senna and Miralax for bowel regimen  - Protonix while on steroids    DISCHARGE PLANNING:   PT - pending further ambulation, OT no skilled needs    Plan to be discussed w/ Dr. Broussard  [id number]

## 2024-01-15 NOTE — DISCHARGE NOTE PROVIDER - HOSPITAL COURSE
43 year old female presents to PST prior to scheduled Left Craniotomy Removal Meningioma with Dr. Perez on 1/12/2024. PMhx thyroid cancer, status post partial thyroidectomy 12 years ago, childhood asthma, status post COVID-19 infection June 2022 and received monoclonal antibody infusion. PShx cholecystectomy, removal of fibroid x2. C/o headaches, vertigo-like symptoms for close to a year. She went into a sauna approximately a month ago, felt dizzy lightheaded increased heart rate and had transient loss of consciousness. Patient had an MRI and was found to have a left-sided meningioma. Denies any chest pain, palpitation at this, denies SOB, N/V, fever or chills.    Of note: pt had thyroidectomy 12 years ago s/p radioactive Iodine. 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 She denies any choking or aspiration but states she has to eat in very small bites slowly. (pt was discussed with Dr. Erickson) RAMIREZ note in chart. Pt. also reports recent palpitation episode in 2022 and 2023, she was seen by Dr. Tellez wore halter monitor for one week (12/22/23-12/27/23). As per pt no abnormal results    Patient went for an elective s/p left craniotomy for tumor resection 1/12/24 with Dr. Broussard, patient tolerated procedure well and was in NSCU for post-op care and management. Patient had a CT Brain post-op which was stable. She was on Decadron and Keppra post-op. On 1/14 patient transitioned to the floor. She had her MRI which was _____. Her subgaleal drain was removed on 1/14. She was evaluated by Physical Therapy and Occupational Therapy and deemed a candidate for _____.    On day of discharge patient is neurologically and hemodynamically stable. 43 year old female presents to PST prior to scheduled Left Craniotomy Removal Meningioma with Dr. Perez on 1/12/2024. PMhx thyroid cancer, status post partial thyroidectomy 12 years ago, childhood asthma, status post COVID-19 infection June 2022 and received monoclonal antibody infusion. PShx cholecystectomy, removal of fibroid x2. C/o headaches, vertigo-like symptoms for close to a year. She went into a sauna approximately a month ago, felt dizzy lightheaded increased heart rate and had transient loss of consciousness. Patient had an MRI and was found to have a left-sided meningioma. Denies any chest pain, palpitation at this, denies SOB, N/V, fever or chills.    Of note: pt had thyroidectomy 12 years ago s/p radioactive Iodine. 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 She denies any choking or aspiration but states she has to eat in very small bites slowly. (pt was discussed with Dr. Erickson) ARMIREZ note in chart. Pt. also reports recent palpitation episode in 2022 and 2023, she was seen by Dr. Tellez wore halter monitor for one week (12/22/23-12/27/23). As per pt no abnormal results    Patient went for an elective s/p left craniotomy for tumor resection 1/12/24 with Dr. Broussard, patient tolerated procedure well and was in NSCU for post-op care and management. Patient had a CT Brain post-op which was stable. She was on Decadron and Keppra post-op. On 1/14 patient transitioned to the floor. She had her MRI which was _____. Her subgaleal drain was removed on 1/14. She was evaluated by Physical Therapy and Occupational Therapy and deemed a candidate for _____.    On day of discharge patient is neurologically and hemodynamically stable. 43 year old female presents to PST prior to scheduled Left Craniotomy Removal Meningioma with Dr. ePrez on 1/12/2024. PMhx thyroid cancer, status post partial thyroidectomy 12 years ago, childhood asthma, status post COVID-19 infection June 2022 and received monoclonal antibody infusion. PShx cholecystectomy, removal of fibroid x2. C/o headaches, vertigo-like symptoms for close to a year. She went into a sauna approximately a month ago, felt dizzy lightheaded increased heart rate and had transient loss of consciousness. Patient had an MRI and was found to have a left-sided meningioma. Denies any chest pain, palpitation at this, denies SOB, N/V, fever or chills.    Of note: pt had thyroidectomy 12 years ago s/p radioactive Iodine. 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 She denies any choking or aspiration but states she has to eat in very small bites slowly. (pt was discussed with Dr. Erickson) RAMIREZ note in chart. Pt. also reports recent palpitation episode in 2022 and 2023, she was seen by Dr. Tellez wore halter monitor for one week (12/22/23-12/27/23). As per pt no abnormal results    Patient went for an elective s/p left craniotomy for tumor resection 1/12/24 with Dr. Broussard, patient tolerated procedure well and was in NSCU for post-op care and management. Patient had a CT Brain post-op which was stable. She was on Decadron and Keppra post-op. On 1/14 patient transitioned to the floor. She had her MRI which was _____. Her subgaleal drain was removed on 1/14. She was evaluated by Physical Therapy and Occupational Therapy and deemed a candidate for _____.    On day of discharge patient is neurologically and hemodynamically stable. 43 year old female presents to PST prior to scheduled Left Craniotomy Removal Meningioma with Dr. Perez on 1/12/2024. PMhx thyroid cancer, status post partial thyroidectomy 12 years ago, childhood asthma, status post COVID-19 infection June 2022 and received monoclonal antibody infusion. PShx cholecystectomy, removal of fibroid x2. C/o headaches, vertigo-like symptoms for close to a year. She went into a sauna approximately a month ago, felt dizzy lightheaded increased heart rate and had transient loss of consciousness. Patient had an MRI and was found to have a left-sided meningioma. Denies any chest pain, palpitation at this, denies SOB, N/V, fever or chills.    Of note: pt had thyroidectomy 12 years ago s/p radioactive Iodine. 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 She denies any choking or aspiration but states she has to eat in very small bites slowly. (pt was discussed with Dr. Erickson) RAMIREZ note in chart. Pt. also reports recent palpitation episode in 2022 and 2023, she was seen by Dr. Tellez wore halter monitor for one week (12/22/23-12/27/23). As per pt no abnormal results    Patient went for an elective s/p left craniotomy for tumor resection 1/12/24 with Dr. Broussard, patient tolerated procedure well and was in NSCU for post-op care and management. Patient had a CT Brain post-op which was stable. She was on Decadron and Keppra post-op. 1/13/24 surveillance duplex was negative for DVT. On 1/14 patient transitioned to the floor. She had her MRI which showed post operative changes. Her subgaleal drain was removed on 1/14. She was evaluated by Physical Therapy and Occupational Therapy and recommended no skilled needs.     On the day of discharge she is medically and neurologically stable for discharge to home.

## 2024-01-15 NOTE — DISCHARGE NOTE PROVIDER - NSDCMRMEDTOKEN_GEN_ALL_CORE_FT
Albuterol (Eqv-ProAir HFA) 90 mcg/inh inhalation aerosol: 2 puff(s) inhaled  Breztri Aerosphere 160 mcg-9 mcg-4.8 mcg/inh inhalation aerosol: 2 puff(s) inhaled  calcitriol 0.5 mcg oral capsule: 1 cap(s) orally once a day  famotidine 40 mg oral tablet: 1 tab(s) orally  Junel Fe 24 oral tablet: 1 tab(s) orally once a day  levothyroxine 175 mcg (0.175 mg) oral tablet: 1 tab(s) orally once a day  olopatadine 665 mcg/inh nasal spray: 2 spray(s) intranasally  pantoprazole 40 mg oral delayed release tablet: 1 tab(s) orally  zinc (as acetate) 25 mg oral capsule: 1 cap(s) orally   acetaminophen 325 mg oral tablet: 2 tab(s) orally every 6 hours As needed Temp greater or equal to 38C (100.4F), Mild Pain (1 - 3)  Albuterol (Eqv-ProAir HFA) 90 mcg/inh inhalation aerosol: 2 puff(s) inhaled  Breztri Aerosphere 160 mcg-9 mcg-4.8 mcg/inh inhalation aerosol: 2 puff(s) inhaled  calcitriol 0.5 mcg oral capsule: 1 cap(s) orally once a day  dexAMETHasone 2 mg oral tablet: 1 tab(s) orally 3 times a day x 2 days; then 1 tab PO 2x a day x 2 days; then 1 tab PO daily x 2 days and then stop.  famotidine 40 mg oral tablet: 1 tab(s) orally once a day  Junel Fe 24 oral tablet: 1 tab(s) orally once a day  levETIRAcetam 500 mg oral tablet: 1 tab(s) orally every 12 hours  levothyroxine 175 mcg (0.175 mg) oral tablet: 1 tab(s) orally once a day  olopatadine 665 mcg/inh nasal spray: 2 spray(s) intranasally  pantoprazole 40 mg oral delayed release tablet: 1 tab(s) orally once a day  polyethylene glycol 3350 oral powder for reconstitution: 17 gram(s) orally once a day  senna leaf extract oral tablet: 1 tab(s) orally once a day (at bedtime)  traMADol 50 mg oral tablet: 0.5 tab(s) orally every 8 hours as needed for  severe pain MDD: 2  zinc (as acetate) 25 mg oral capsule: 1 cap(s) orally

## 2024-01-15 NOTE — DISCHARGE NOTE PROVIDER - NSDCCPCAREPLAN_GEN_ALL_CORE_FT
PRINCIPAL DISCHARGE DIAGNOSIS  Diagnosis: Neoplasm of meninges  Assessment and Plan of Treatment: s/p left craniotomy for tumor resection 1/12/24  Please make an appointment and follow up with Dr. Broussard in 1-2 weeks after discharge. You have surgical staples in place that will be removed on your follow appointment. You may shower however do NOT scrub at incision site, pat dry only. Keep surgical incision clean and dry. Please follow directions and take your Decadron (steroid) as a tapered dose. Decadron helps with swelling.  Some common side effects of decadron include increased appetite, irritability, difficulty sleeping, swelling in your ankles, heartburn, and increased sugars.  Please notify your doctor of any side effects. Please continue to take Keppra for seizure prophylaxis. Keppra helps control and prevent seizures.  The most common side effects include diarrhea or constipation, excessive sleepiness, irritability and mood changes.  Rare and sometimes serious side effects include rash. You may take Tylenol (Acetaminophen) as needed for mild pain or Oxycodone as needed for moderate to severe pain. Oxycodone helps to control pain. Oxycodone is an additive medication so it is important to limit the use of this medication.  Side effects include nausea, vomiting, constipation, dry mouth, lightheadedness, dizziness or drowsiness.  It is important to tell your physician if you experience any of these side effects. Please do NOT take any NSAIDs (Advil, Aleve, Motrin, Ibuprofen) as these medication can increase your risk of bleeding after surgery.      SECONDARY DISCHARGE DIAGNOSES  Diagnosis: Hypothyroidism  Assessment and Plan of Treatment: Please continue to take your Synthroid as prescribed by your doctor. Please make an appointment and follow up with your primary care provider in 1-2 weeks.

## 2024-01-15 NOTE — DISCHARGE NOTE PROVIDER - NSDCFUSCHEDAPPT_GEN_ALL_CORE_FT
Tj Chatterjee  [facility]  [address]NG [address] [facility]  Scheduled Appointment: 02/13/2024    [facility]  CARDIOLOGY [address] [facility]   Scheduled Appointment: 02/14/2024    [facility]  [address]ED [address] [facility]  Scheduled Appointment: 03/22/2024    Dago Cooper  [facility]  PULED [address] [facility]  Scheduled Appointment: 03/22/2024     Tj Chatterjee  [facility]  [address] [facility]  Scheduled Appointment: 02/13/2024    [facility]  CARDIOLOGY [address] [facility]   Scheduled Appointment: 02/14/2024    [facility]  [address] [facility]  Scheduled Appointment: 03/22/2024    Dago Cooper  [facility]  [address]ED [address] [facility]  Scheduled Appointment: 03/22/2024     Tj Chatterjee  [facility]  [address] [facility]  Scheduled Appointment: 02/13/2024    [facility]  CARDIOLOGY [address] [facility]   Scheduled Appointment: 02/14/2024    [facility]  [address] [address] [facility]  Scheduled Appointment: 03/22/2024    Dago Cooper  [facility]  [address]ULED [address] [facility]  Scheduled Appointment: 03/22/2024     Tj Chatterjee  [facility]  [address] [facility]  Scheduled Appointment: 02/13/2024    [facility]  CARDIOLOGY [address] [facility]   Scheduled Appointment: 02/14/2024    [facility]  [address] [facility]  Scheduled Appointment: 03/22/2024    Dago Cooper  [facility]  [address]ED [address] [facility]  Scheduled Appointment: 03/22/2024

## 2024-01-16 ENCOUNTER — TRANSCRIPTION ENCOUNTER (OUTPATIENT)
Age: 44
End: 2024-01-16

## 2024-01-16 VITALS — DIASTOLIC BLOOD PRESSURE: 77 MMHG | HEART RATE: 89 BPM | OXYGEN SATURATION: 97 % | SYSTOLIC BLOOD PRESSURE: 141 MMHG

## 2024-01-16 PROCEDURE — 85027 COMPLETE CBC AUTOMATED: CPT

## 2024-01-16 PROCEDURE — 88360 TUMOR IMMUNOHISTOCHEM/MANUAL: CPT

## 2024-01-16 PROCEDURE — 97530 THERAPEUTIC ACTIVITIES: CPT

## 2024-01-16 PROCEDURE — 82962 GLUCOSE BLOOD TEST: CPT

## 2024-01-16 PROCEDURE — 36415 COLL VENOUS BLD VENIPUNCTURE: CPT

## 2024-01-16 PROCEDURE — C1889: CPT

## 2024-01-16 PROCEDURE — 86900 BLOOD TYPING SEROLOGIC ABO: CPT

## 2024-01-16 PROCEDURE — 85730 THROMBOPLASTIN TIME PARTIAL: CPT

## 2024-01-16 PROCEDURE — 70553 MRI BRAIN STEM W/O & W/DYE: CPT

## 2024-01-16 PROCEDURE — 80048 BASIC METABOLIC PNL TOTAL CA: CPT

## 2024-01-16 PROCEDURE — 81025 URINE PREGNANCY TEST: CPT

## 2024-01-16 PROCEDURE — 97116 GAIT TRAINING THERAPY: CPT

## 2024-01-16 PROCEDURE — 85610 PROTHROMBIN TIME: CPT

## 2024-01-16 PROCEDURE — 97165 OT EVAL LOW COMPLEX 30 MIN: CPT

## 2024-01-16 PROCEDURE — 88307 TISSUE EXAM BY PATHOLOGIST: CPT

## 2024-01-16 PROCEDURE — C1713: CPT

## 2024-01-16 PROCEDURE — 84100 ASSAY OF PHOSPHORUS: CPT

## 2024-01-16 PROCEDURE — 80053 COMPREHEN METABOLIC PANEL: CPT

## 2024-01-16 PROCEDURE — 88342 IMHCHEM/IMCYTCHM 1ST ANTB: CPT

## 2024-01-16 PROCEDURE — 83735 ASSAY OF MAGNESIUM: CPT

## 2024-01-16 PROCEDURE — 70450 CT HEAD/BRAIN W/O DYE: CPT

## 2024-01-16 PROCEDURE — 86850 RBC ANTIBODY SCREEN: CPT

## 2024-01-16 PROCEDURE — 93970 EXTREMITY STUDY: CPT

## 2024-01-16 PROCEDURE — 88311 DECALCIFY TISSUE: CPT

## 2024-01-16 PROCEDURE — C1769: CPT

## 2024-01-16 PROCEDURE — 86901 BLOOD TYPING SEROLOGIC RH(D): CPT

## 2024-01-16 PROCEDURE — 97162 PT EVAL MOD COMPLEX 30 MIN: CPT

## 2024-01-16 PROCEDURE — C9399: CPT

## 2024-01-16 RX ORDER — ACETAMINOPHEN 500 MG
2 TABLET ORAL
Qty: 0 | Refills: 0 | DISCHARGE
Start: 2024-01-16

## 2024-01-16 RX ORDER — TRAMADOL HYDROCHLORIDE 50 MG/1
0.5 TABLET ORAL
Qty: 5 | Refills: 0
Start: 2024-01-16

## 2024-01-16 RX ORDER — LEVETIRACETAM 250 MG/1
1 TABLET, FILM COATED ORAL
Qty: 60 | Refills: 0
Start: 2024-01-16

## 2024-01-16 RX ORDER — FAMOTIDINE 10 MG/ML
1 INJECTION INTRAVENOUS
Qty: 0 | Refills: 0 | DISCHARGE

## 2024-01-16 RX ORDER — SENNA PLUS 8.6 MG/1
1 TABLET ORAL
Qty: 0 | Refills: 0 | DISCHARGE
Start: 2024-01-16

## 2024-01-16 RX ORDER — DEXAMETHASONE 0.5 MG/5ML
1 ELIXIR ORAL
Qty: 12 | Refills: 0
Start: 2024-01-16

## 2024-01-16 RX ORDER — POLYETHYLENE GLYCOL 3350 17 G/17G
17 POWDER, FOR SOLUTION ORAL
Qty: 0 | Refills: 0 | DISCHARGE
Start: 2024-01-16

## 2024-01-16 RX ORDER — PANTOPRAZOLE SODIUM 20 MG/1
1 TABLET, DELAYED RELEASE ORAL
Qty: 0 | Refills: 0 | DISCHARGE

## 2024-01-16 RX ADMIN — Medication 175 MICROGRAM(S): at 05:12

## 2024-01-16 RX ADMIN — Medication 4 MILLIGRAM(S): at 05:12

## 2024-01-16 RX ADMIN — Medication 650 MILLIGRAM(S): at 00:51

## 2024-01-16 RX ADMIN — LEVETIRACETAM 500 MILLIGRAM(S): 250 TABLET, FILM COATED ORAL at 05:12

## 2024-01-16 RX ADMIN — CALCITRIOL 0.5 MICROGRAM(S): 0.5 CAPSULE ORAL at 12:12

## 2024-01-16 RX ADMIN — Medication 650 MILLIGRAM(S): at 05:52

## 2024-01-16 RX ADMIN — Medication 4 MILLIGRAM(S): at 12:11

## 2024-01-16 RX ADMIN — Medication 650 MILLIGRAM(S): at 12:24

## 2024-01-16 RX ADMIN — PANTOPRAZOLE SODIUM 40 MILLIGRAM(S): 20 TABLET, DELAYED RELEASE ORAL at 05:12

## 2024-01-16 NOTE — DISCHARGE NOTE NURSING/CASE MANAGEMENT/SOCIAL WORK - NSDCPEFALRISK_GEN_ALL_CORE
For information on Fall & Injury Prevention, visit: [email or web address] OR  [email or web address] OR  https://www.cdc.gov/steadi/patient.html For information on Fall & Injury Prevention, visit: [email or web address] OR  [email or web address] OR  https://www.cdc.gov/steadi/patient.html

## 2024-01-16 NOTE — DISCHARGE NOTE NURSING/CASE MANAGEMENT/SOCIAL WORK - PATIENT PORTAL LINK FT
You can access the FollowMyHealth Patient Portal offered by [facility] by registering at the following website: [email or web address]. By joining [employer]’s FollowMyHealth portal, you will also be able to view your health information using other applications (apps) compatible with our system. You can access the FollowMyHealth Patient Portal offered by [facility] by registering at the following website: [email or web address]. By joining [employer]’s FollowMyHealth portal, you will also be able to view your health information using other applications (apps) compatible with our system.

## 2024-01-16 NOTE — PROGRESS NOTE ADULT - ASSESSMENT
ASSESSMENT AND PLAN: 43 year old female presents to PST prior to scheduled Left Craniotomy Removal Meningioma with Dr. Perez on 1/12/2024. PMhx thyroid cancer, status post partial thyroidectomy 12 years ago, childhood asthma, status post COVID-19 infection June 2022 and received monoclonal antibody infusion. PShx cholecystectomy, removal of fibroid x2. C/o headaches, vertigo-like symptoms for close to a year. She went into a sauna approximately a month ago, felt dizzy lightheaded increased heart rate and had transient loss of consciousness. Patient had an MRI and was found to have a left-sided meningioma. Denies any chest pain, palpitation at this, denies SOB, N/V, fever or chills.    Of note: pt had thyroidectomy 12 years ago s/p radioactive Iodine. 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 She denies any choking or aspiration but states she has to eat in very small bites slowly. (pt was discussed with Dr. Erickson) RAMIREZ note in chart. Pt. also reports recent palpitation episode in 2022 and 2023, she was seen by Dr. Tellez wore halter monitor for one week (12/22/23-12/27/23). As per pt no abnormal results    s/p left craniotomy for tumor resection 1/12/24    NEURO:   - Continue neuro checks q 4  - F/u Surgical Pathology  - Decadron for cerebral edema - will be a 7 day taper, tapered to 4q8 today 1/15  - MRI Brain w/wo post-op as above.  - SG SEKOU removed yesterday at bedside  - Keppra for seizure prophylaxis  - Pain control w/ Tylenol prn and Oxycodone prn  - PT - no skilled PT needs; OT: no skilled OT needs, activity increase as tolerated    PULM:   - On room air, O2Sat>96%  - Incentive spirometry    CV:  - -160, no issues    ENDO:   - A1c 5.3, monitor glucose while on steroids, stable  - Continue Synthroid for hypothyroidism    HEME/ONC:             1/15 CBC: downtrending leukocytosis, on steroids, afebrile, stable post-op anemia         DVT ppx: 1/13 Le Dopp negative, SCDs, SQL    RENAL:   - IVL  - 1/15 BMP stable  - Encarnacion d/c'd 1/14, currently voiding    ID:   - Afebrile  - Received post-op abx  - 12/28 MRSA/MSSA negative    GI:    - Continue oral diet  - Senna and Miralax for bowel regimen  - Protonix while on steroids    d/c IVL and d/c home today, no skilled needs   RXs sent to [facility]  Discussed with patient and family wound care, follow up plans, activity, and medications. Questions answered, and they verbalized understanding.     discussed w/ Dr. Broussard  teams MARIANO Evans

## 2024-01-16 NOTE — PROGRESS NOTE ADULT - SUBJECTIVE AND OBJECTIVE BOX
SUBJECTIVE: HPI:  43 year old female presents to PST prior to scheduled Left Craniotomy Removal Meningioma with Dr. Perez on 1/12/2024. PMhx thyroid cancer, status post partial thyroidectomy 12 years ago, childhood asthma, status post COVID-19 infection June 2022 and received monoclonal antibody infusion. PShx cholecystectomy, removal of fibroid x2. C/o headaches, vertigo-like symptoms for close to a year. She went into a sauna approximately a month ago, felt dizzy lightheaded increased heart rate and had transient loss of consciousness. Patient had an MRI and was found to have a left-sided meningioma. Denies any chest pain, palpitation at this, denies SOB, N/V, fever or chills.    Of note: pt had thyroidectomy 12 years ago s/p radioactive Iodine. 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 She denies any choking or aspiration but states she has to eat in very small bites slowly. (pt was discussed with Dr. Erickson) RAMIREZ note in chart. Pt. also reports recent palpitation episode in 2022 and 2023, she was seen by Dr. Tellez wore halter monitor for one week (12/22/23-12/27/23). As per pt no abnormal results. (28 Dec 2023 07:13)      OVERNIGHT EVENTS: patient seen ambulating in halls, in NAD, states pain controlled with tylenol doesn't want stronger oxy  anxious to go home    PHYSICAL EXAM:    General: No Acute Distress     Neurological: Awake, OX3 (name, place, date), PERRL, EOMI, no drift, following commands, no drift, uppers 5/5, lowers 5/5, SILT    Pulmonary: Clear to Auscultation, No Rales, No Rhonchi, No Wheezes     Cardiovascular: S1, S2, Regular Rate and Rhythm     Gastrointestinal: Soft, Nontender, Nondistended     Incision: Left craniotomy incision has surgical staples in place, C/D/I, has one surgical staple at previous surgical drain site    LABS:                        10.2   15.13 )-----------( 284      ( 15 Mychal 2024 05:09 )             30.3    01-15    138  |  103  |  13  ----------------------------<  122<H>  4.1   |  24  |  0.67    Ca    7.4<L>      15 Mychal 2024 05:09    MEDICATIONS  (STANDING):  calcitriol   Capsule 0.5 MICROGram(s) Oral daily  chlorhexidine 4% Liquid 1 Application(s) Topical daily  dexAMETHasone     Tablet 4 milliGRAM(s) Oral every 8 hours  enoxaparin Injectable 40 milliGRAM(s) SubCutaneous <User Schedule>  Junel Fe 1.5/30 28 days 1 Tablet(s) 1 Tablet(s) Oral daily  levETIRAcetam 500 milliGRAM(s) Oral every 12 hours  levothyroxine 175 MICROGram(s) Oral daily  pantoprazole    Tablet 40 milliGRAM(s) Oral before breakfast  polyethylene glycol 3350 17 Gram(s) Oral daily  senna 1 Tablet(s) Oral at bedtime    MEDICATIONS  (PRN):  acetaminophen     Tablet .. 650 milliGRAM(s) Oral every 6 hours PRN Temp greater or equal to 38C (100.4F), Mild Pain (1 - 3)    DIET: [x] Regular [] CCD [] Renal [] Puree [] Dysphagia [] Tube Feeds:     IMAGING:   < from: MR Head w/wo IV Cont (01.14.24 @ 15:59) >  INTERPRETATION:  Clinical indication: Status post meningioma resection.    MRI of the brain was performed sagittal T1 axial T1 T2 T2 FLAIR diffusion   and susceptibility ability weighted sequence. The patient was injected   with approximately 6 cc gadavist IV with 1 cc of contrast discarded.   Sagittal coronal and axial T1-weighted sequence was performed    This exam is compared with priorpreop brain MRI performed on December 14, 2023.    Postop changes compatible with a left frontal craniotomy is again seen.   Previously noted dural based lesion appears to have been resected. There   is some areas of T1 shortening identified in thehigh left frontal   cortex. This does demonstrate associated area of decreased signal which   could be compatible with associated areas of air. This area of T1   shortening could be compatible area of hemorrhage and or postop material.   This findingmeasures approximately 3.6 x 2.2 cm. Continued close   interval follow-up is recommended    Surrounding edema is again identified. There is localized mass effect   seen consisting of sulcal effacement. Minimal left-to-right shift (2.4   mm). Subtle area of T2 prolongation with restricted diffusion is seen   adjacent postoperative bed which could be compatible with postop changes.   Abnormal susceptibility in the postoperative region is identified as well    There is evidence of subtle increased signal seen involving the left   posterior frontal and parietal sulci on the axial T2 FLAIR sequence which   could be compatible areas of subarachnoid hemorrhage.    The large vessels appeared normal flow voids    The visualized paranasal sinuses mastoid and middle ear regions appear   clear.    Extracalvarial stables are seen involving the high left frontal region.    IMPRESSION: Postop changes as described above.    < end of copied text >          < from: CT Head No Cont (01.13.24 @ 09:37) >  IMPRESSION:  Status post left frontal craniotomy and resection of the left extra-axial   lesion with postoperative changes including pneumocephalus, hemorrhage,   and edema within the left frontal lobe. Small residual dense tissue   within the postoperative bed, possibly residual tumor. Further evaluation   can be obtained with MRI with IV contrast after immediate postoperative   changes have resolved.    Postoperative drain within the left frontal scalp.      Findings regarding possible residual tumor were discussed by Dr. Conway   with Dr. Delarosa on 1/13/2024 at 2:13 PM with read back confirmation.    --- End of Report ---     JESS CONWAY MD; Resident Radiologist  This document has been electronically signed.  ANEUDY PEREZ MD; Attending Radiologist  This document has been electronically signed. Jan 13 2024  3:05PM    < end of copied text >

## 2024-01-16 NOTE — PROGRESS NOTE ADULT - THIS PATIENT HAS THE FOLLOWING CONDITION(S)/DIAGNOSES ON THIS ADMISSION:
Cerebral Edema
None
post-op anemia stable/Cerebral Edema/Acute Blood Loss Anemia
Cerebral Edema
post-op anemia stable/Cerebral Edema/Acute Blood Loss Anemia
Cerebral Edema
post-op anemia, stable/Cerebral Edema/Acute Blood Loss Anemia

## 2024-01-17 ENCOUNTER — TRANSCRIPTION ENCOUNTER (OUTPATIENT)
Age: 44
End: 2024-01-17

## 2024-01-19 LAB — SURGICAL PATHOLOGY STUDY: SIGNIFICANT CHANGE UP

## 2024-01-21 PROBLEM — R55 SYNCOPE AND COLLAPSE: Status: ACTIVE | Noted: 2023-12-11

## 2024-01-22 ENCOUNTER — NON-APPOINTMENT (OUTPATIENT)
Age: 44
End: 2024-01-22

## 2024-01-23 ENCOUNTER — APPOINTMENT (OUTPATIENT)
Dept: SPINE | Facility: CLINIC | Age: 44
End: 2024-01-23
Payer: COMMERCIAL

## 2024-01-23 VITALS
SYSTOLIC BLOOD PRESSURE: 127 MMHG | OXYGEN SATURATION: 97 % | BODY MASS INDEX: 20.73 KG/M2 | HEIGHT: 63 IN | WEIGHT: 117 LBS | DIASTOLIC BLOOD PRESSURE: 80 MMHG | HEART RATE: 87 BPM

## 2024-01-23 PROCEDURE — 99024 POSTOP FOLLOW-UP VISIT: CPT

## 2024-01-23 RX ORDER — LEVETIRACETAM 500 MG/1
500 TABLET, FILM COATED ORAL
Refills: 0 | Status: ACTIVE | COMMUNITY

## 2024-01-23 RX ORDER — GUAIFENESIN 1200 MG/1
500 TABLET, EXTENDED RELEASE ORAL
Refills: 0 | Status: ACTIVE | COMMUNITY

## 2024-01-23 NOTE — HISTORY OF PRESENT ILLNESS
[FreeTextEntry1] : DEREK BAKER is a 43 year old lady who had an episode of dizziness with some nausea and a near syncopal episode recently.  This led to an MRI that showed a left frontolateral convexity meningioma with mass effect and edema.  She underwent a left frontoparietal temporal craniotomy for removal of the meningioma which as a Lemon grade 1 resection on 01/12/2014.  The pathology is an atypical meningioma, WHO grade 2.

## 2024-01-23 NOTE — ASSESSMENT
[FreeTextEntry1] : The pre and postoperative MRIs were reviewed with the patient and her .  The staples were removed.  Wound care was discussed.  The patient may shower and use a gentle shampoo.  She is to rinse and pat the incision dry gently and may undo the bryant and brush her hair carefully as to avoid the incision.  Activity levels were discussed.  The patient is to avoid bending and lifting.  She may walk frequently throughout the day.  She will return in 2 weeks for a wound check and it is planned that she return to the office in 3 months with a new MRI of the brain with and without contrast for review.

## 2024-01-23 NOTE — REASON FOR VISIT
[Spouse] : spouse [de-identified] : 1.  Left frontoparietal temporal craniotomy.  2.  Removal of tumor Lemon grade 1 resection.  3.  Use of stereotactic guidance. [de-identified] : 01/12/2024 [de-identified] : 9 [de-identified] : 1 [de-identified] : The patient stated that she had some head discomfort which she mainly attributed to the incision.  She takes Tylenol 500 mg every 6 hours with good relief.  She has tapered from the Dexamethasone and continues to take Keppra 500 mg 2 x day.  She has had fine tremors of her hands since surgery but denies any weakness and has been ambulating frequently inside her home.

## 2024-01-23 NOTE — PHYSICAL EXAM
[General Appearance - Alert] : alert [General Appearance - In No Acute Distress] : in no acute distress [General Appearance - Well Developed] : well developed [General Appearance - Well Nourished] : well nourished [General Appearance - Well-Appearing] : healthy appearing [] : normal voice and communication [Clean] : clean [Healing Well] : healing well [Dry] : dry [Intact] : intact [Staple Intact] : closed with intact staples [Normal Skin] : normal [No Drainage] : without drainage [Normal Skin Turgor] : skin turgor was normal [Impaired Insight] : insight and judgment were intact [Oriented To Time, Place, And Person] : oriented to person, place, and time [Memory Recent] : recent memory was not impaired [Affect] : the affect was normal [Mood] : the mood was normal [Memory Remote] : remote memory was not impaired [Place] : oriented to place [Person] : oriented to person [Time] : oriented to time [Short Term Intact] : short term memory intact [Remote Intact] : remote memory intact [Span Intact] : the attention span was normal [Concentration Intact] : normal concentrating ability [Fluency] : fluency intact [Comprehension] : comprehension intact [Current Events] : adequate knowledge of current events [Past History] : adequate knowledge of personal past history [Vocabulary] : adequate range of vocabulary [Cranial Nerves Optic (II)] : visual acuity intact bilaterally,  pupils equal round and reactive to light [Cranial Nerves Oculomotor (III)] : extraocular motion intact [Cranial Nerves Trigeminal (V)] : facial sensation intact symmetrically [Cranial Nerves Facial (VII)] : face symmetrical [Cranial Nerves Vestibulocochlear (VIII)] : hearing was intact bilaterally [Cranial Nerves Glossopharyngeal (IX)] : tongue and palate midline [Cranial Nerves Accessory (XI - Cranial And Spinal)] : head turning and shoulder shrug symmetric [Cranial Nerves Hypoglossal (XII)] : there was no tongue deviation with protrusion [Motor Tone] : muscle tone was normal in all four extremities [Motor Strength] : muscle strength was normal in all four extremities [No Muscle Atrophy] : normal bulk in all four extremities [Sensation Tactile Decrease] : light touch was intact [Abnormal Walk] : normal gait [5] : S1 flexor hallucis longus 5/5 [Balance] : balance was intact [2+] : Patella left 2+ [Erythema] : not erythematous [Tender] : not tender [Warm] : not warm [Indurated] : not indurated [FreeTextEntry1] : Frontoparietal temporal incision. [Past-pointing] : there was no past-pointing [Tremor] : no tremor present

## 2024-01-26 ENCOUNTER — APPOINTMENT (OUTPATIENT)
Dept: INTERNAL MEDICINE | Facility: CLINIC | Age: 44
End: 2024-01-26
Payer: COMMERCIAL

## 2024-01-26 VITALS
RESPIRATION RATE: 15 BRPM | HEIGHT: 63 IN | DIASTOLIC BLOOD PRESSURE: 74 MMHG | HEART RATE: 87 BPM | WEIGHT: 117 LBS | BODY MASS INDEX: 20.73 KG/M2 | OXYGEN SATURATION: 98 % | TEMPERATURE: 98.9 F | SYSTOLIC BLOOD PRESSURE: 107 MMHG

## 2024-01-26 DIAGNOSIS — Z09 ENCOUNTER FOR FOLLOW-UP EXAMINATION AFTER COMPLETED TREATMENT FOR CONDITIONS OTHER THAN MALIGNANT NEOPLASM: ICD-10-CM

## 2024-01-26 DIAGNOSIS — E03.9 HYPOTHYROIDISM, UNSPECIFIED: ICD-10-CM

## 2024-01-26 PROCEDURE — 36415 COLL VENOUS BLD VENIPUNCTURE: CPT

## 2024-01-26 PROCEDURE — 99214 OFFICE O/P EST MOD 30 MIN: CPT

## 2024-01-26 PROCEDURE — G2211 COMPLEX E/M VISIT ADD ON: CPT

## 2024-01-26 NOTE — PHYSICAL EXAM
[No Acute Distress] : no acute distress [Normal Sclera/Conjunctiva] : normal sclera/conjunctiva [Well-Appearing] : well-appearing [PERRL] : pupils equal round and reactive to light [EOMI] : extraocular movements intact [No Respiratory Distress] : no respiratory distress  [No Accessory Muscle Use] : no accessory muscle use [Clear to Auscultation] : lungs were clear to auscultation bilaterally [Normal Rate] : normal rate  [Regular Rhythm] : with a regular rhythm [Normal S1, S2] : normal S1 and S2 [No Edema] : there was no peripheral edema [Speech Grossly Normal] : speech grossly normal [Alert and Oriented x3] : oriented to person, place, and time [de-identified] : josh's sign negative  [de-identified] : surgical incision site clean, dry, intact

## 2024-01-26 NOTE — PLAN
[FreeTextEntry1] : Post Hospital discharge follow up - patient feeling well. Currently on keppra - repeat CBC today. Hemoglobin at discharge 10.2, WBC 15.13 - continue synthroid 175 mcg daily  - Neuro surg follow up

## 2024-01-26 NOTE — REVIEW OF SYSTEMS
[Fever] : no fever [Fatigue] : no fatigue [Chills] : no chills [Vision Problems] : no vision problems [Chest Pain] : no chest pain [Lower Ext Edema] : no lower extremity edema [Palpitations] : no palpitations [Shortness Of Breath] : no shortness of breath [Wheezing] : no wheezing [Cough] : no cough [Dyspnea on Exertion] : no dyspnea on exertion [Abdominal Pain] : no abdominal pain [Nausea] : no nausea [Diarrhea] : diarrhea [Vomiting] : no vomiting [Melena] : no melena [Frequency] : no frequency [Dysuria] : no dysuria [Muscle Weakness] : no muscle weakness [Muscle Pain] : no muscle pain [Skin Rash] : no skin rash [Headache] : no headache [Dizziness] : no dizziness [Fainting] : no fainting

## 2024-01-26 NOTE — HISTORY OF PRESENT ILLNESS
[de-identified] : 44 yo F pmh hypothyroidism and parathyroid disease s/p thyroidectomy and parathyroidectomy and radiation for thyroid cancer presents for hospital discharge follow up Accompanied by .  Had left craniotomy removal of meningioma by Dr Broussard Jan 12, 2024. Admitted to [facility] 1/12/24-1/16/24 Procedure went well. Had CT brain post op which was stable  Patient started on decadron and keppra post up --> decadron ocmpleted this week.  Had surveillance duplex was negative for DVT and post op MRI stable  She has followed up with neuro surg post op. Stapled removed 3 days prior.  On day of discharge from hospital, WBC 15.13, hemoglobin 10.2  Patients reports fine tremors since surgery however is improving.  Taking tylenol for pain control.     [FreeTextEntry1] : follow up

## 2024-01-29 LAB
HCT VFR BLD CALC: 33.4 %
HGB BLD-MCNC: 10.8 G/DL
MCHC RBC-ENTMCNC: 29.1 PG
MCHC RBC-ENTMCNC: 32.3 GM/DL
MCV RBC AUTO: 90 FL
PLATELET # BLD AUTO: 374 K/UL
RBC # BLD: 3.71 M/UL
RBC # FLD: 15.5 %
WBC # FLD AUTO: 12.38 K/UL

## 2024-01-31 ENCOUNTER — TRANSCRIPTION ENCOUNTER (OUTPATIENT)
Age: 44
End: 2024-01-31

## 2024-02-07 ENCOUNTER — APPOINTMENT (OUTPATIENT)
Dept: SPINE | Facility: CLINIC | Age: 44
End: 2024-02-07
Payer: COMMERCIAL

## 2024-02-07 VITALS
DIASTOLIC BLOOD PRESSURE: 80 MMHG | OXYGEN SATURATION: 98 % | HEIGHT: 63 IN | WEIGHT: 117 LBS | HEART RATE: 85 BPM | BODY MASS INDEX: 20.73 KG/M2 | SYSTOLIC BLOOD PRESSURE: 120 MMHG

## 2024-02-07 PROCEDURE — 99024 POSTOP FOLLOW-UP VISIT: CPT

## 2024-02-12 ENCOUNTER — RESULT REVIEW (OUTPATIENT)
Age: 44
End: 2024-02-12

## 2024-02-13 ENCOUNTER — APPOINTMENT (OUTPATIENT)
Dept: OTOLARYNGOLOGY | Facility: CLINIC | Age: 44
End: 2024-02-13

## 2024-02-14 ENCOUNTER — APPOINTMENT (OUTPATIENT)
Dept: CARDIOLOGY | Facility: CLINIC | Age: 44
End: 2024-02-14

## 2024-03-04 ENCOUNTER — APPOINTMENT (OUTPATIENT)
Dept: CARDIOLOGY | Facility: CLINIC | Age: 44
End: 2024-03-04
Payer: COMMERCIAL

## 2024-03-04 VITALS
OXYGEN SATURATION: 99 % | SYSTOLIC BLOOD PRESSURE: 122 MMHG | HEIGHT: 63 IN | DIASTOLIC BLOOD PRESSURE: 78 MMHG | WEIGHT: 116 LBS | BODY MASS INDEX: 20.55 KG/M2 | RESPIRATION RATE: 16 BRPM | TEMPERATURE: 98.2 F | HEART RATE: 77 BPM

## 2024-03-04 DIAGNOSIS — Z86.16 PERSONAL HISTORY OF COVID-19: ICD-10-CM

## 2024-03-04 DIAGNOSIS — R01.1 CARDIAC MURMUR, UNSPECIFIED: ICD-10-CM

## 2024-03-04 DIAGNOSIS — Z87.898 PERSONAL HISTORY OF OTHER SPECIFIED CONDITIONS: ICD-10-CM

## 2024-03-04 PROCEDURE — G2211 COMPLEX E/M VISIT ADD ON: CPT

## 2024-03-04 PROCEDURE — 99214 OFFICE O/P EST MOD 30 MIN: CPT

## 2024-03-04 NOTE — ASSESSMENT
[FreeTextEntry1] : This is a 43-year-old female here today for follow-up cardiac evaluation.  She has a past medical history significant for thyroid cancer S/P partial thyroidectomy 12 years ago, childhood asthma, status post COVID-19 infection June 2022 and received monoclonal antibody infusion, status post cholecystectomy, status post removal of fibroid.  She has no known cardiac risk factors.   HPI: She is feeling generally well today and denies chest pain, dizziness, heart palpitations, recent episodes of syncope or falls, SOB, or dyspnea at this time. She is here for routine blood pressure check and follow-up. She is recently recovering after undergoing scheduled Left Craniotomy Removal Meningioma with Dr. Broussard on 1/12/2024. States that she is back to working from home and doing more of her usual activities.   PMH: The patient had been complaining of headaches and vertigo-like symptoms for 1 year. She was visiting a friend in [place], went to a spa, had a massage and then went into the sauna (October 2023), she felt lightheaded, had increased heart rate, warm, tingling in her fingers and had transient loss of consciousness.  She did not seek medical attention at that time. She was seen by her primary care physician who ordered an MRI and was found to have a left-sided meningioma.   PMHx: 42-year-old female with history of thyroid cancer and COVID19 infection (June 2022) presents for cardiac consult. Patient has been experiencing episodes of shortness of breath and chest pain, especially worse when running. She has been an avid runner and has not been able to run the same since having COVID. She is still under care of [facility] for thyroid cancer which was diagnosed 12 years ago - she got surgery 12 years ago but they were unable to remove the full thyroid. She received monoclonal antibodies due to her cancer history.   BLOOD PRESSURE: -BP is well controlled in today's visit.   -I have discussed the importance of maintaining good BP control and reviewed the newest guidelines with the patient while re-enforcing dietary sodium restrictions to no more than 2-3 g daily, DASH diet, lifestyle modifications as well as the goal of maintaining ideal body weight with the patient today. I have advised the patient to avoid the use of over-the-counter medications/ supplements especially NSAIDS.   BLOOD WORK: -Lipid panel done November 2, 2023 demonstrated cholesterol 180, HDL 52, , non-, triglycerides 73, hemoglobin A1c of 5.5, and hemoglobin of 13.1 hematocrit of 40. -Lipid panel done February 2023 demonstrated triglyceride 88, cholesterol 187, HDL 59, , non-, LDL direct 106.   TESTING/REPORTS: -Echocardiogram done January 2024 demonstrated normal left function EF 66%, physiologic mitral regurgitation, mild tricuspid regurgitation.  -24-Hour Holter done December 2023 demonstrated underlying rhythm of normal sinus with heart rate ranging from  BPM and an average HR of 79 BPM.   -Electrocardiogram done December 11, 2023 demonstrated normal sinus rhythm rate 72 bpm is otherwise unremarkable.  -EKG done Feb 15, 2023 which demonstrated regular sinus rhythm with nonspecific ST-T wave changes, BPM of 69.  -Echocardiogram done December 29, 2022 demonstrated mild tricuspid regurgitation with normal left ventricular systolic function ejection fraction 66%.  -The patient had a normal exercise stress test on December 6, 2022   PLAN: -She will continue with her current medications and will contact the office if she is having any complaints between now and their next follow up appointment. -She will follow-up with her specialists routinely.    I have discussed the plan of care with DEREK OLIVIA and she will follow up in 4-6 months. They are compliant with all of their medications.     The patient understands that aerobic exercises must be increased to 40 minutes 4 times/week and a detailed discussion of lifestyle modification was done today.   The patient has a good understanding of the diagnosis, treatment plan and lifestyle modification.   They will contact me at the office for any questions with their care or any changes in their health status.   The patient was discussed with supervision physician Dr. Gavin Tellez at the time of the visit and the plan of care will be carried out as noted above.     YUDITH Rose NP

## 2024-03-04 NOTE — REASON FOR VISIT
[CV Risk Factors and Non-Cardiac Disease] : CV risk factors and non-cardiac disease [FreeTextEntry3] : Dr. Tanner [FreeTextEntry1] : This is a 43-year-old female with past medical history significant for thyroid cancer, status post partial thyroidectomy 12 years ago, childhood asthma, status post COVID-19 infection June 2022 and received monoclonal antibody infusion, status post cholecystectomy, status post removal of fibroid, who comes in for cardiac follow-up evaluation. The patient had been complaining of headaches, vertigo-like symptoms for close to a year.  She went into a sauna approximately a month ago, felt dizzy lightheaded increased heart rate and had transient loss of consciousness.  Patient had an MRI and was found to have a left-sided meningioma and will be having surgery January 12th, 2024.   PMHx:  Her past medical history is significant for thyroid cancer and COVID19 infection (June 2022).  Patient had been experiencing episodes of shortness of breath and chest pain, especially worse when running in 2022. She has been an avid runner and has not been able to run the same since having COVID. She is still under care of [facility] for thyroid cancer which was diagnosed 12 years ago - she got surgery 12 years ago but they were unable to remove the full thyroid. She received monoclonal antibodies due to her cancer history.

## 2024-03-04 NOTE — PHYSICAL EXAM
[Well Developed] : well developed [Well Nourished] : well nourished [No Acute Distress] : no acute distress [Normal Conjunctiva] : normal conjunctiva [No Carotid Bruit] : no carotid bruit [Normal Venous Pressure] : normal venous pressure [Normal S1, S2] : normal S1, S2 [No Rub] : no rub [No Gallop] : no gallop [5th Left ICS - MCL] : palpated at the 5th LICS in the midclavicular line [Normal] : normal [No Precordial Heave] : no precordial heave was noted [Normal Rate] : normal [Normal S1] : normal S1 [Normal S2] : normal S2 [I] : a grade 1 [No Pitting Edema] : no pitting edema present [2+] : right 2+ [No Abnormalities] : the abdominal aorta was not enlarged and no bruit was heard [Clear Lung Fields] : clear lung fields [Good Air Entry] : good air entry [No Respiratory Distress] : no respiratory distress  [Soft] : abdomen soft [Non Tender] : non-tender [No Masses/organomegaly] : no masses/organomegaly [Normal Bowel Sounds] : normal bowel sounds [Normal Gait] : normal gait [No Edema] : no edema [No Clubbing] : no clubbing [No Cyanosis] : no cyanosis [No Varicosities] : no varicosities [No Rash] : no rash [No Skin Lesions] : no skin lesions [Moves all extremities] : moves all extremities [No Focal Deficits] : no focal deficits [Normal Speech] : normal speech [Normal memory] : normal memory [Alert and Oriented] : alert and oriented [S3] : no S3 [Right Carotid Bruit] : no bruit heard over the right carotid [S4] : no S4 [Right Femoral Bruit] : no bruit heard over the right femoral artery [Left Carotid Bruit] : no bruit heard over the left carotid [Left Femoral Bruit] : no bruit heard over the left femoral artery

## 2024-03-04 NOTE — DISCUSSION/SUMMARY
[FreeTextEntry1] : Dr. Tellez-(PRIOR VISIT and PMH WITH Dr. Tellez): This is a 43-year-old female with past medical history significant for thyroid cancer, status post partial thyroidectomy 12 years ago, childhood asthma, status post COVID-19 infection June 2022 and received monoclonal antibody infusion, status post cholecystectomy, status post removal of fibroid, who comes in for cardiac follow-up evaluation. She denies palpitations, dizziness, or chest pressure.  She has no history of rheumatic fever.  She does not drink excessive caffeine or alcohol. She has no known cardiac risk factors. The patient had been complaining of headaches and vertigo-like symptoms for 1 year. She was visiting a friend in [place], went to a spa, had a massage and then went into the sauna (October 2023), she felt lightheaded, had increased heart rate, warm, tingling in her fingers and had transient loss of consciousness.  She did not seek medical attention at that time.  She was seen by her primary care physician who ordered an MRI and was found to have a left-sided meningioma. She also had 1 other episode of increased heart rate after having a half a glass of wine. Electrocardiogram done December 11, 2023 demonstrated normal sinus rhythm rate 72 bpm is otherwise unremarkable. The patient will have a Holter monitor done today to rule out significant arrhythmia or heart block.  If there is no significant findings, I recommend the patient have a 2-week event monitor. Echo Doppler examination done December 29, 2022 demonstrated physiologic mitral valve regurgitation, mild tricuspid valve regurgitation, thinning of the interatrial septum with normal ejection fraction of 66%. Lipid panel done November 2, 2023 demonstrated cholesterol 180, HDL 52, , non-, triglycerides 73, hemoglobin A1c of 5.5, and hemoglobin of 13.1 hematocrit of 40. The patient had normal exercise stress test December 6, 2022.  She was able to talk through the entire test comfortably. The patient is scheduled for neurosurgical procedure January 2024 for removal of her meningioma. I have also recommended she make an appoint with a neurologist. She will continue to monitor her heart rate with her watch.  She is also encouraged to increase her hydration. She is currently hemodynamically stable and asymptomatic from cardiac standpoint.  She has no orthostatic changes on clinical examination. Blood work done October 17, 2022 demonstrated a cholesterol of 205, HDL of 56, triglycerides of 75, LDL direct of 136, non-HDL cholesterol 150 mg/dL. Lifestyle modification was reinforced.   Electrocardiogram done October 17, 2022 demonstrated normal sinus rhythm rate of 82 bpm and is otherwise unremarkable The patient is encouraged to maintain good hydration. The patient understands that aerobic exercises must be increased to 40 minutes 4 times per week. A detailed discussion of lifestyle modification was done today. The patient has a good understanding of the diagnosis, and treatment plan. Lifestyle modification was also outlined. Thank you for allowing to participate in care of your patient.  Please do not hesitate to call if you have any further questions.  ADDENDUM: January 2, 2024::  This patient is cleared from a cardiac standpoint for surgery.  She has normal left ventricular function with estimated ejection fraction of 66%. Please avoid overhydration.  Maintain prophylaxis for venous thrombosis.  Patient should have an incentive spirometer in the perioperative period.

## 2024-03-08 ENCOUNTER — APPOINTMENT (OUTPATIENT)
Dept: PLASTIC SURGERY | Facility: CLINIC | Age: 44
End: 2024-03-08
Payer: COMMERCIAL

## 2024-03-08 ENCOUNTER — APPOINTMENT (OUTPATIENT)
Dept: NEUROSURGERY | Facility: CLINIC | Age: 44
End: 2024-03-08
Payer: COMMERCIAL

## 2024-03-08 VITALS
DIASTOLIC BLOOD PRESSURE: 87 MMHG | WEIGHT: 116 LBS | SYSTOLIC BLOOD PRESSURE: 135 MMHG | HEIGHT: 63 IN | BODY MASS INDEX: 20.55 KG/M2 | OXYGEN SATURATION: 98 % | HEART RATE: 78 BPM

## 2024-03-08 VITALS — WEIGHT: 116 LBS | BODY MASS INDEX: 20.55 KG/M2 | HEIGHT: 63 IN

## 2024-03-08 DIAGNOSIS — R23.4 CHANGES IN SKIN TEXTURE: ICD-10-CM

## 2024-03-08 PROCEDURE — 99024 POSTOP FOLLOW-UP VISIT: CPT

## 2024-03-08 RX ORDER — MUPIROCIN 20 MG/G
2 OINTMENT TOPICAL
Qty: 1 | Refills: 0 | Status: ACTIVE | COMMUNITY
Start: 2024-03-08 | End: 1900-01-01

## 2024-03-08 NOTE — HISTORY OF PRESENT ILLNESS
[FreeTextEntry1] :  Pt is a 43 year female with scalp wound S/P Left frontoparietal temporal craniotomy DOS 1/12/24. Pt reported yellow drainage from scalp. Denies fever, chills.

## 2024-03-08 NOTE — PHYSICAL EXAM
[de-identified] : Scalp with eschar x2, healthy granulating tissue underneath, minimal bloody drainage from wound, dressed with bacitracin, no surrounding erythema, no sign of gross infection

## 2024-03-09 PROBLEM — R23.4: Status: ACTIVE | Noted: 2024-03-09

## 2024-03-09 NOTE — ASSESSMENT
[FreeTextEntry1] : Kaelyn Wsetbrook is a 43-year-old woman presenting with S/P Left frontotemporal parietal meningioma resection on 1/12/24 now with Delayed wound healing and eschar tissue formation at the top end of cranial site and smaller eschar tissue at the lateral mid portion of the incision.    Urgent referral to see Dr. Lebron Moraes for cranial wound management. Follow up with Dr. Broussard in 2-3 weeks after establishing care with a plastic surgeon.  Education was provided regarding the plan of care.

## 2024-03-09 NOTE — REASON FOR VISIT
[Spouse] : spouse [de-identified] : S/P Left frontoparietal temporal craniotomy.               Removal of tumor, Lemon grade 1 resection.               Use of stereotactic guidance. [de-identified] : 1/12/24 [de-identified] : Kaelyn Westbrook is a 43 year old woman presenting with S/P Left frontotemporal parietal meningioma resection on 1/12/24 now  with Delayed wound healing and eschar tissue formation at top end of cranial  site and smaller eschar tissue at the lateral mid portion of the incision.  She denies fever and chills and neurological deficits. She ambulates without difficulty. She reports intermittent pressure headaches and moderate intermittent drainage. She is concerned about infection.

## 2024-03-09 NOTE — PHYSICAL EXAM
[General Appearance - Alert] : alert [General Appearance - In No Acute Distress] : in no acute distress [Transverse] : transverse [Purulent] : exhibiting purulent drainage [Upper Portion] : upper portion [Mid-Portion] : mid-portion [Medial Portion] : medial portion [___ Drainage] : exhibiting [unfilled] drainage [Oriented To Time, Place, And Person] : oriented to person, place, and time [Impaired Insight] : insight and judgment were intact [Cranial Nerves Optic (II)] : visual acuity intact bilaterally,  pupils equal round and reactive to light [Cranial Nerves Oculomotor (III)] : extraocular motion intact [Cranial Nerves Trigeminal (V)] : facial sensation intact symmetrically [Cranial Nerves Facial (VII)] : face symmetrical [Cranial Nerves Vestibulocochlear (VIII)] : hearing was intact bilaterally [Cranial Nerves Glossopharyngeal (IX)] : tongue and palate midline [Cranial Nerves Accessory (XI - Cranial And Spinal)] : head turning and shoulder shrug symmetric [Cranial Nerves Hypoglossal (XII)] : there was no tongue deviation with protrusion [Motor Tone] : muscle tone was normal in all four extremities [Motor Strength] : muscle strength was normal in all four extremities [Sensation Tactile Decrease] : light touch was intact [Balance] : balance was intact [Sclera] : the sclera and conjunctiva were normal [Outer Ear] : the ears and nose were normal in appearance [Neck Appearance] : the appearance of the neck was normal [] : no respiratory distress [Heart Rate And Rhythm] : heart rate was normal and rhythm regular [Abnormal Walk] : normal gait [FreeTextEntry1] : largest eschar tissue at the top portion

## 2024-03-09 NOTE — REVIEW OF SYSTEMS
[Tension Headache] : tension-type headaches [Arthralgias] : arthralgias [Negative] : Heme/Lymph [Fever] : no fever [Chills] : no chills

## 2024-03-15 ENCOUNTER — APPOINTMENT (OUTPATIENT)
Dept: PLASTIC SURGERY | Facility: CLINIC | Age: 44
End: 2024-03-15
Payer: COMMERCIAL

## 2024-03-15 VITALS
WEIGHT: 116 LBS | HEART RATE: 78 BPM | BODY MASS INDEX: 20.55 KG/M2 | HEIGHT: 63 IN | OXYGEN SATURATION: 98 % | SYSTOLIC BLOOD PRESSURE: 130 MMHG | TEMPERATURE: 98.4 F | DIASTOLIC BLOOD PRESSURE: 86 MMHG

## 2024-03-15 PROCEDURE — 99024 POSTOP FOLLOW-UP VISIT: CPT

## 2024-03-15 NOTE — HISTORY OF PRESENT ILLNESS
[FreeTextEntry1] : Pt is a 43 year female with scalp wound S/P Left frontoparietal temporal craniotomy DOS 1/12/24. Pt accompanied by family member today. Reported drainage decreased with mupirocin ointment.  Pt doing well. No complaints at this time. Denies fever, chills, pain, rash.

## 2024-03-15 NOTE — PHYSICAL EXAM
[de-identified] : small dry eschar along left lateral scalp, eschar removed wound healing well, no drainage/bleeding noted, no surrounding erythema, no sign of gross infection

## 2024-03-22 ENCOUNTER — APPOINTMENT (OUTPATIENT)
Dept: PULMONOLOGY | Facility: CLINIC | Age: 44
End: 2024-03-22
Payer: COMMERCIAL

## 2024-03-22 VITALS
OXYGEN SATURATION: 98 % | RESPIRATION RATE: 16 BRPM | WEIGHT: 117 LBS | BODY MASS INDEX: 20.73 KG/M2 | DIASTOLIC BLOOD PRESSURE: 76 MMHG | HEART RATE: 84 BPM | TEMPERATURE: 97 F | SYSTOLIC BLOOD PRESSURE: 120 MMHG | HEIGHT: 63 IN

## 2024-03-22 DIAGNOSIS — J38.01 PARALYSIS OF VOCAL CORDS AND LARYNX, UNILATERAL: ICD-10-CM

## 2024-03-22 DIAGNOSIS — R06.02 SHORTNESS OF BREATH: ICD-10-CM

## 2024-03-22 DIAGNOSIS — U09.9 POST COVID-19 CONDITION, UNSPECIFIED: ICD-10-CM

## 2024-03-22 DIAGNOSIS — J45.909 UNSPECIFIED ASTHMA, UNCOMPLICATED: ICD-10-CM

## 2024-03-22 DIAGNOSIS — J30.9 ALLERGIC RHINITIS, UNSPECIFIED: ICD-10-CM

## 2024-03-22 DIAGNOSIS — K21.9 GASTRO-ESOPHAGEAL REFLUX DISEASE W/OUT ESOPHAGITIS: ICD-10-CM

## 2024-03-22 PROCEDURE — 95012 NITRIC OXIDE EXP GAS DETER: CPT

## 2024-03-22 PROCEDURE — 99214 OFFICE O/P EST MOD 30 MIN: CPT | Mod: 25

## 2024-03-22 PROCEDURE — 94010 BREATHING CAPACITY TEST: CPT

## 2024-03-22 NOTE — PROCEDURE
[FreeTextEntry1] : PFTs revealed normal flows; FEV1 was 2.62L, which is 92.9% of predicted; normal flow volume loop. PFTs were performed to evaluate for SOB, asthma  FENO was 15; a normal value being less than 25 Fractional exhaled nitric oxide (FENO) is regarded as a simple, noninvasive method for assessing eosinophilic airway inflammation. Produced by a variety of cells within the lung, nitric oxide (NO) concentrations are generally low in healthy individuals. However, high concentrations of NO appear to be involved in nonspecific host defense mechanisms and chronic inflammatory diseases such as asthma. The American Thoracic Society (ATS) therefore has recommended using FENO to aid in the diagnosis and monitoring of eosinophilic airway inflammation and asthma, and for identifying steroid responsive individuals whose chronic respiratory symptoms may be caused by airway inflammation.

## 2024-03-22 NOTE — PHYSICAL EXAM
[No Acute Distress] : no acute distress [Normal Oropharynx] : normal oropharynx [Normal Appearance] : normal appearance [No Neck Mass] : no neck mass [Normal Rate/Rhythm] : normal rate/rhythm [Normal S1, S2] : normal s1, s2 [No Murmurs] : no murmurs [No Resp Distress] : no resp distress [Clear to Auscultation Bilaterally] : clear to auscultation bilaterally [No Abnormalities] : no abnormalities [Normal Gait] : normal gait [Benign] : benign [No Clubbing] : no clubbing [No Cyanosis] : no cyanosis [FROM] : FROM [No Edema] : no edema [Normal Color/ Pigmentation] : normal color/ pigmentation [No Focal Deficits] : no focal deficits [Oriented x3] : oriented x3 [Normal Affect] : normal affect [II] : Mallampati Class: II [TextBox_68] : I:E ratio 1:3; clear

## 2024-03-22 NOTE — HISTORY OF PRESENT ILLNESS
[TextBox_4] : Ms. BAKER is a 43 year old female with a history of fibroid surgery, IBS, hypothyroid, malabsorption, thyroid surgery with parathyroid injury (2010), covid-19 infection, benign breast tumor, post-covid sob, presenting to the office today for a follow-up pulmonary evaluation. Her chief complaint is  -she notes s/p surgery for meningioma (Dr. Broussard). She has an MRI pending in 1 week. She initially had persistent headaches, heightened sensitivity to sound, tinnitus, and trouble sleeping. She still has sensitivity to sound while her other Sx have resolved. -she notes GERD is controlled on Pantoprazole and Pepcid -she notes bowels are regular  -she notes her senses of smell and taste are stable  -she notes vision is stable  -she notes diet is good  -she notes appetite is stable  -she notes her breathing is stable on the nasal spray and inhaler -she notes she feels nasal congestion if she's NC with her nasal spray -she notes regular menstruation  -she denies any nausea, emesis, fever, chills, sweats, chest pain, chest pressure, coughing, wheezing, palpitations, diarrhea, constipation, dysphagia, vertigo, arthralgias, myalgias, leg swelling, itchy eyes, itchy ears, or sour taste in the mouth.

## 2024-03-22 NOTE — ASSESSMENT
[FreeTextEntry1] : Ms. 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 Handy Moncada) - vocal cord injections 6/2023  60% successful  -recommended speech therapy post-injection  Problem 2: Post- Covid reactive airways/ asthma  -Breztri 2 BID  -Recommend Aerochamber  Asthma is  believed to be caused by inherited (genetic) and environmental factor, but its exact cause is unknown. Asthma may be triggered by allergens, lung infections, or irritants in the air. Asthma triggers are different for each person  Inhaler technique reviewed as well as oral hygiene techniques reviewed with patient. Avoidance of cold air, extremes of temperature, rescue inhaler should be used before exercise. Order of medication reviewed with patient. Recommended use of a cool mist humidifier in the bedroom.   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   Problem 5:  ?LPR  -Protonix 40 mg qAM before breakfast  -Pepcid 40mg QHS  -Rule of 2s: avoid eating too much, eating too late, eating too spicy, eating two hours before bed. -Things to avoid including overeating, spicy foods, tight clothing, eating within three hours of bed, this list is not all inclusive.  -For treatment of reflux, possible options discussed including diet control, H2 blockers, PPIs, as well as coating motility agents discussed as treatment options. Timing of meals and proximity of last meal to sleep were discussed. If symptoms persist, a formal gastrointestinal evaluation is needed.   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  Cardiovascular disease risk includes arrhythmias, atrial fibrillation, hypertension, coronary artery disease, and stroke. Metabolic disorders include diabetes type 2, non-alcoholic fatty liver disease. Mood disorder especially depression; and cognitive decline especially in the elderly. Associations with  chronic reflux/Farmer's esophagus some but not all inclusive.  -Reasons  include arousal consistent with hypopnea; respiratory events most prominent in REM sleep or supine position; therefore sleep staging and body position are important for accurate diagnosis and estimation of AHI.   problem 8: cardiac disease -s/p CRP  -s/p Sed rate  - ECHO- WNL -recommended to continue to follow up with Cardiologist (Dr. Gavin Tellez)   problem 9: poor breathing mechanics -Proper breathing techniques were reviewed with an emphasis of exhalation. Patient instructed to breath in for 1 second and out for four seconds. Patient was encouraged to not talk while walking.   problem 10: out of shape  -Exercise, and diet control were discussed and are highly encouraged. Treatment options were given such as, aqua therapy, and contacting a nutritionist. Recommended to use the elliptical, stationary bike, less use of treadmill. Mindful eating was explained to the patient Obesity is associated with worsening asthma, shortness of breath, and potential for cardiac disease, diabetes, and other underlying medical conditions  problem 11: health maintenance  -meningioma s/p excision 1/2024 ([facility]) -Recommended ENT (Asia Dobbins)  -recommended yearly flu shot after October 15 (s/p 2023) -recommended strep pneumonia vaccines: Prevnar-13 vaccine, followed by Pneumo vaccine 23 one year following after 65 -recommended early intervention for Upper Respiratory Infections (URIs) -recommended regular osteoporosis evaluations -recommended early dermatological evaluations -recommended after the age of 50 to the age of 70, colonoscopy every 5 years  F/P in 3-4 months She is encouraged to call with any changes, concerns, or questions

## 2024-03-28 ENCOUNTER — APPOINTMENT (OUTPATIENT)
Dept: MRI IMAGING | Facility: CLINIC | Age: 44
End: 2024-03-28
Payer: COMMERCIAL

## 2024-03-28 ENCOUNTER — OUTPATIENT (OUTPATIENT)
Dept: OUTPATIENT SERVICES | Facility: HOSPITAL | Age: 44
LOS: 1 days | End: 2024-03-28
Payer: COMMERCIAL

## 2024-03-28 DIAGNOSIS — D32.9 BENIGN NEOPLASM OF MENINGES, UNSPECIFIED: ICD-10-CM

## 2024-03-28 DIAGNOSIS — Z98.890 OTHER SPECIFIED POSTPROCEDURAL STATES: Chronic | ICD-10-CM

## 2024-03-28 PROCEDURE — 70553 MRI BRAIN STEM W/O & W/DYE: CPT | Mod: 26

## 2024-03-28 PROCEDURE — 70553 MRI BRAIN STEM W/O & W/DYE: CPT

## 2024-03-28 PROCEDURE — A9585: CPT

## 2024-03-29 ENCOUNTER — APPOINTMENT (OUTPATIENT)
Dept: PLASTIC SURGERY | Facility: CLINIC | Age: 44
End: 2024-03-29
Payer: COMMERCIAL

## 2024-03-29 VITALS
BODY MASS INDEX: 20.73 KG/M2 | HEART RATE: 76 BPM | WEIGHT: 117 LBS | SYSTOLIC BLOOD PRESSURE: 127 MMHG | OXYGEN SATURATION: 99 % | TEMPERATURE: 98 F | DIASTOLIC BLOOD PRESSURE: 82 MMHG | HEIGHT: 63 IN

## 2024-03-29 DIAGNOSIS — T14.8XXA OTHER INJURY OF UNSPECIFIED BODY REGION, INITIAL ENCOUNTER: ICD-10-CM

## 2024-03-29 PROCEDURE — 99213 OFFICE O/P EST LOW 20 MIN: CPT

## 2024-03-29 NOTE — PHYSICAL EXAM
[de-identified] : incision clean healed no drainage/bleeding noted, no surrounding erythema, no sign of gross infection

## 2024-04-23 ENCOUNTER — NON-APPOINTMENT (OUTPATIENT)
Age: 44
End: 2024-04-23

## 2024-04-23 RX ORDER — FAMOTIDINE 40 MG/1
40 TABLET, FILM COATED ORAL
Qty: 90 | Refills: 1 | Status: ACTIVE | COMMUNITY
Start: 2022-07-28 | End: 1900-01-01

## 2024-04-24 ENCOUNTER — APPOINTMENT (OUTPATIENT)
Dept: SPINE | Facility: CLINIC | Age: 44
End: 2024-04-24
Payer: COMMERCIAL

## 2024-04-24 VITALS
HEIGHT: 63 IN | SYSTOLIC BLOOD PRESSURE: 125 MMHG | DIASTOLIC BLOOD PRESSURE: 84 MMHG | OXYGEN SATURATION: 98 % | BODY MASS INDEX: 20.73 KG/M2 | HEART RATE: 73 BPM | WEIGHT: 117 LBS

## 2024-04-24 DIAGNOSIS — D32.9 BENIGN NEOPLASM OF MENINGES, UNSPECIFIED: ICD-10-CM

## 2024-04-24 PROCEDURE — 99213 OFFICE O/P EST LOW 20 MIN: CPT

## 2024-04-25 NOTE — PHYSICAL EXAM
[General Appearance - Alert] : alert [General Appearance - In No Acute Distress] : in no acute distress [Clean] : clean [Dry] : dry [Healing Well] : healing well [Intact] : intact [No Drainage] : without drainage [Normal Skin] : normal [Erythema] : not erythematous [Tender] : not tender [FreeTextEntry1] : left crainal  [Oriented To Time, Place, And Person] : oriented to person, place, and time [Impaired Insight] : insight and judgment were intact [Mood] : the mood was normal [Person] : oriented to person [Place] : oriented to place [Time] : oriented to time [Short Term Intact] : short term memory intact [Remote Intact] : remote memory intact [Registration Intact] : recent registration memory intact [Span Intact] : the attention span was normal [Concentration Intact] : normal concentrating ability [Visual Intact] : visual attention was ~T not ~L decreased [Fluency] : fluency intact [Comprehension] : comprehension intact [Reading] : reading intact [Current Events] : adequate knowledge of current events [Past History] : adequate knowledge of personal past history [Vocabulary] : adequate range of vocabulary [Cranial Nerves Trigeminal (V)] : facial sensation intact symmetrically [Cranial Nerves Facial (VII)] : face symmetrical [Cranial Nerves Vestibulocochlear (VIII)] : hearing was intact bilaterally [Cranial Nerves Glossopharyngeal (IX)] : tongue and palate midline [Cranial Nerves Accessory (XI - Cranial And Spinal)] : head turning and shoulder shrug symmetric [Cranial Nerves Hypoglossal (XII)] : there was no tongue deviation with protrusion [Motor Tone] : muscle tone was normal in all four extremities [Motor Strength] : muscle strength was normal in all four extremities [Sensation Tactile Decrease] : light touch was intact [Abnormal Walk] : normal gait [Balance] : balance was intact [Limited Balance] : balance was intact [Tremor] : no tremor present

## 2024-04-25 NOTE — DATA REVIEWED
[de-identified] : Brain w/wo contrast w/wo from [facility] on 3/28/2024

## 2024-04-25 NOTE — ASSESSMENT
[FreeTextEntry1] : 44 year old female s/p left frontoparietal temporal craniotomy for removal of the meningioma which as a Lemon grade 1 resection on 01/12/2014. MRI of brain w/wo contrast shows no residual or recurrence of mass. The patient will avoid any strenuous activities. She will follow up in September 2024 with MRI of brain w/wo contrast.

## 2024-04-25 NOTE — HISTORY OF PRESENT ILLNESS
[FreeTextEntry1] : DEREK BAKER is a 44-year-old lady who had an episode of dizziness with some nausea and a near syncopal episode recently. This led to an MRI that showed a left frontolateral convexity meningioma with mass effect and edema. She underwent a left frontoparietal temporal craniotomy for removal of the meningioma which as a Lemon grade 1 resection on 01/12/2014. The pathology is an atypical meningioma, WHO grade 2.  The patient was seen in the office on 3/8/2024 for evaluation of the wound. There was delayed wound healing and eschar tissue formation at top end of cranial site and smaller eschar tissue at the lateral mid portion of the incision. She denied fever and chills and neurological deficits. She reported intermittent pressure headaches and moderate intermittent drainage. She was referred to plastic surgeon Dr. Lebron Moraes.   Today she reports she is doing well. She has been doing light exercising. Incision is healing well; without drainage She is here to review MRI of the brain w/wo contrast.

## 2024-05-22 RX ORDER — OLOPATADINE HYDROCHLORIDE 665 UG/1
0.6 SPRAY, METERED NASAL
Qty: 3 | Refills: 1 | Status: ACTIVE | COMMUNITY
Start: 2022-07-28 | End: 1900-01-01

## 2024-05-28 ENCOUNTER — NON-APPOINTMENT (OUTPATIENT)
Age: 44
End: 2024-05-28

## 2024-06-18 RX ORDER — PANTOPRAZOLE 40 MG/1
40 TABLET, DELAYED RELEASE ORAL
Qty: 90 | Refills: 0 | Status: ACTIVE | COMMUNITY
Start: 2023-01-06 | End: 1900-01-01

## 2024-06-29 ENCOUNTER — RX RENEWAL (OUTPATIENT)
Age: 44
End: 2024-06-29

## 2024-07-01 ENCOUNTER — RX RENEWAL (OUTPATIENT)
Age: 44
End: 2024-07-01

## 2024-07-06 ENCOUNTER — RX RENEWAL (OUTPATIENT)
Age: 44
End: 2024-07-06

## 2024-08-02 DIAGNOSIS — R29.898 OTHER SYMPTOMS AND SIGNS INVOLVING THE MUSCULOSKELETAL SYSTEM: ICD-10-CM

## 2024-08-23 ENCOUNTER — APPOINTMENT (OUTPATIENT)
Dept: PULMONOLOGY | Facility: CLINIC | Age: 44
End: 2024-08-23
Payer: COMMERCIAL

## 2024-08-23 VITALS
WEIGHT: 117 LBS | DIASTOLIC BLOOD PRESSURE: 72 MMHG | OXYGEN SATURATION: 99 % | TEMPERATURE: 97.6 F | HEART RATE: 75 BPM | RESPIRATION RATE: 16 BRPM | BODY MASS INDEX: 20.73 KG/M2 | HEIGHT: 63 IN | SYSTOLIC BLOOD PRESSURE: 110 MMHG

## 2024-08-23 DIAGNOSIS — J38.01 PARALYSIS OF VOCAL CORDS AND LARYNX, UNILATERAL: ICD-10-CM

## 2024-08-23 DIAGNOSIS — J30.9 ALLERGIC RHINITIS, UNSPECIFIED: ICD-10-CM

## 2024-08-23 DIAGNOSIS — R06.02 SHORTNESS OF BREATH: ICD-10-CM

## 2024-08-23 DIAGNOSIS — J45.909 UNSPECIFIED ASTHMA, UNCOMPLICATED: ICD-10-CM

## 2024-08-23 DIAGNOSIS — K21.9 GASTRO-ESOPHAGEAL REFLUX DISEASE W/OUT ESOPHAGITIS: ICD-10-CM

## 2024-08-23 DIAGNOSIS — Z72.820 SLEEP DEPRIVATION: ICD-10-CM

## 2024-08-23 PROCEDURE — ZZZZZ: CPT

## 2024-08-23 PROCEDURE — 94010 BREATHING CAPACITY TEST: CPT

## 2024-08-23 PROCEDURE — 94727 GAS DIL/WSHOT DETER LNG VOL: CPT

## 2024-08-23 PROCEDURE — 90715 TDAP VACCINE 7 YRS/> IM: CPT

## 2024-08-23 PROCEDURE — 95012 NITRIC OXIDE EXP GAS DETER: CPT

## 2024-08-23 PROCEDURE — 90471 IMMUNIZATION ADMIN: CPT

## 2024-08-23 PROCEDURE — 99214 OFFICE O/P EST MOD 30 MIN: CPT | Mod: 25

## 2024-08-23 PROCEDURE — 94729 DIFFUSING CAPACITY: CPT

## 2024-08-23 NOTE — ASSESSMENT
[FreeTextEntry1] : Ms. 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 Asthma may be triggered by allergens, lung infections, or irritants in the air. Asthma triggers are different for each person  Inhaler technique reviewed as well as oral hygiene techniques reviewed with patient. Avoidance of cold air, extremes of temperature, rescue inhaler should be used before exercise. Order of medication reviewed with patient. Recommended use of a cool mist humidifier in the bedroom.   Problem 3: Diaphragm dysfunction (phrenic nerve EMG as result of surgery or covid)  -s/p US of diaphragm WNL -s/p Fluoroscopy WNL -Full PFTs and RAYNA q6 months -recommended the Breather -recommended to use GoFormz Medic Plus IMT (30 reps, 2X per day)   Problem 4: Allergies -s/p Blood work to include: + asthma panel, - food IgE panel,- IgE level, - eosinophil level, - vitamin D level  -Olopatadine 0.6% 1 sniff BID  Environmental measures for allergies were encouraged including mattress and pillow covers, air purifier, and environmental controls.   Problem 5:  ?LPR  -Protonix 40 mg qAM before breakfast  -Pepcid 40mg QHS  -Rule of 2s: avoid eating too much, eating too late, eating too spicy, eating two hours before bed. -Things to avoid including overeating, spicy foods, tight clothing, eating within three hours of bed, this list is not all inclusive.  -For treatment of reflux, possible options discussed including diet control, H2 blockers, PPIs, as well as coating motility agents discussed as treatment options. Timing of meals and proximity of last meal to sleep were discussed. If symptoms persist, a formal gastrointestinal evaluation is needed.   Problem 6: Long Covid-19  -Recommended Johnathan Katja book on 10 day Detox  -Recommended Low Histamine Diet   Problem 7: Poor sleep/ fatigue/ leg cramps- Gage Calm PM -s/p home sleep study- WNL  Sleep apnea is associated with adverse clinical consequences which can affect most organ systems.  Cardiovascular disease risk includes arrhythmias, atrial fibrillation, hypertension, coronary artery disease, and stroke. Metabolic disorders include diabetes type 2, non-alcoholic fatty liver disease. Mood disorder especially depression; and cognitive decline especially in the elderly. Associations with  chronic reflux/Farmer's esophagus some but not all inclusive.  -Reasons  include arousal consistent with hypopnea; respiratory events most prominent in REM sleep or supine position; therefore sleep staging and body position are important for accurate diagnosis and estimation of AHI.   problem 8: cardiac disease -s/p CRP  -s/p Sed rate  - ECHO- WNL -recommended to continue to follow up with Cardiologist (Dr. Gavin Tellez)   problem 9: poor breathing mechanics -Proper breathing techniques were reviewed with an emphasis of exhalation. Patient instructed to breath in for 1 second and out for four seconds. Patient was encouraged to not talk while walking.   problem 10: out of shape  -Exercise, and diet control were discussed and are highly encouraged. Treatment options were given such as, aqua therapy, and contacting a nutritionist. Recommended to use the elliptical, stationary bike, less use of treadmill. Mindful eating was explained to the patient Obesity is associated with worsening asthma, shortness of breath, and potential for cardiac disease, diabetes, and other underlying medical conditions  problem 11: health maintenance  -TDaP vaccine given in office 08/23/2024  -meningioma s/p excision 1/2024 ([facility]) -Recommended ENT (Asia Dobbins)  -recommended yearly flu shot after October 15 (s/p 2023) -recommended strep pneumonia vaccines: Prevnar-13 vaccine, followed by Pneumo vaccine 23 one year following after 65 -recommended early intervention for Upper Respiratory Infections (URIs) -recommended regular osteoporosis evaluations -recommended early dermatological evaluations -recommended after the age of 50 to the age of 70, colonoscopy every 5 years  F/P in 6 months She is encouraged to call with any changes, concerns, or questions

## 2024-08-23 NOTE — REASON FOR VISIT
[Follow-Up] : a follow-up visit [TextBox_44] : vocal cord dysfunction/ paralysis, metastatic thyroid, SOB, LPR, allergies, asthma s/p COVID-19

## 2024-08-23 NOTE — ADDENDUM
[FreeTextEntry1] : Documented by Lori Ramesh acting as a scribe for Dr. Dago Cooper on 08/23/2024. All medical record entries made by the Scribe were at my, Dr. Dago Cooper's, direction and personally dictated by me on 08/23/2024. I have reviewed the chart and agree that the record accurately reflects my personal performance of the history, physical exam, assessment and plan. I have also personally directed, reviewed, and agree with the discharge instructions.

## 2024-08-23 NOTE — PROCEDURE
[FreeTextEntry1] : Full PFT reveals normal flows; FEV1 was 2.70L which is 103% of predicted; normal lung volumes; normal diffusion at 19.8, which is 104% of predicted; normal flow volume loop. PFTs were performed to evaluate for asthma  FENO was 13; a normal value being less than 25 Fractional exhaled nitric oxide (FENO) is regarded as a simple, noninvasive method for assessing eosinophilic airway inflammation. Produced by a variety of cells within the lung, nitric oxide (NO) concentrations are generally low in healthy individuals. However, high concentrations of NO appear to be involved in nonspecific host defense mechanisms and chronic inflammatory diseases such as asthma. The American Thoracic Society (ATS) therefore has recommended using FENO to aid in the diagnosis and monitoring of eosinophilic airway inflammation and asthma, and for identifying steroid responsive individuals whose chronic respiratory symptoms may be caused by airway inflammation.

## 2024-08-23 NOTE — PHYSICAL EXAM
[No Acute Distress] : no acute distress [Normal Oropharynx] : normal oropharynx [Normal Appearance] : normal appearance [No Neck Mass] : no neck mass [Normal Rate/Rhythm] : normal rate/rhythm [Normal S1, S2] : normal s1, s2 [No Murmurs] : no murmurs [No Resp Distress] : no resp distress [Clear to Auscultation Bilaterally] : clear to auscultation bilaterally [No Abnormalities] : no abnormalities [Benign] : benign [Normal Gait] : normal gait [No Clubbing] : no clubbing [No Cyanosis] : no cyanosis [No Edema] : no edema [FROM] : FROM [Normal Color/ Pigmentation] : normal color/ pigmentation [No Focal Deficits] : no focal deficits [Oriented x3] : oriented x3 [Normal Affect] : normal affect [III] : Mallampati Class: III [TextBox_2] : thin [TextBox_68] : I:E ratio 1:3; clear  [TextBox_105] : L wrist brace

## 2024-08-23 NOTE — HISTORY OF PRESENT ILLNESS
[TextBox_4] : Ms. BAKER is a 44 year old female with a history of fibroid surgery, IBS, hypothyroid, malabsorption, thyroid surgery with parathyroid injury (2010), covid-19 infection, benign breast tumor, post-COVID-19 SOB presenting to the office today for a follow-up pulmonary evaluation. Her chief complaint is  -she notes feeling generally well  -she notes she started PT for her L hand, which her doctor believes is related to her tumor. She's been in P for 3 weeks. She has weakness/issues opening a jar and holding things -she notes sleeping for 6 hours, but she doesn't feel well-rested. -she notes weight is stable. She'd like to be more fit. She exercises on the Peloton every other day. She was doing karate prior, and she felt stronger in her core. She'd like to get more upper body and core exercises. -she notes neck stiffness, ?due to the way she sleeps -she denies SOB at night as long as she doesn't lie flat -she notes she doesn't breathe as well when she eats crunchy foods -she notes she wakes up once per night for no reason. She doesn't have nocturnal muscle cramps anymore on MyoCalm -she denies any vocal cord issues, though she has issues projecting in loud environments  -she denies any headaches, nausea, emesis, fever, chills, sweats, chest pain, chest pressure, coughing, wheezing, palpitations, diarrhea, constipation, dysphagia, vertigo, leg swelling, itchy eyes, itchy ears, heartburn, reflux, or sour taste in the mouth.

## 2024-08-25 ENCOUNTER — NON-APPOINTMENT (OUTPATIENT)
Age: 44
End: 2024-08-25

## 2024-08-26 DIAGNOSIS — D24.9 BENIGN NEOPLASM OF UNSPECIFIED BREAST: ICD-10-CM

## 2024-08-26 NOTE — HISTORY OF PRESENT ILLNESS
[de-identified] : Ms. DEREK BAKER is a 44 year old female who returns for follow up.  Past medical history: thyroid cancer at age 30, Asthma  Family history: breast cancer in maternal grandmother at age 70.   B/L mammo/sono 7/2021: Right breast 8:00 4cm from the nipple is a well-circumscribed benign-appearing hypoechoic nodule measuring 15 x 5 x 10mm. This finding appears increased in size possibly relating to difference in technique. Recommended for 6  months right breast US. BIRADS 3.   She had a follow up right breast ultrasound on 1/4/22.  There is no sonographic correlate for the area of nonmass enhancement noted on prior MRI.  MRI biopsy was previously advised.  Circumscribed mass at the 8:00 position of the right breast has increased in size, ultrasound-guided biopsy is recommended (BIRADS 4a).   She is s/p ultrasound-guided right breast biopsy on 1/18/22.  Pathology demonstrated a myxoid fibroadenoma, benign and concordant.  MRI-guided right breast biopsy was also performed with benign findings.   Bilateral mammogram/sonogram 7/2022- No evidence of malignancy BI-RADS 1  Most recent screening MMG/US on 11/8/2024 (TC 34.2%) no evidence of malignancy. BI-RADS 2    Today she is without any complaints. Denies palpable breast masses, nipple discharge, skin changes, inversion or breast pain. Denies constitutional symptoms.

## 2024-08-26 NOTE — REASON FOR VISIT
[Follow-Up Visit] : a follow-up visit for [Breast Mass] : breast mass [FreeTextEntry2] : Fibroadenoma

## 2024-08-26 NOTE — ASSESSMENT
[FreeTextEntry1] : IMP: 44 year old female initially seen regarding right breast mass. She is s/p USGB of the right breast mass as well as an MRI-guided right breast biopsy 1/2022 with benign findings  PLAN:  Return in one year MMG/US Nov 2024-ordered   All medical entries were at my, Dr. Justin Robles, direction. I have reviewed the chart and agree that the record accurately reflects my personal performance of the history, physical exam, assessment and plan. Our office Nurse Practitioner was present of the duration of the office visit.

## 2024-08-26 NOTE — PHYSICAL EXAM
[Normal] : supple, no neck mass and thyroid not enlarged [Normal Neck Lymph Nodes] : normal neck lymph nodes  [Normal Supraclavicular Lymph Nodes] : normal supraclavicular lymph nodes [Normal Axillary Lymph Nodes] : normal axillary lymph nodes [Normal] : oriented to person, place and time, with appropriate affect [FreeTextEntry1] : SC was present during the exam  [de-identified] : Normal S1, S2. regular rate and rhythm.  [de-identified] : Complete breast exam performed in supine and upright positions. No palpable masses, tenderness, nipple discharge, inversion, deviation or enlarged axillary or supraclavicular lymph nodes bilaterally.  [de-identified] : Clear breath sounds bilaterally, normal respiratory effort.

## 2024-09-18 ENCOUNTER — OUTPATIENT (OUTPATIENT)
Dept: OUTPATIENT SERVICES | Facility: HOSPITAL | Age: 44
LOS: 1 days | End: 2024-09-18
Payer: COMMERCIAL

## 2024-09-18 ENCOUNTER — APPOINTMENT (OUTPATIENT)
Dept: MRI IMAGING | Facility: CLINIC | Age: 44
End: 2024-09-18

## 2024-09-18 ENCOUNTER — APPOINTMENT (OUTPATIENT)
Dept: MRI IMAGING | Facility: CLINIC | Age: 44
End: 2024-09-18
Payer: COMMERCIAL

## 2024-09-18 DIAGNOSIS — Z98.890 OTHER SPECIFIED POSTPROCEDURAL STATES: Chronic | ICD-10-CM

## 2024-09-18 DIAGNOSIS — D32.9 BENIGN NEOPLASM OF MENINGES, UNSPECIFIED: ICD-10-CM

## 2024-09-18 PROCEDURE — A9585: CPT

## 2024-09-18 PROCEDURE — 70553 MRI BRAIN STEM W/O & W/DYE: CPT | Mod: 26

## 2024-09-18 PROCEDURE — 72141 MRI NECK SPINE W/O DYE: CPT

## 2024-09-18 PROCEDURE — 72141 MRI NECK SPINE W/O DYE: CPT | Mod: 26

## 2024-09-18 PROCEDURE — 70553 MRI BRAIN STEM W/O & W/DYE: CPT

## 2024-09-30 ENCOUNTER — LABORATORY RESULT (OUTPATIENT)
Age: 44
End: 2024-09-30

## 2024-09-30 ENCOUNTER — APPOINTMENT (OUTPATIENT)
Dept: CARDIOLOGY | Facility: CLINIC | Age: 44
End: 2024-09-30
Payer: COMMERCIAL

## 2024-09-30 ENCOUNTER — NON-APPOINTMENT (OUTPATIENT)
Age: 44
End: 2024-09-30

## 2024-09-30 VITALS
SYSTOLIC BLOOD PRESSURE: 110 MMHG | BODY MASS INDEX: 20.55 KG/M2 | DIASTOLIC BLOOD PRESSURE: 70 MMHG | RESPIRATION RATE: 16 BRPM | OXYGEN SATURATION: 98 % | HEIGHT: 63 IN | WEIGHT: 116 LBS | TEMPERATURE: 98.2 F | HEART RATE: 75 BPM

## 2024-09-30 DIAGNOSIS — I07.1 RHEUMATIC TRICUSPID INSUFFICIENCY: ICD-10-CM

## 2024-09-30 DIAGNOSIS — Z86.79 PERSONAL HISTORY OF OTHER DISEASES OF THE CIRCULATORY SYSTEM: ICD-10-CM

## 2024-09-30 DIAGNOSIS — R00.2 PALPITATIONS: ICD-10-CM

## 2024-09-30 PROCEDURE — G2211 COMPLEX E/M VISIT ADD ON: CPT

## 2024-09-30 PROCEDURE — 93000 ELECTROCARDIOGRAM COMPLETE: CPT

## 2024-09-30 PROCEDURE — 99214 OFFICE O/P EST MOD 30 MIN: CPT

## 2024-09-30 NOTE — DISCUSSION/SUMMARY
[FreeTextEntry1] : Dr. Tellez-(PRIOR VISIT and PMH WITH Dr. Tellez): This is a 43-year-old female with past medical history significant for thyroid cancer, status post partial thyroidectomy 12 years ago, childhood asthma, status post COVID-19 infection June 2022 and received monoclonal antibody infusion, status post cholecystectomy, status post removal of fibroid, who comes in for cardiac follow-up evaluation. She denies palpitations, dizziness, or chest pressure.  She has no history of rheumatic fever.  She does not drink excessive caffeine or alcohol. She has no known cardiac risk factors. The patient had been complaining of headaches and vertigo-like symptoms for 1 year. She was visiting a friend in [place], went to a spa, had a massage and then went into the sauna (October 2023), she felt lightheaded, had increased heart rate, warm, tingling in her fingers and had transient loss of consciousness.  She did not seek medical attention at that time.  She was seen by her primary care physician who ordered an MRI and was found to have a left-sided meningioma. She also had 1 other episode of increased heart rate after having a half a glass of wine. Electrocardiogram done December 11, 2023 demonstrated normal sinus rhythm rate 72 bpm is otherwise unremarkable. The patient will have a Holter monitor done today to rule out significant arrhythmia or heart block.  If there is no significant findings, I recommend the patient have a 2-week event monitor. Echo Doppler examination done December 29, 2022 demonstrated physiologic mitral valve regurgitation, mild tricuspid valve regurgitation, thinning of the interatrial septum with normal ejection fraction of 66%. Lipid panel done November 2, 2023 demonstrated cholesterol 180, HDL 52, , non-, triglycerides 73, hemoglobin A1c of 5.5, and hemoglobin of 13.1 hematocrit of 40. The patient had normal exercise stress test December 6, 2022.  She was able to talk through the entire test comfortably. The patient is scheduled for neurosurgical procedure January 2024 for removal of her meningioma. I have also recommended she make an appoint with a neurologist. She will continue to monitor her heart rate with her watch.  She is also encouraged to increase her hydration. She is currently hemodynamically stable and asymptomatic from cardiac standpoint.  She has no orthostatic changes on clinical examination. Blood work done October 17, 2022 demonstrated a cholesterol of 205, HDL of 56, triglycerides of 75, LDL direct of 136, non-HDL cholesterol 150 mg/dL. Lifestyle modification was reinforced.   Electrocardiogram done October 17, 2022 demonstrated normal sinus rhythm rate of 82 bpm and is otherwise unremarkable The patient is encouraged to maintain good hydration. The patient understands that aerobic exercises must be increased to 40 minutes 4 times per week. A detailed discussion of lifestyle modification was done today. The patient has a good understanding of the diagnosis, and treatment plan. Lifestyle modification was also outlined. Thank you for allowing to participate in care of your patient.  Please do not hesitate to call if you have any further questions.  ADDENDUM: January 2, 2024::  This patient is cleared from a cardiac standpoint for surgery.  She has normal left ventricular function with estimated ejection fraction of 66%. Please avoid overhydration.  Maintain prophylaxis for venous thrombosis.  Patient should have an incentive spirometer in the perioperative period.

## 2024-09-30 NOTE — ASSESSMENT
[FreeTextEntry1] : This is a 44-year-old female here today for follow-up cardiac evaluation.  She has a past medical history significant for thyroid cancer S/P partial thyroidectomy 12 years ago, childhood asthma, status post COVID-19 infection June 2022 and received monoclonal antibody infusion, status post cholecystectomy, status post removal of fibroid, s/p Left Craniotomy Removal Meningioma with Dr. Broussard on 1/12/2024.   She has no known cardiac risk factors.   HPI: She is feeling generally well today and denies chest pain, dizziness, recent episodes of syncope or falls, SOB, or dyspnea at this time. She is here for routine blood pressure check and follow-up. Reports that she does experience palpitations every now and then that can last for longer than a few minutes at a time- she denies dizziness or lightheadedness during these times. States that she develops a headache sometimes with them and has to try and go to sleep to feel better.   Current Medications: None cardiac.    BLOOD PRESSURE: -BP is well controlled in today's visit.  -I have discussed the importance of maintaining good BP control and reviewed the newest guidelines with the patient while re-enforcing dietary sodium restrictions to no more than 2-3 g daily, DASH diet, lifestyle modifications as well as the goal of maintaining ideal body weight with the patient today. I have advised the patient to avoid the use of over-the-counter medications/ supplements especially NSAIDS.   BLOOD WORK: -New blood work was done 09/30/2024 to evaluate lipid profile, CBC, BMP, hepatic function, A1C and TSH. -Lipid panel done November 2, 2023 demonstrated cholesterol 180, HDL 52, , non-, triglycerides 73, hemoglobin A1c of 5.5, and hemoglobin of 13.1 hematocrit of 40. -Lipid panel done February 2023 demonstrated triglyceride 88, cholesterol 187, HDL 59, , non-, LDL direct 106.   TESTING/REPORTS: -EKG done 09/30/2024 demonstrated regular sinus rhythm rate 75 bpm otherwise unremarkable.   -Echocardiogram done January 2024 demonstrated normal left function EF 66%, physiologic mitral regurgitation, mild tricuspid regurgitation.  -24-Hour Holter done December 2023 demonstrated underlying rhythm of normal sinus with heart rate ranging from  BPM and an average HR of 79 BPM.   -Electrocardiogram done December 11, 2023 demonstrated normal sinus rhythm rate 72 bpm is otherwise unremarkable.  -EKG done Feb 15, 2023 which demonstrated regular sinus rhythm with nonspecific ST-T wave changes, BPM of 69.  -Echocardiogram done December 29, 2022 demonstrated mild tricuspid regurgitation with normal left ventricular systolic function ejection fraction 66%.  -The patient had a normal exercise stress test on December 6, 2022   PLAN: -She will continue with her current medications and will contact the office if she is having any complaints between now and their next follow up appointment. -She will follow-up with her specialists routinely.    I have discussed the plan of care with DEREK BAKER and she will follow up in 6 months. They are compliant with all of their medications.     The patient understands that aerobic exercises must be increased to 40 minutes 4 times/week and a detailed discussion of lifestyle modification was done today.   The patient has a good understanding of the diagnosis, treatment plan and lifestyle modification.   They will contact me at the office for any questions with their care or any changes in their health status.   The patient was discussed with supervision physician Dr. Gavin Tellez at the time of the visit and the plan of care will be carried out as noted above.     YUDITH Rose NP

## 2024-09-30 NOTE — REASON FOR VISIT
[CV Risk Factors and Non-Cardiac Disease] : CV risk factors and non-cardiac disease [FreeTextEntry3] : Dr. Tanner [FreeTextEntry1] : This is a 43-year-old female with past medical history significant for thyroid cancer, status post partial thyroidectomy 12 years ago, childhood asthma, status post COVID-19 infection June 2022 and received monoclonal antibody infusion, status post cholecystectomy, status post removal of fibroid, who comes in for cardiac follow-up evaluation. The patient had been complaining of headaches, vertigo-like symptoms for close to a year.  She went into a sauna approximately a month ago, felt dizzy lightheaded increased heart rate and had transient loss of consciousness.  Patient had an MRI and was found to have a left-sided meningioma and will be having surgery January 12th, 2024.   PMHx:  Her past medical history is significant for thyroid cancer and COVID19 infection (June 2022).  Patient had been experiencing episodes of shortness of breath and chest pain, especially worse when running in 2022. She has been an avid runner and has not been able to run the same since having COVID. She is still under care of [facility] for thyroid cancer which was diagnosed 12 years ago - she got surgery 12 years ago but they were unable to remove the full thyroid. She received monoclonal antibodies due to her cancer history.

## 2024-10-01 ENCOUNTER — TRANSCRIPTION ENCOUNTER (OUTPATIENT)
Age: 44
End: 2024-10-01

## 2024-10-02 ENCOUNTER — APPOINTMENT (OUTPATIENT)
Dept: SPINE | Facility: CLINIC | Age: 44
End: 2024-10-02
Payer: COMMERCIAL

## 2024-10-02 VITALS
HEART RATE: 81 BPM | OXYGEN SATURATION: 99 % | RESPIRATION RATE: 17 BRPM | SYSTOLIC BLOOD PRESSURE: 125 MMHG | DIASTOLIC BLOOD PRESSURE: 82 MMHG | BODY MASS INDEX: 20.55 KG/M2 | WEIGHT: 116 LBS | HEIGHT: 63 IN

## 2024-10-02 DIAGNOSIS — M54.12 RADICULOPATHY, CERVICAL REGION: ICD-10-CM

## 2024-10-02 DIAGNOSIS — D32.9 BENIGN NEOPLASM OF MENINGES, UNSPECIFIED: ICD-10-CM

## 2024-10-02 PROCEDURE — 99213 OFFICE O/P EST LOW 20 MIN: CPT

## 2024-10-08 ENCOUNTER — APPOINTMENT (OUTPATIENT)
Dept: SURGICAL ONCOLOGY | Facility: CLINIC | Age: 44
End: 2024-10-08
Payer: COMMERCIAL

## 2024-10-08 ENCOUNTER — NON-APPOINTMENT (OUTPATIENT)
Age: 44
End: 2024-10-08

## 2024-10-08 VITALS
HEIGHT: 63 IN | BODY MASS INDEX: 20.55 KG/M2 | SYSTOLIC BLOOD PRESSURE: 143 MMHG | HEART RATE: 73 BPM | RESPIRATION RATE: 17 BRPM | DIASTOLIC BLOOD PRESSURE: 89 MMHG | WEIGHT: 116 LBS | OXYGEN SATURATION: 97 %

## 2024-10-08 PROCEDURE — 99213 OFFICE O/P EST LOW 20 MIN: CPT

## 2024-11-08 ENCOUNTER — RESULT REVIEW (OUTPATIENT)
Age: 44
End: 2024-11-08

## 2024-11-08 ENCOUNTER — APPOINTMENT (OUTPATIENT)
Dept: MAMMOGRAPHY | Facility: CLINIC | Age: 44
End: 2024-11-08

## 2024-11-08 ENCOUNTER — APPOINTMENT (OUTPATIENT)
Dept: ULTRASOUND IMAGING | Facility: CLINIC | Age: 44
End: 2024-11-08

## 2024-11-08 PROCEDURE — 76641 ULTRASOUND BREAST COMPLETE: CPT | Mod: 50

## 2024-11-08 PROCEDURE — 77067 SCR MAMMO BI INCL CAD: CPT

## 2024-11-08 PROCEDURE — 77063 BREAST TOMOSYNTHESIS BI: CPT

## 2024-11-19 ENCOUNTER — APPOINTMENT (OUTPATIENT)
Dept: ULTRASOUND IMAGING | Facility: CLINIC | Age: 44
End: 2024-11-19
Payer: COMMERCIAL

## 2024-11-19 PROCEDURE — 76642 ULTRASOUND BREAST LIMITED: CPT | Mod: LT

## 2024-11-22 DIAGNOSIS — N60.02 SOLITARY CYST OF LEFT BREAST: ICD-10-CM

## 2024-12-19 ENCOUNTER — RX RENEWAL (OUTPATIENT)
Age: 44
End: 2024-12-19

## 2025-01-23 ENCOUNTER — APPOINTMENT (OUTPATIENT)
Dept: ORTHOPEDIC SURGERY | Facility: CLINIC | Age: 45
End: 2025-01-23
Payer: COMMERCIAL

## 2025-01-23 VITALS — HEIGHT: 63 IN | BODY MASS INDEX: 20.55 KG/M2 | WEIGHT: 116 LBS

## 2025-01-23 DIAGNOSIS — M18.12 UNILATERAL PRIMARY OSTEOARTHRITIS OF FIRST CARPOMETACARPAL JOINT, LEFT HAND: ICD-10-CM

## 2025-01-23 PROCEDURE — 73140 X-RAY EXAM OF FINGER(S): CPT | Mod: LT

## 2025-01-23 PROCEDURE — 99204 OFFICE O/P NEW MOD 45 MIN: CPT

## 2025-02-06 ENCOUNTER — NON-APPOINTMENT (OUTPATIENT)
Age: 45
End: 2025-02-06

## 2025-02-26 ENCOUNTER — APPOINTMENT (OUTPATIENT)
Dept: PULMONOLOGY | Facility: CLINIC | Age: 45
End: 2025-02-26
Payer: COMMERCIAL

## 2025-02-26 ENCOUNTER — APPOINTMENT (OUTPATIENT)
Dept: CARDIOLOGY | Facility: CLINIC | Age: 45
End: 2025-02-26
Payer: COMMERCIAL

## 2025-02-26 VITALS
RESPIRATION RATE: 17 BRPM | TEMPERATURE: 98.4 F | BODY MASS INDEX: 20.91 KG/M2 | SYSTOLIC BLOOD PRESSURE: 144 MMHG | WEIGHT: 118 LBS | HEART RATE: 78 BPM | DIASTOLIC BLOOD PRESSURE: 90 MMHG | OXYGEN SATURATION: 98 % | HEIGHT: 63 IN

## 2025-02-26 DIAGNOSIS — R00.2 PALPITATIONS: ICD-10-CM

## 2025-02-26 DIAGNOSIS — J30.9 ALLERGIC RHINITIS, UNSPECIFIED: ICD-10-CM

## 2025-02-26 DIAGNOSIS — K21.9 GASTRO-ESOPHAGEAL REFLUX DISEASE W/OUT ESOPHAGITIS: ICD-10-CM

## 2025-02-26 DIAGNOSIS — J38.01 PARALYSIS OF VOCAL CORDS AND LARYNX, UNILATERAL: ICD-10-CM

## 2025-02-26 DIAGNOSIS — J45.909 UNSPECIFIED ASTHMA, UNCOMPLICATED: ICD-10-CM

## 2025-02-26 DIAGNOSIS — R06.02 SHORTNESS OF BREATH: ICD-10-CM

## 2025-02-26 PROCEDURE — 94010 BREATHING CAPACITY TEST: CPT

## 2025-02-26 PROCEDURE — 99214 OFFICE O/P EST MOD 30 MIN: CPT | Mod: 25

## 2025-02-26 PROCEDURE — 95012 NITRIC OXIDE EXP GAS DETER: CPT

## 2025-02-26 PROCEDURE — 93246 EXT ECG>7D<15D RECORDING: CPT

## 2025-03-06 ENCOUNTER — RX RENEWAL (OUTPATIENT)
Age: 45
End: 2025-03-06

## 2025-03-19 ENCOUNTER — OUTPATIENT (OUTPATIENT)
Dept: OUTPATIENT SERVICES | Facility: HOSPITAL | Age: 45
LOS: 1 days | End: 2025-03-19
Payer: COMMERCIAL

## 2025-03-19 ENCOUNTER — APPOINTMENT (OUTPATIENT)
Dept: MRI IMAGING | Facility: CLINIC | Age: 45
End: 2025-03-19
Payer: COMMERCIAL

## 2025-03-19 DIAGNOSIS — Z98.890 OTHER SPECIFIED POSTPROCEDURAL STATES: Chronic | ICD-10-CM

## 2025-03-19 DIAGNOSIS — D32.9 BENIGN NEOPLASM OF MENINGES, UNSPECIFIED: ICD-10-CM

## 2025-03-19 DIAGNOSIS — Z00.8 ENCOUNTER FOR OTHER GENERAL EXAMINATION: ICD-10-CM

## 2025-03-19 PROCEDURE — A9585: CPT

## 2025-03-19 PROCEDURE — 70553 MRI BRAIN STEM W/O & W/DYE: CPT | Mod: 26

## 2025-03-19 PROCEDURE — 99157 MOD SED OTHER PHYS/QHP EA: CPT

## 2025-03-19 PROCEDURE — 99155 MOD SED OTH PHYS/QHP <5 YRS: CPT

## 2025-03-19 PROCEDURE — 70553 MRI BRAIN STEM W/O & W/DYE: CPT

## 2025-03-26 ENCOUNTER — NON-APPOINTMENT (OUTPATIENT)
Age: 45
End: 2025-03-26

## 2025-03-26 ENCOUNTER — APPOINTMENT (OUTPATIENT)
Dept: SPINE | Facility: CLINIC | Age: 45
End: 2025-03-26
Payer: COMMERCIAL

## 2025-03-26 VITALS
HEIGHT: 63 IN | DIASTOLIC BLOOD PRESSURE: 71 MMHG | BODY MASS INDEX: 20.91 KG/M2 | OXYGEN SATURATION: 98 % | WEIGHT: 118 LBS | SYSTOLIC BLOOD PRESSURE: 120 MMHG | HEART RATE: 68 BPM | RESPIRATION RATE: 16 BRPM

## 2025-03-26 DIAGNOSIS — R42 DIZZINESS AND GIDDINESS: ICD-10-CM

## 2025-03-26 DIAGNOSIS — Z87.898 PERSONAL HISTORY OF OTHER SPECIFIED CONDITIONS: ICD-10-CM

## 2025-03-26 DIAGNOSIS — D32.9 BENIGN NEOPLASM OF MENINGES, UNSPECIFIED: ICD-10-CM

## 2025-03-26 PROCEDURE — 99213 OFFICE O/P EST LOW 20 MIN: CPT

## 2025-04-23 ENCOUNTER — APPOINTMENT (OUTPATIENT)
Dept: OTOLARYNGOLOGY | Facility: CLINIC | Age: 45
End: 2025-04-23
Payer: COMMERCIAL

## 2025-04-23 VITALS
BODY MASS INDEX: 20.91 KG/M2 | WEIGHT: 118 LBS | DIASTOLIC BLOOD PRESSURE: 83 MMHG | SYSTOLIC BLOOD PRESSURE: 126 MMHG | HEIGHT: 63 IN

## 2025-04-23 DIAGNOSIS — J30.9 ALLERGIC RHINITIS, UNSPECIFIED: ICD-10-CM

## 2025-04-23 DIAGNOSIS — G43.809 OTHER MIGRAINE, NOT INTRACTABLE, W/OUT STATUS MIGRAINOSUS: ICD-10-CM

## 2025-04-23 DIAGNOSIS — H93.293 OTHER ABNORMAL AUDITORY PERCEPTIONS, BILATERAL: ICD-10-CM

## 2025-04-23 DIAGNOSIS — R42 DIZZINESS AND GIDDINESS: ICD-10-CM

## 2025-04-23 PROCEDURE — 92567 TYMPANOMETRY: CPT

## 2025-04-23 PROCEDURE — 92557 COMPREHENSIVE HEARING TEST: CPT

## 2025-04-23 PROCEDURE — 99244 OFF/OP CNSLTJ NEW/EST MOD 40: CPT | Mod: 25

## 2025-04-23 PROCEDURE — 92504 EAR MICROSCOPY EXAMINATION: CPT

## 2025-04-23 RX ORDER — MAGNESIUM OXIDE/MAG AA CHELATE 300 MG
CAPSULE ORAL
Refills: 0 | Status: ACTIVE | COMMUNITY

## 2025-04-25 ENCOUNTER — APPOINTMENT (OUTPATIENT)
Dept: CARDIOLOGY | Facility: CLINIC | Age: 45
End: 2025-04-25

## 2025-04-25 PROCEDURE — 93248 EXT ECG>7D<15D REV&INTERPJ: CPT

## 2025-05-02 VITALS
WEIGHT: 175 LBS | BODY MASS INDEX: 31.01 KG/M2 | TEMPERATURE: 97.8 F | HEART RATE: 58 BPM | RESPIRATION RATE: 16 BRPM | OXYGEN SATURATION: 98 % | HEIGHT: 63 IN

## 2025-05-06 ENCOUNTER — RX RENEWAL (OUTPATIENT)
Age: 45
End: 2025-05-06

## 2025-05-07 ENCOUNTER — APPOINTMENT (OUTPATIENT)
Dept: OTOLARYNGOLOGY | Facility: CLINIC | Age: 45
End: 2025-05-07

## 2025-05-07 PROCEDURE — 92517 VEMP TEST I&R CERVICAL: CPT

## 2025-05-07 PROCEDURE — 92540 BASIC VESTIBULAR EVALUATION: CPT

## 2025-05-07 PROCEDURE — 92567 TYMPANOMETRY: CPT

## 2025-05-07 PROCEDURE — 92537 CALORIC VSTBLR TEST W/REC: CPT

## 2025-05-07 PROCEDURE — 92547 SUPPLEMENTAL ELECTRICAL TEST: CPT

## 2025-05-27 ENCOUNTER — NON-APPOINTMENT (OUTPATIENT)
Age: 45
End: 2025-05-27

## 2025-05-30 ENCOUNTER — APPOINTMENT (OUTPATIENT)
Dept: NEUROLOGY | Facility: CLINIC | Age: 45
End: 2025-05-30
Payer: COMMERCIAL

## 2025-05-30 VITALS
HEIGHT: 63 IN | SYSTOLIC BLOOD PRESSURE: 124 MMHG | DIASTOLIC BLOOD PRESSURE: 77 MMHG | WEIGHT: 119 LBS | HEART RATE: 74 BPM | BODY MASS INDEX: 21.09 KG/M2

## 2025-05-30 DIAGNOSIS — G40.209 LOCALIZATION-RELATED (FOCAL) (PARTIAL) SYMPTOMATIC EPILEPSY AND EPILEPTIC SYNDROMES WITH COMPLEX PARTIAL SEIZURES, NOT INTRACTABLE, W/OUT STATUS EPILEPTICUS: ICD-10-CM

## 2025-05-30 DIAGNOSIS — G04.81 OTHER ENCEPHALITIS AND ENCEPHALOMYELITIS: ICD-10-CM

## 2025-05-30 DIAGNOSIS — E56.9 VITAMIN DEFICIENCY, UNSPECIFIED: ICD-10-CM

## 2025-05-30 DIAGNOSIS — G90.09 OTHER IDIOPATHIC PERIPHERAL AUTONOMIC NEUROPATHY: ICD-10-CM

## 2025-05-30 PROCEDURE — 99245 OFF/OP CONSLTJ NEW/EST HI 55: CPT

## 2025-05-30 RX ORDER — VERAPAMIL HYDROCHLORIDE 120 MG/1
120 TABLET ORAL
Qty: 30 | Refills: 3 | Status: ACTIVE | COMMUNITY
Start: 2025-05-30 | End: 1900-01-01

## 2025-05-30 RX ORDER — NORTRIPTYLINE HYDROCHLORIDE 10 MG/1
10 CAPSULE ORAL
Qty: 60 | Refills: 3 | Status: ACTIVE | COMMUNITY
Start: 2025-05-30 | End: 1900-01-01

## 2025-06-03 ENCOUNTER — APPOINTMENT (OUTPATIENT)
Dept: NEUROLOGY | Facility: CLINIC | Age: 45
End: 2025-06-03
Payer: COMMERCIAL

## 2025-06-03 PROCEDURE — 95816 EEG AWAKE AND DROWSY: CPT

## 2025-06-06 LAB
ANA SER IF-ACNC: NEGATIVE
ANACR T: NEGATIVE
CRP SERPL-MCNC: 8 MG/L
ENA SCL70 IGG SER IA-ACNC: <0.2 AL
ENA SS-A AB SER IA-ACNC: <0.2 AL
ENA SS-B AB SER IA-ACNC: <0.2 AL
ERYTHROCYTE [SEDIMENTATION RATE] IN BLOOD BY WESTERGREN METHOD: 8 MM/HR
FOLATE SERPL-MCNC: 12.4 NG/ML
HOMOCYSTEINE LEVEL: 11.8 UMOL/L
METHYLMALONATE SERPL-SCNC: 173 NMOL/L
VIT B12 SERPL-MCNC: 351 PG/ML

## 2025-06-09 ENCOUNTER — NON-APPOINTMENT (OUTPATIENT)
Age: 45
End: 2025-06-09

## 2025-06-09 ENCOUNTER — APPOINTMENT (OUTPATIENT)
Dept: NEUROLOGY | Facility: CLINIC | Age: 45
End: 2025-06-09
Payer: COMMERCIAL

## 2025-06-09 VITALS
WEIGHT: 119 LBS | HEART RATE: 82 BPM | OXYGEN SATURATION: 98 % | TEMPERATURE: 98 F | HEIGHT: 63 IN | DIASTOLIC BLOOD PRESSURE: 87 MMHG | BODY MASS INDEX: 21.09 KG/M2 | SYSTOLIC BLOOD PRESSURE: 134 MMHG

## 2025-06-09 PROCEDURE — 95921 AUTONOMIC NRV PARASYM INERVJ: CPT

## 2025-06-09 PROCEDURE — 93922 UPR/L XTREMITY ART 2 LEVELS: CPT

## 2025-06-09 PROCEDURE — 95923 AUTONOMIC NRV SYST FUNJ TEST: CPT

## 2025-07-07 ENCOUNTER — TRANSCRIPTION ENCOUNTER (OUTPATIENT)
Age: 45
End: 2025-07-07

## 2025-08-07 ENCOUNTER — APPOINTMENT (OUTPATIENT)
Dept: SLEEP CENTER | Facility: CLINIC | Age: 45
End: 2025-08-07

## 2025-08-07 ENCOUNTER — OUTPATIENT (OUTPATIENT)
Dept: OUTPATIENT SERVICES | Facility: HOSPITAL | Age: 45
LOS: 1 days | End: 2025-08-07
Payer: COMMERCIAL

## 2025-08-07 DIAGNOSIS — Z98.890 OTHER SPECIFIED POSTPROCEDURAL STATES: Chronic | ICD-10-CM

## 2025-08-07 PROCEDURE — 95810 POLYSOM 6/> YRS 4/> PARAM: CPT

## 2025-08-07 PROCEDURE — 95810 POLYSOM 6/> YRS 4/> PARAM: CPT | Mod: 26

## 2025-08-12 ENCOUNTER — APPOINTMENT (OUTPATIENT)
Dept: PULMONOLOGY | Facility: CLINIC | Age: 45
End: 2025-08-12
Payer: COMMERCIAL

## 2025-08-12 VITALS
TEMPERATURE: 97.2 F | HEIGHT: 63 IN | SYSTOLIC BLOOD PRESSURE: 112 MMHG | DIASTOLIC BLOOD PRESSURE: 80 MMHG | RESPIRATION RATE: 17 BRPM | OXYGEN SATURATION: 99 % | BODY MASS INDEX: 20.55 KG/M2 | HEART RATE: 72 BPM | WEIGHT: 116 LBS

## 2025-08-12 DIAGNOSIS — Z72.820 SLEEP DEPRIVATION: ICD-10-CM

## 2025-08-12 DIAGNOSIS — R06.02 SHORTNESS OF BREATH: ICD-10-CM

## 2025-08-12 DIAGNOSIS — J45.909 UNSPECIFIED ASTHMA, UNCOMPLICATED: ICD-10-CM

## 2025-08-12 DIAGNOSIS — J38.00 PARALYSIS OF VOCAL CORDS AND LARYNX, UNSPECIFIED: ICD-10-CM

## 2025-08-12 DIAGNOSIS — K21.9 GASTRO-ESOPHAGEAL REFLUX DISEASE W/OUT ESOPHAGITIS: ICD-10-CM

## 2025-08-12 DIAGNOSIS — J30.9 ALLERGIC RHINITIS, UNSPECIFIED: ICD-10-CM

## 2025-08-12 PROCEDURE — 94729 DIFFUSING CAPACITY: CPT

## 2025-08-12 PROCEDURE — 95012 NITRIC OXIDE EXP GAS DETER: CPT

## 2025-08-12 PROCEDURE — ZZZZZ: CPT

## 2025-08-12 PROCEDURE — 94010 BREATHING CAPACITY TEST: CPT

## 2025-08-12 PROCEDURE — 94727 GAS DIL/WSHOT DETER LNG VOL: CPT

## 2025-08-12 PROCEDURE — 99214 OFFICE O/P EST MOD 30 MIN: CPT | Mod: 25

## 2025-08-15 ENCOUNTER — APPOINTMENT (OUTPATIENT)
Dept: NEUROLOGY | Facility: CLINIC | Age: 45
End: 2025-08-15
Payer: COMMERCIAL

## 2025-08-15 VITALS
WEIGHT: 116 LBS | HEART RATE: 76 BPM | HEIGHT: 63 IN | SYSTOLIC BLOOD PRESSURE: 125 MMHG | DIASTOLIC BLOOD PRESSURE: 79 MMHG | BODY MASS INDEX: 20.55 KG/M2

## 2025-08-15 PROCEDURE — 99214 OFFICE O/P EST MOD 30 MIN: CPT

## 2025-08-19 ENCOUNTER — APPOINTMENT (OUTPATIENT)
Dept: ULTRASOUND IMAGING | Facility: CLINIC | Age: 45
End: 2025-08-19
Payer: COMMERCIAL

## 2025-08-19 ENCOUNTER — RESULT REVIEW (OUTPATIENT)
Age: 45
End: 2025-08-19

## 2025-08-19 PROCEDURE — 76642 ULTRASOUND BREAST LIMITED: CPT | Mod: LT

## 2025-08-22 ENCOUNTER — RX RENEWAL (OUTPATIENT)
Age: 45
End: 2025-08-22

## 2025-08-28 ENCOUNTER — LABORATORY RESULT (OUTPATIENT)
Age: 45
End: 2025-08-28

## 2025-08-28 ENCOUNTER — APPOINTMENT (OUTPATIENT)
Dept: CARDIOLOGY | Facility: CLINIC | Age: 45
End: 2025-08-28
Payer: COMMERCIAL

## 2025-08-28 ENCOUNTER — RX RENEWAL (OUTPATIENT)
Age: 45
End: 2025-08-28

## 2025-08-28 VITALS
HEIGHT: 63 IN | WEIGHT: 116 LBS | BODY MASS INDEX: 20.55 KG/M2 | RESPIRATION RATE: 16 BRPM | OXYGEN SATURATION: 99 % | HEART RATE: 82 BPM | TEMPERATURE: 97.3 F

## 2025-08-28 VITALS — DIASTOLIC BLOOD PRESSURE: 76 MMHG | SYSTOLIC BLOOD PRESSURE: 118 MMHG

## 2025-08-28 DIAGNOSIS — I07.1 RHEUMATIC TRICUSPID INSUFFICIENCY: ICD-10-CM

## 2025-08-28 DIAGNOSIS — R06.09 OTHER FORMS OF DYSPNEA: ICD-10-CM

## 2025-08-28 DIAGNOSIS — R00.2 PALPITATIONS: ICD-10-CM

## 2025-08-28 PROCEDURE — 99213 OFFICE O/P EST LOW 20 MIN: CPT | Mod: 25

## 2025-08-28 PROCEDURE — 93015 CV STRESS TEST SUPVJ I&R: CPT

## 2025-09-11 ENCOUNTER — TRANSCRIPTION ENCOUNTER (OUTPATIENT)
Age: 45
End: 2025-09-11

## 2025-09-11 DIAGNOSIS — G47.33 OBSTRUCTIVE SLEEP APNEA (ADULT) (PEDIATRIC): ICD-10-CM

## 2025-09-12 ENCOUNTER — TRANSCRIPTION ENCOUNTER (OUTPATIENT)
Age: 45
End: 2025-09-12

## (undated) DEVICE — GLV 8.5 PROTEXIS (WHITE)

## (undated) DEVICE — MIDAS REX LEGEND LUBRICANT DIFFUSER CARTRIDGE

## (undated) DEVICE — ELCTR MONOPOLAR STIMULATOR PROBE FLUSH-TIP

## (undated) DEVICE — VAGINAL PACKING 2 X 6"

## (undated) DEVICE — MIDAS REX LEGEND TAPERED SM BORE 2.3MM X 8CM

## (undated) DEVICE — DRAPE TOWEL BLUE 17" X 24"

## (undated) DEVICE — DRAPE VARI-LENS2 MICROSCPOPE 68MM

## (undated) DEVICE — GLV 7.5 PROTEXIS (WHITE)

## (undated) DEVICE — TAPE SILK 3"

## (undated) DEVICE — GLV 8 PROTEXIS (WHITE)

## (undated) DEVICE — GLV 6.5 PROTEXIS (WHITE)

## (undated) DEVICE — DRAPE LIGHT HANDLE COVER (BLUE)

## (undated) DEVICE — VENODYNE/SCD SLEEVE CALF LARGE

## (undated) DEVICE — CODMAN PERFORATOR 14MM (BLUE)

## (undated) DEVICE — SOL IRR POUR NS 0.9% 500ML

## (undated) DEVICE — BLADE SCALPEL SAFETYLOCK #10

## (undated) DEVICE — DRAPE 3/4 SHEET W REINFORCEMENT 56X77"

## (undated) DEVICE — ELCTR BIPOLAR CORD IRRIGATION AESCULAP DISP

## (undated) DEVICE — DRAIN RESERVOIR FOR JACKSON PRATT 100CC CARDINAL

## (undated) DEVICE — WOUND IRR SURGIPHOR

## (undated) DEVICE — DRAPE CAMERA VIDEO 7"X96"

## (undated) DEVICE — SNAP ON SPHERZ 5 PACK

## (undated) DEVICE — PREP CHLORAPREP HI-LITE ORANGE 26ML

## (undated) DEVICE — SUT SOFSILK 4-0 18" CV-23

## (undated) DEVICE — VISITEC 4X4

## (undated) DEVICE — DRSG TELFA 3 X 8

## (undated) DEVICE — Device

## (undated) DEVICE — ELCTR SUBDERMAL NDL 27G X 1/2" WITH TWISTED PAIR

## (undated) DEVICE — LAP PAD 18 X 18"

## (undated) DEVICE — ELCTR BIPOLAR PROBE

## (undated) DEVICE — STAPLER SKIN VISI-STAT 35 WIDE

## (undated) DEVICE — ELCTR 4-DISC 20MM 49" (RED, BLUE, GREEN, BLACK)

## (undated) DEVICE — ELCTR BOVIE TIP BLADE INSULATED 2.75" EDGE

## (undated) DEVICE — WARMING BLANKET FULL ADULT

## (undated) DEVICE — BLADE SCALPEL SAFETYLOCK #15

## (undated) DEVICE — ELCTR PEDICLE SCREW PROBE 3MM BALL 1.8MM X 100MM

## (undated) DEVICE — ELCTR SUBDERMAL CORKSCREW NDL 1.2MM

## (undated) DEVICE — DRAPE SURGICAL #1010

## (undated) DEVICE — DRAPE MICROSCOPE OPMI VISIONGUARD 48X118"

## (undated) DEVICE — SUT MONOSOF 3-0 18" C-14

## (undated) DEVICE — DRAPE 1/2 SHEET 40X57"

## (undated) DEVICE — GOWN TRIMAX LG

## (undated) DEVICE — POSITIONER FOAM EGG CRATE ULNAR 2PCS (PINK)

## (undated) DEVICE — SUT VICRYL 3-0 18" X-1 (POP-OFF)

## (undated) DEVICE — FOLEY TRAY 16FR 5CC LTX UMETER CLOSED

## (undated) DEVICE — MARKING PEN W RULER

## (undated) DEVICE — CANISTER SUCTION 2000CC

## (undated) DEVICE — SPECIMEN CONTAINER 100ML

## (undated) DEVICE — DRAPE MAYO STAND 30"

## (undated) DEVICE — MEDICATION LABELS W MARKER

## (undated) DEVICE — SYR LUER LOK 20CC

## (undated) DEVICE — DRAIN JACKSON PRATT 7MM FLAT FULL NO TROCAR

## (undated) DEVICE — GLV 7 PROTEXIS (WHITE)

## (undated) DEVICE — DRSG XEROFORM 1 X 8"

## (undated) DEVICE — AESCULAP SCALPFIX 10 CLIPS

## (undated) DEVICE — ELCTR SUBDERMAL NDL CLASSIC 1.5M X 59" (6 COLOR)

## (undated) DEVICE — DRSG CURITY GAUZE SPONGE 4 X 4" 12-PLY

## (undated) DEVICE — SOL IRR POUR H2O 250ML